# Patient Record
Sex: FEMALE | Race: WHITE | Employment: OTHER | URBAN - METROPOLITAN AREA
[De-identification: names, ages, dates, MRNs, and addresses within clinical notes are randomized per-mention and may not be internally consistent; named-entity substitution may affect disease eponyms.]

---

## 2019-06-28 ENCOUNTER — HOSPITAL ENCOUNTER (INPATIENT)
Age: 84
LOS: 11 days | Discharge: NURSING, REHAB OR LONG-TERM CARE | DRG: 330 | End: 2019-07-09
Attending: EMERGENCY MEDICINE | Admitting: INTERNAL MEDICINE
Payer: MEDICARE

## 2019-06-28 ENCOUNTER — APPOINTMENT (OUTPATIENT)
Dept: GENERAL RADIOLOGY | Age: 84
DRG: 330 | End: 2019-06-28
Attending: EMERGENCY MEDICINE
Payer: MEDICARE

## 2019-06-28 DIAGNOSIS — D62 ACUTE BLOOD LOSS ANEMIA: Primary | ICD-10-CM

## 2019-06-28 DIAGNOSIS — Z90.49 S/P RIGHT COLECTOMY: ICD-10-CM

## 2019-06-28 DIAGNOSIS — K92.2 GASTROINTESTINAL HEMORRHAGE, UNSPECIFIED GASTROINTESTINAL HEMORRHAGE TYPE: ICD-10-CM

## 2019-06-28 PROBLEM — D64.9 ANEMIA: Status: ACTIVE | Noted: 2019-06-28

## 2019-06-28 LAB
ALBUMIN SERPL-MCNC: 3.3 G/DL (ref 3.5–5)
ALBUMIN/GLOB SERPL: 0.8 {RATIO} (ref 1.1–2.2)
ALP SERPL-CCNC: 85 U/L (ref 45–117)
ALT SERPL-CCNC: 23 U/L (ref 12–78)
ANION GAP SERPL CALC-SCNC: 6 MMOL/L (ref 5–15)
APPEARANCE UR: CLEAR
AST SERPL-CCNC: 16 U/L (ref 15–37)
BACTERIA URNS QL MICRO: NEGATIVE /HPF
BASOPHILS # BLD: 0 K/UL (ref 0–0.1)
BASOPHILS NFR BLD: 1 % (ref 0–1)
BILIRUB SERPL-MCNC: 0.4 MG/DL (ref 0.2–1)
BILIRUB UR QL: NEGATIVE
BUN SERPL-MCNC: 12 MG/DL (ref 6–20)
BUN/CREAT SERPL: 13 (ref 12–20)
CALCIUM SERPL-MCNC: 9.2 MG/DL (ref 8.5–10.1)
CHLORIDE SERPL-SCNC: 107 MMOL/L (ref 97–108)
CO2 SERPL-SCNC: 27 MMOL/L (ref 21–32)
COLOR UR: ABNORMAL
COMMENT, HOLDF: NORMAL
CREAT SERPL-MCNC: 0.91 MG/DL (ref 0.55–1.02)
DIFFERENTIAL METHOD BLD: ABNORMAL
EOSINOPHIL # BLD: 0.1 K/UL (ref 0–0.4)
EOSINOPHIL NFR BLD: 2 % (ref 0–7)
EPITH CASTS URNS QL MICRO: ABNORMAL /LPF
ERYTHROCYTE [DISTWIDTH] IN BLOOD BY AUTOMATED COUNT: 15.9 % (ref 11.5–14.5)
GLOBULIN SER CALC-MCNC: 3.9 G/DL (ref 2–4)
GLUCOSE SERPL-MCNC: 92 MG/DL (ref 65–100)
GLUCOSE UR STRIP.AUTO-MCNC: NEGATIVE MG/DL
HCT VFR BLD AUTO: 25.9 % (ref 35–47)
HEMOCCULT STL QL: POSITIVE
HGB BLD-MCNC: 7.7 G/DL (ref 11.5–16)
HGB UR QL STRIP: NEGATIVE
HYALINE CASTS URNS QL MICRO: ABNORMAL /LPF (ref 0–5)
IMM GRANULOCYTES # BLD AUTO: 0 K/UL (ref 0–0.04)
IMM GRANULOCYTES NFR BLD AUTO: 0 % (ref 0–0.5)
KETONES UR QL STRIP.AUTO: NEGATIVE MG/DL
LEUKOCYTE ESTERASE UR QL STRIP.AUTO: ABNORMAL
LYMPHOCYTES # BLD: 2.6 K/UL (ref 0.8–3.5)
LYMPHOCYTES NFR BLD: 38 % (ref 12–49)
MCH RBC QN AUTO: 20.3 PG (ref 26–34)
MCHC RBC AUTO-ENTMCNC: 29.7 G/DL (ref 30–36.5)
MCV RBC AUTO: 68.3 FL (ref 80–99)
MONOCYTES # BLD: 0.8 K/UL (ref 0–1)
MONOCYTES NFR BLD: 12 % (ref 5–13)
NEUTS SEG # BLD: 3.3 K/UL (ref 1.8–8)
NEUTS SEG NFR BLD: 48 % (ref 32–75)
NITRITE UR QL STRIP.AUTO: NEGATIVE
NRBC # BLD: 0 K/UL (ref 0–0.01)
NRBC BLD-RTO: 0 PER 100 WBC
PH UR STRIP: 6.5 [PH] (ref 5–8)
PLATELET # BLD AUTO: 352 K/UL (ref 150–400)
PMV BLD AUTO: 9.5 FL (ref 8.9–12.9)
POTASSIUM SERPL-SCNC: 3.6 MMOL/L (ref 3.5–5.1)
PROT SERPL-MCNC: 7.2 G/DL (ref 6.4–8.2)
PROT UR STRIP-MCNC: NEGATIVE MG/DL
RBC # BLD AUTO: 3.79 M/UL (ref 3.8–5.2)
RBC #/AREA URNS HPF: ABNORMAL /HPF (ref 0–5)
SAMPLES BEING HELD,HOLD: NORMAL
SODIUM SERPL-SCNC: 140 MMOL/L (ref 136–145)
SP GR UR REFRACTOMETRY: 1.01 (ref 1–1.03)
TROPONIN I SERPL-MCNC: <0.05 NG/ML
TSH SERPL DL<=0.05 MIU/L-ACNC: 1.41 UIU/ML (ref 0.36–3.74)
UROBILINOGEN UR QL STRIP.AUTO: 0.2 EU/DL (ref 0.2–1)
WBC # BLD AUTO: 6.9 K/UL (ref 3.6–11)
WBC URNS QL MICRO: ABNORMAL /HPF (ref 0–4)

## 2019-06-28 PROCEDURE — 85025 COMPLETE CBC W/AUTO DIFF WBC: CPT

## 2019-06-28 PROCEDURE — 93005 ELECTROCARDIOGRAM TRACING: CPT

## 2019-06-28 PROCEDURE — 65660000000 HC RM CCU STEPDOWN

## 2019-06-28 PROCEDURE — 82272 OCCULT BLD FECES 1-3 TESTS: CPT

## 2019-06-28 PROCEDURE — 84484 ASSAY OF TROPONIN QUANT: CPT

## 2019-06-28 PROCEDURE — 71046 X-RAY EXAM CHEST 2 VIEWS: CPT

## 2019-06-28 PROCEDURE — 84443 ASSAY THYROID STIM HORMONE: CPT

## 2019-06-28 PROCEDURE — 99285 EMERGENCY DEPT VISIT HI MDM: CPT

## 2019-06-28 PROCEDURE — 83540 ASSAY OF IRON: CPT

## 2019-06-28 PROCEDURE — 36415 COLL VENOUS BLD VENIPUNCTURE: CPT

## 2019-06-28 PROCEDURE — 74011250636 HC RX REV CODE- 250/636: Performed by: INTERNAL MEDICINE

## 2019-06-28 PROCEDURE — 81001 URINALYSIS AUTO W/SCOPE: CPT

## 2019-06-28 PROCEDURE — C9113 INJ PANTOPRAZOLE SODIUM, VIA: HCPCS | Performed by: INTERNAL MEDICINE

## 2019-06-28 PROCEDURE — 80053 COMPREHEN METABOLIC PANEL: CPT

## 2019-06-28 RX ORDER — VALSARTAN AND HYDROCHLOROTHIAZIDE 160; 12.5 MG/1; MG/1
1 TABLET, FILM COATED ORAL DAILY
COMMUNITY
End: 2019-10-07 | Stop reason: SDUPTHER

## 2019-06-28 RX ORDER — DOCUSATE SODIUM 100 MG/1
100 CAPSULE, LIQUID FILLED ORAL
Status: DISCONTINUED | OUTPATIENT
Start: 2019-06-28 | End: 2019-07-03

## 2019-06-28 RX ORDER — NEBIVOLOL 5 MG/1
TABLET ORAL DAILY
Status: ON HOLD | COMMUNITY
End: 2019-07-09 | Stop reason: SDUPTHER

## 2019-06-28 RX ORDER — ACETAMINOPHEN 325 MG/1
650 TABLET ORAL
Status: DISCONTINUED | OUTPATIENT
Start: 2019-06-28 | End: 2019-07-03

## 2019-06-28 RX ORDER — VALSARTAN 160 MG/1
160 TABLET ORAL DAILY
COMMUNITY
End: 2019-07-09

## 2019-06-28 RX ORDER — SODIUM CHLORIDE 0.9 % (FLUSH) 0.9 %
5-40 SYRINGE (ML) INJECTION AS NEEDED
Status: DISCONTINUED | OUTPATIENT
Start: 2019-06-28 | End: 2019-07-02 | Stop reason: SDUPTHER

## 2019-06-28 RX ORDER — ONDANSETRON 2 MG/ML
4 INJECTION INTRAMUSCULAR; INTRAVENOUS
Status: DISCONTINUED | OUTPATIENT
Start: 2019-06-28 | End: 2019-07-09 | Stop reason: HOSPADM

## 2019-06-28 RX ORDER — NEBIVOLOL 2.5 MG/1
2.5 TABLET ORAL DAILY
Status: DISCONTINUED | OUTPATIENT
Start: 2019-06-29 | End: 2019-06-30

## 2019-06-28 RX ORDER — SODIUM CHLORIDE 0.9 % (FLUSH) 0.9 %
5-40 SYRINGE (ML) INJECTION EVERY 8 HOURS
Status: DISCONTINUED | OUTPATIENT
Start: 2019-06-28 | End: 2019-07-02 | Stop reason: SDUPTHER

## 2019-06-28 RX ORDER — HYDRALAZINE HYDROCHLORIDE 20 MG/ML
10 INJECTION INTRAMUSCULAR; INTRAVENOUS
Status: DISCONTINUED | OUTPATIENT
Start: 2019-06-28 | End: 2019-07-09 | Stop reason: HOSPADM

## 2019-06-28 RX ADMIN — Medication 10 ML: at 23:43

## 2019-06-28 RX ADMIN — IRON SUCROSE 200 MG: 20 INJECTION, SOLUTION INTRAVENOUS at 23:43

## 2019-06-28 RX ADMIN — PANTOPRAZOLE SODIUM 40 MG: 40 INJECTION, POWDER, FOR SOLUTION INTRAVENOUS at 23:54

## 2019-06-28 RX ADMIN — HYDRALAZINE HYDROCHLORIDE 10 MG: 20 INJECTION INTRAMUSCULAR; INTRAVENOUS at 23:55

## 2019-06-28 NOTE — ED PROVIDER NOTES
EMERGENCY DEPARTMENT HISTORY AND PHYSICAL EXAM      Date: 6/28/2019  Patient Name: Glenard Libman    Please note that this dictation was completed with [employer], the computer voice recognition software. Quite often unanticipated grammatical, syntax, homophones, and other interpretive errors are inadvertently transcribed by the computer software. Please disregard these errors. Please excuse any errors that have escaped final proofreading. History of Presenting Illness     Chief Complaint   Patient presents with    Fatigue     gen. weakness x 4 days       History Provided By: Patient    HPI: Isis Clark, 80 y.o. female, with a past medical history significant for hypertension presenting the emergency department complaining of general fatigue. Patient states \"I have malaise \". She states she has been feeling unwell for the past 3 days. Decreased appetite, fatigue, increased sleepiness. Denies any chest pain, abdominal pain, nausea vomiting or diarrhea. Denies any shortness of breath. Patient denies any other exacerbating, relieving factors or associated symptoms. PCP: None    No current facility-administered medications on file prior to encounter. Current Outpatient Medications on File Prior to Encounter   Medication Sig Dispense Refill    nebivolol (BYSTOLIC) 5 mg tablet daily.  valsartan (DIOVAN) 160 mg tablet Take 160 mg by mouth daily.  valsartan-hydroCHLOROthiazide (DIOVAN-HCT) 160-12.5 mg per tablet Take 1 Tab by mouth every other day. Past History     Past Medical History:  Past Medical History:   Diagnosis Date    Hypertension        Past Surgical History:  Past Surgical History:   Procedure Laterality Date    HX ORTHOPAEDIC      tia. hip replacement       Family History:  History reviewed. No pertinent family history.     Social History:  Social History     Tobacco Use    Smoking status: Never Smoker    Smokeless tobacco: Never Used   Substance Use Topics    Alcohol use: Never     Frequency: Never    Drug use: Never       Allergies: Allergies   Allergen Reactions    Diclofenac Other (comments)     Forgot reaction    Hydrocodone Other (comments)     Forgot reaction    Lisinopril Cough    Oxycodone Other (comments)     Forgot reaction    Promethazine Other (comments)     Pt. Forgot the reaction    Tramadol Other (comments)     Forgot reaction         Review of Systems   Review of Systems   Constitutional: Positive for fatigue. Negative for chills and fever. HENT: Negative for congestion and sore throat. Eyes: Negative for visual disturbance. Respiratory: Negative for cough and shortness of breath. Cardiovascular: Negative for chest pain and leg swelling. Gastrointestinal: Negative for abdominal pain, blood in stool, diarrhea and nausea. Endocrine: Negative for polyuria. Genitourinary: Negative for dysuria, flank pain, vaginal bleeding and vaginal discharge. Musculoskeletal: Negative for myalgias. Skin: Negative for rash. Allergic/Immunologic: Negative for immunocompromised state. Neurological: Positive for weakness. Negative for headaches. Psychiatric/Behavioral: Negative for confusion. Physical Exam   Physical Exam   Constitutional: oriented to person, place, and time. appears well-developed and well-nourished. HENT:   Head: Normocephalic and atraumatic. Moist mucous membranes   Eyes: Pupils are equal, round, and reactive to light. Conjunctivae are pale. Right eye exhibits no discharge. Left eye exhibits no discharge. Neck: Normal range of motion. Neck supple. No tracheal deviation present. Cardiovascular: Normal rate, regular rhythm and normal heart sounds. No murmur heard. Pulmonary/Chest: Effort normal and breath sounds normal. No respiratory distress. no wheezes. no rales. Abdominal: Soft. Bowel sounds are normal. There is no tenderness. There is no rebound and no guarding. : Dark brown, but not malonic stool.   No gross blood  Musculoskeletal: Normal range of motion. exhibits no edema, tenderness or deformity. Neurological: alert and oriented to person, place, and time. Skin: Skin is warm and dry. No rash noted. No erythema. Psychiatric: behavior is normal.   Nursing note and vitals reviewed. Diagnostic Study Results     Labs -   No results found for this or any previous visit (from the past 12 hour(s)). Radiologic Studies -   No orders to display     CT Results  (Last 48 hours)    None        CXR Results  (Last 48 hours)    None            Medical Decision Making   I am the first provider for this patient. I reviewed the vital signs, available nursing notes, past medical history, past surgical history, family history and social history. Vital Signs-Reviewed the patient's vital signs. Patient Vitals for the past 12 hrs:   Temp Pulse Resp BP SpO2   06/28/19 1835 98.5 °F (36.9 °C) (!) 57 18 186/56 97 %       Pulse Oximetry Analysis -100% on room air    Cardiac Monitor:   Normal sinus rhythm    EKG interpretation: (Preliminary)  Sinus rony, rate 58. Non specific anterior ST changes. Records Reviewed: Nursing Notes and Old Medical Records    Provider Notes (Medical Decision Making):   Patient here with general fatigue, malaise. Differential is broad including anemia, electrolyte disturbance, ACS, pneumonia, UTI, hypothyroidism. Disposition pending laboratory work-up and imaging. Patient found to have heme positive stool and anemia. Will consult gastroenterology. ED Course:   Initial assessment performed. The patients presenting problems have been discussed, and they are in agreement with the care plan formulated and outlined with them. I have encouraged them to ask questions as they arise throughout their visit. Discussed with GI on-call Dr. Callum Dickey, he will see and evaluate patient over the weekend. Agreed with iron studies and IV Venofer.     Critical Care Time: none    Disposition:  Patient will be admitted to the hospital by Dr. Erin Nolen or his colleague. Diagnosis     Clinical Impression:   1. Acute blood loss anemia    2. Gastrointestinal hemorrhage, unspecified gastrointestinal hemorrhage type        Attestations:   This note was completed by Maia Caballero DO

## 2019-06-29 LAB
ANION GAP SERPL CALC-SCNC: 6 MMOL/L (ref 5–15)
ATRIAL RATE: 58 BPM
BASOPHILS # BLD: 0.1 K/UL (ref 0–0.1)
BASOPHILS NFR BLD: 1 % (ref 0–1)
BUN SERPL-MCNC: 14 MG/DL (ref 6–20)
BUN/CREAT SERPL: 16 (ref 12–20)
CALCIUM SERPL-MCNC: 9 MG/DL (ref 8.5–10.1)
CALCULATED P AXIS, ECG09: 71 DEGREES
CALCULATED R AXIS, ECG10: 31 DEGREES
CALCULATED T AXIS, ECG11: 51 DEGREES
CHLORIDE SERPL-SCNC: 109 MMOL/L (ref 97–108)
CO2 SERPL-SCNC: 25 MMOL/L (ref 21–32)
CREAT SERPL-MCNC: 0.9 MG/DL (ref 0.55–1.02)
DIAGNOSIS, 93000: NORMAL
DIFFERENTIAL METHOD BLD: ABNORMAL
EOSINOPHIL # BLD: 0.1 K/UL (ref 0–0.4)
EOSINOPHIL NFR BLD: 1 % (ref 0–7)
ERYTHROCYTE [DISTWIDTH] IN BLOOD BY AUTOMATED COUNT: 15.9 % (ref 11.5–14.5)
GLUCOSE SERPL-MCNC: 99 MG/DL (ref 65–100)
HCT VFR BLD AUTO: 23.6 % (ref 35–47)
HGB BLD-MCNC: 7.2 G/DL (ref 11.5–16)
IMM GRANULOCYTES # BLD AUTO: 0 K/UL (ref 0–0.04)
IMM GRANULOCYTES NFR BLD AUTO: 0 % (ref 0–0.5)
IRON SATN MFR SERPL: 3 % (ref 20–50)
IRON SERPL-MCNC: 16 UG/DL (ref 35–150)
LYMPHOCYTES # BLD: 1.7 K/UL (ref 0.8–3.5)
LYMPHOCYTES NFR BLD: 27 % (ref 12–49)
MCH RBC QN AUTO: 20.7 PG (ref 26–34)
MCHC RBC AUTO-ENTMCNC: 30.5 G/DL (ref 30–36.5)
MCV RBC AUTO: 67.8 FL (ref 80–99)
MONOCYTES # BLD: 0.4 K/UL (ref 0–1)
MONOCYTES NFR BLD: 7 % (ref 5–13)
NEUTS SEG # BLD: 4.1 K/UL (ref 1.8–8)
NEUTS SEG NFR BLD: 64 % (ref 32–75)
NRBC # BLD: 0 K/UL (ref 0–0.01)
NRBC BLD-RTO: 0 PER 100 WBC
P-R INTERVAL, ECG05: 174 MS
PLATELET # BLD AUTO: 361 K/UL (ref 150–400)
PMV BLD AUTO: 9.8 FL (ref 8.9–12.9)
POTASSIUM SERPL-SCNC: 3.5 MMOL/L (ref 3.5–5.1)
Q-T INTERVAL, ECG07: 408 MS
QRS DURATION, ECG06: 76 MS
QTC CALCULATION (BEZET), ECG08: 400 MS
RBC # BLD AUTO: 3.48 M/UL (ref 3.8–5.2)
RBC MORPH BLD: ABNORMAL
SODIUM SERPL-SCNC: 140 MMOL/L (ref 136–145)
TIBC SERPL-MCNC: 495 UG/DL (ref 250–450)
VENTRICULAR RATE, ECG03: 58 BPM
WBC # BLD AUTO: 6.4 K/UL (ref 3.6–11)

## 2019-06-29 PROCEDURE — C9113 INJ PANTOPRAZOLE SODIUM, VIA: HCPCS | Performed by: INTERNAL MEDICINE

## 2019-06-29 PROCEDURE — 94760 N-INVAS EAR/PLS OXIMETRY 1: CPT

## 2019-06-29 PROCEDURE — 74011000250 HC RX REV CODE- 250: Performed by: INTERNAL MEDICINE

## 2019-06-29 PROCEDURE — 74011250636 HC RX REV CODE- 250/636: Performed by: INTERNAL MEDICINE

## 2019-06-29 PROCEDURE — 85025 COMPLETE CBC W/AUTO DIFF WBC: CPT

## 2019-06-29 PROCEDURE — 65660000000 HC RM CCU STEPDOWN

## 2019-06-29 PROCEDURE — 36415 COLL VENOUS BLD VENIPUNCTURE: CPT

## 2019-06-29 PROCEDURE — 80048 BASIC METABOLIC PNL TOTAL CA: CPT

## 2019-06-29 RX ORDER — POLYETHYLENE GLYCOL 3350, SODIUM SULFATE ANHYDROUS, SODIUM BICARBONATE, SODIUM CHLORIDE, POTASSIUM CHLORIDE 236; 22.74; 6.74; 5.86; 2.97 G/4L; G/4L; G/4L; G/4L; G/4L
4000 POWDER, FOR SOLUTION ORAL ONCE
Status: COMPLETED | OUTPATIENT
Start: 2019-06-30 | End: 2019-06-30

## 2019-06-29 RX ADMIN — Medication 10 ML: at 14:00

## 2019-06-29 RX ADMIN — Medication 10 ML: at 21:00

## 2019-06-29 RX ADMIN — SODIUM CHLORIDE 40 MG: 9 INJECTION, SOLUTION INTRAMUSCULAR; INTRAVENOUS; SUBCUTANEOUS at 20:52

## 2019-06-29 RX ADMIN — SODIUM CHLORIDE 40 MG: 9 INJECTION, SOLUTION INTRAMUSCULAR; INTRAVENOUS; SUBCUTANEOUS at 13:10

## 2019-06-29 RX ADMIN — Medication 10 ML: at 06:55

## 2019-06-29 NOTE — PROGRESS NOTES
Bedside shift change report given to Capri Guerrier RN (oncoming nurse) by Jaimee Munson RN   (offgoing nurse). Report included the following information SBAR, Kardex, Intake/Output, MAR and Recent Results.

## 2019-06-29 NOTE — PROGRESS NOTES
TRANSFER - IN REPORT:    Verbal report received from [address] (name) on [address]  being received from ED (unit) for routine progression of care      Report consisted of patients Situation, Background, Assessment and   Recommendations(SBAR). Information from the following report(s) SBAR, Kardex, Procedure Summary, Intake/Output, MAR and Recent Results was reviewed with the receiving nurse. Opportunity for questions and clarification was provided. Assessment completed upon patients arrival to unit and care assumed.

## 2019-06-29 NOTE — PROGRESS NOTES
Bedside shift change report given to [facility] (oncoming nurse) by Maximo Lynn (offgoing nurse). Report included the following information SBAR, Kardex, Procedure Summary, Intake/Output, MAR and Recent Results.

## 2019-06-29 NOTE — H&P
Hospitalist Admission Note    NAME: Isis White   :  1932   MRN:  [id number]     Date/Time:  2019 10:57 PM    Patient PCP: None  ________________________________________________________________________    My assessment of this patient's clinical condition and my plan of care is as follows. Assessment / Plan:  Acute blood loss anemia symptomatic  Suspected GI bleed  -On rectal exam she had dark brown stool and fecal occult blood was positive  -She did have episode of low blood pressure at home but currently blood pressure is improved  -Hemoglobin is currently 7.7 and baseline is unknown  -Start Protonix 40 mg IV twice daily, keep n.p.o. from midnight  -Holding off on IV fluids for now as blood pressure is on the higher side  -Insert 2 large-bore IV lines at all times  -GI has been consulted and the recommended benefit and keeping the patient n.p.o. from midnight  -Iron profile has been ordered.  Check CBC in a.m.     Hypertensive urgency  -Patient reports her blood pressure was on the lower side at home and currently blood pressure is 180 x 70  -Resume her home valsartan and hydrochlorothiazide  -Start hydralazine as needed for hypertensive urgency    Sinus bradycardia likely related to nebivolol  -Continue nebivolol with holding parameters  -TSH is normal        Code Status: full  Surrogate Decision Maker: Dtr [id number]    DVT Prophylaxis: Scd's  GI Prophylaxis: not indicated    Baseline: From home independent        Subjective:   CHIEF COMPLAINT: Gen weakness    HISTORY OF PRESENT ILLNESS:     This is a 26-year-old female with past medical history of hypertension is coming to the hospital with chief complaints of generalized weakness, fatigue since the last week.  Patient reports being in her usual state of health until about a week ago when she started not feeling well.  She reports the symptoms really got worse in the last 4 days.  She reports weakness is generalized involving both the arms and legs.  She does not report any abdominal pain, diarrhea, constipation, nausea or vomiting.  She does report mild exertional shortness of breath but no chest pain or palpitations.  Does not report any dysuria or urgency. On arrival to the hospital she was noted to have slight bradycardia, blood pressure was stable.  On lab work she was noted to have a hemoglobin of 7.7 BMP was within normal limits.  Facet of troponin was 0.05.  She had an x-ray which showed no evidence of acute process.  EKG showed sinus bradycardia.  He had a rectal exam in the emergency department which showed evidence of positive occult blood test.    We were asked to admit for work up and evaluation of the above problems. Past Medical History:   Diagnosis Date    Hypertension         Past Surgical History:   Procedure Laterality Date    HX ORTHOPAEDIC      tia. hip replacement       Social History     Tobacco Use    Smoking status: Never Smoker    Smokeless tobacco: Never Used   Substance Use Topics    Alcohol use: Never     Frequency: Never        Family History  No cad in the family    Allergies   Allergen Reactions    Diclofenac Other (comments)     Forgot reaction    Hydrocodone Other (comments)     Forgot reaction    Lisinopril Cough    Oxycodone Other (comments)     Forgot reaction    Promethazine Other (comments)     Pt. Forgot the reaction    Tramadol Other (comments)     Forgot reaction        Prior to Admission medications    Medication Sig Start Date End Date Taking? Authorizing Provider   nebivolol (BYSTOLIC) 5 mg tablet daily. Yes Fabio, MD Henrique   valsartan (DIOVAN) 160 mg tablet Take 160 mg by mouth daily. Yes Fabio, MD Henrique   valsartan-hydroCHLOROthiazide (DIOVAN-HCT) 160-12.5 mg per tablet Take 1 Tab by mouth every other day. Yes Fabio, MD Henrique       REVIEW OF SYSTEMS:     I am not able to complete the review of systems because:    The patient is intubated and sedated    The patient has altered mental status due to his acute medical problems    The patient has baseline aphasia from prior stroke(s)    The patient has baseline dementia and is not reliable historian    The patient is in acute medical distress and unable to provide information           Total of 12 systems reviewed as follows:       POSITIVE= underlined text  Negative = text not underlined  General:  fever, chills, sweats, generalized weakness, weight loss/gain,      loss of appetite   Eyes:    blurred vision, eye pain, loss of vision, double vision  ENT:    rhinorrhea, pharyngitis   Respiratory:   cough, sputum production, SOB, MCPHERSON, wheezing, pleuritic pain   Cardiology:   chest pain, palpitations, orthopnea, PND, edema, syncope   Gastrointestinal:  abdominal pain , N/V, diarrhea, dysphagia, constipation, bleeding   Genitourinary:  frequency, urgency, dysuria, hematuria, incontinence   Muskuloskeletal :  arthralgia, myalgia, back pain  Hematology:  easy bruising, nose or gum bleeding, lymphadenopathy   Dermatological: rash, ulceration, pruritis, color change / jaundice  Endocrine:   hot flashes or polydipsia   Neurological:  headache, dizziness, confusion, focal weakness, paresthesia,     Speech difficulties, memory loss, gait difficulty  Psychological: Feelings of anxiety, depression, agitation    Objective:   VITALS:    Visit Vitals  /52   Pulse (!) 56   Temp 98.5 °F (36.9 °C)   Resp 18   Ht 5' 4\" (1.626 m)   Wt 74.8 kg (165 lb)   SpO2 100%   BMI 28.32 kg/m²       PHYSICAL EXAM:    General:    Alert, cooperative, no distress, appears stated age. HEENT: Atraumatic, anicteric sclerae, pink conjunctivae     No oral ulcers, mucosa moist, throat clear, dentition fair  Neck:  Supple, symmetrical,  thyroid: non tender  Lungs:   Clear to auscultation bilaterally. No Wheezing or Rhonchi. No rales. Chest wall:  No tenderness  No Accessory muscle use. Heart:   Regular  rhythm,  No  murmur   No edema  Abdomen:   Soft, non-tender.  Not distended. Bowel sounds normal  Extremities: No cyanosis. No clubbing,      Skin turgor normal, Capillary refill normal, Radial dial pulse 2+  Skin:     Not pale. Not Jaundiced  No rashes   Psych:  Good insight. Not depressed. Not anxious or agitated. Neurologic: EOMs intact. No facial asymmetry. No aphasia or slurred speech. Symmetrical strength, Sensation grossly intact. Alert and oriented X 4.     _______________________________________________________________________  Care Plan discussed with:    Comments   Patient y    Family      RN y    Care Manager                    Consultant:      _______________________________________________________________________  Expected  Disposition:   Home with Family y   HH/PT/OT/RN    SNF/LTC    [facility]    ________________________________________________________________________  TOTAL TIME:  61  Minutes    Critical Care Provided     Minutes non procedure based      Comments    y Reviewed previous records   >50% of visit spent in counseling and coordination of care y Discussion with patient and/or family and questions answered       ________________________________________________________________________  Signed: Rico Tucker MD    Procedures: see electronic medical records for all procedures/Xrays and details which were not copied into this note but were reviewed prior to creation of Plan. LAB DATA REVIEWED:    Recent Results (from the past 24 hour(s))   SAMPLES BEING HELD    Collection Time: 06/28/19  6:39 PM   Result Value Ref Range    SAMPLES BEING HELD 1GREEN TOP     COMMENT        Add-on orders for these samples will be processed based on acceptable specimen integrity and analyte stability, which may vary by analyte.    CBC WITH AUTOMATED DIFF    Collection Time: 06/28/19  7:16 PM   Result Value Ref Range    WBC 6.9 3.6 - 11.0 K/uL    RBC 3.79 (L) 3.80 - 5.20 M/uL    HGB 7.7 (L) 11.5 - 16.0 g/dL    HCT 25.9 (L) 35.0 - 47.0 %    MCV 68.3 (L) 80.0 - 99.0 FL    MCH 20.3 (L) 26.0 - 34.0 PG    MCHC 29.7 (L) 30.0 - 36.5 g/dL    RDW 15.9 (H) 11.5 - 14.5 %    PLATELET 536 291 - 418 K/uL    MPV 9.5 8.9 - 12.9 FL    NRBC 0.0 0  WBC    ABSOLUTE NRBC 0.00 0.00 - 0.01 K/uL    NEUTROPHILS 48 32 - 75 %    LYMPHOCYTES 38 12 - 49 %    MONOCYTES 12 5 - 13 %    EOSINOPHILS 2 0 - 7 %    BASOPHILS 1 0 - 1 %    IMMATURE GRANULOCYTES 0 0.0 - 0.5 %    ABS. NEUTROPHILS 3.3 1.8 - 8.0 K/UL    ABS. LYMPHOCYTES 2.6 0.8 - 3.5 K/UL    ABS. MONOCYTES 0.8 0.0 - 1.0 K/UL    ABS. EOSINOPHILS 0.1 0.0 - 0.4 K/UL    ABS. BASOPHILS 0.0 0.0 - 0.1 K/UL    ABS. IMM. GRANS. 0.0 0.00 - 0.04 K/UL    DF AUTOMATED     METABOLIC PANEL, COMPREHENSIVE    Collection Time: 06/28/19  7:16 PM   Result Value Ref Range    Sodium 140 136 - 145 mmol/L    Potassium 3.6 3.5 - 5.1 mmol/L    Chloride 107 97 - 108 mmol/L    CO2 27 21 - 32 mmol/L    Anion gap 6 5 - 15 mmol/L    Glucose 92 65 - 100 mg/dL    BUN 12 6 - 20 MG/DL    Creatinine 0.91 0.55 - 1.02 MG/DL    BUN/Creatinine ratio 13 12 - 20      GFR est AA >60 >60 ml/min/1.73m2    GFR est non-AA 58 (L) >60 ml/min/1.73m2    Calcium 9.2 8.5 - 10.1 MG/DL    Bilirubin, total 0.4 0.2 - 1.0 MG/DL    ALT (SGPT) 23 12 - 78 U/L    AST (SGOT) 16 15 - 37 U/L    Alk.  phosphatase 85 45 - 117 U/L    Protein, total 7.2 6.4 - 8.2 g/dL    Albumin 3.3 (L) 3.5 - 5.0 g/dL    Globulin 3.9 2.0 - 4.0 g/dL    A-G Ratio 0.8 (L) 1.1 - 2.2     TROPONIN I    Collection Time: 06/28/19  7:16 PM   Result Value Ref Range    Troponin-I, Qt. <0.05 <0.05 ng/mL   TSH 3RD GENERATION    Collection Time: 06/28/19  7:16 PM   Result Value Ref Range    TSH 1.41 0.36 - 3.74 uIU/mL   URINALYSIS W/ RFLX MICROSCOPIC    Collection Time: 06/28/19  7:35 PM   Result Value Ref Range    Color YELLOW/STRAW      Appearance CLEAR CLEAR      Specific gravity 1.010 1.003 - 1.030      pH (UA) 6.5 5.0 - 8.0      Protein NEGATIVE  NEG mg/dL    Glucose NEGATIVE  NEG mg/dL    Ketone NEGATIVE  NEG mg/dL    Bilirubin NEGATIVE  NEG Blood NEGATIVE  NEG      Urobilinogen 0.2 0.2 - 1.0 EU/dL    Nitrites NEGATIVE  NEG      Leukocyte Esterase MODERATE (A) NEG      WBC 10-20 0 - 4 /hpf    RBC 0-5 0 - 5 /hpf    Epithelial cells FEW FEW /lpf    Bacteria NEGATIVE  NEG /hpf    Hyaline cast 0-2 0 - 5 /lpf   EKG, 12 LEAD, INITIAL    Collection Time: 06/28/19  7:46 PM   Result Value Ref Range    Ventricular Rate 58 BPM    Atrial Rate 58 BPM    P-R Interval 174 ms    QRS Duration 76 ms    Q-T Interval 408 ms    QTC Calculation (Bezet) 400 ms    Calculated P Axis 71 degrees    Calculated R Axis 31 degrees    Calculated T Axis 51 degrees    Diagnosis       Sinus bradycardia  Low voltage QRS  Nonspecific ST abnormality  No previous ECGs available     OCCULT BLOOD, STOOL    Collection Time: 06/28/19  9:15 PM   Result Value Ref Range    Occult blood, stool POSITIVE (A) NEG

## 2019-06-29 NOTE — PROGRESS NOTES
Hospitalist Progress Note    NAME: Isis Butler   :  1932   MRN:  [id number]       Assessment / Plan:  Acute Blood Loss Anemia, Hgb 7.7 on admission, 7.2 today; Patient denies history of anemia  +FOBT and dark stool at home  Iron deficiency  -Follow Hgb and Hct, send type and cross with am labs  -Family to bring in lab results from patient's last yearly physical to compare Hgb trends  -Await GI consult, patient has never had a colonoscopy  -get orthostatics and walking HR since Hgb borderline and patient complains of MCPHERSON  -received IV ion this am  -continue IV PPI  -I have ordered diet for patient  -risks and benefits of bleed transfusion discussed with patient today     Hypertension with Hypertensive Urgency on presentation   Sinus Bradycardia  -Continue Bystolic for now, patient lightheaded even with low normal BP on standing thus will be carotid dopplers     25.0 - 29.9 Overweight / Body mass index is 28.32 kg/m².     Code status: DNR  Prophylaxis: SCD's  Recommended Disposition: Home w/Family     Subjective:     Chief Complaint / Reason for Physician Visit: follow-up weakness, anemia  Patient seen for follow-up  Has never been told she is anemia  Describes dark stool at home    Review of Systems:  Symptom Y/N Comments  Symptom Y/N Comments   Fever/Chills    Chest Pain     Poor Appetite    Edema     Cough    Abdominal Pain     Sputum    Joint Pain     SOB/MCPHERSON    Pruritis/Rash     Nausea/vomit    Tolerating PT/OT     Diarrhea    Tolerating Diet     Constipation    Other       Could NOT obtain due to:      Objective:     VITALS:   Last 24hrs VS reviewed since prior progress note.  Most recent are:  Patient Vitals for the past 24 hrs:   Temp Pulse Resp BP SpO2   19 0732 97.8 °F (36.6 °C) 74 16 140/64 99 %   19 0419 97.4 °F (36.3 °C) 63 18 149/49 100 %   19 2338 97.9 °F (36.6 °C) 61 16 190/59 100 %   19 2308  65 18 178/62 96 %   19 2145  (!) 56 18 152/52 100 %   19 2030  60 18 171/50 96 %   06/28/19 1930  (!) 54 18 158/58 98 %   06/28/19 1835 98.5 °F (36.9 °C) (!) 57 18 186/56 97 %     No intake or output data in the 24 hours ending 06/29/19 0944     PHYSICAL EXAM:  General: WD, WN. Alert, cooperative, no acute distress    EENT:  EOMI. Anicteric sclerae. MM   Resp:  CTA bilaterally, no wheezing or rales. No accessory muscle use  CV:  Regular  rhythm,  No edema  GI:  Soft, Non distended, Non tender.  +Bowel sounds  Neurologic:  Alert and oriented X 3, normal speech,   Psych:   Good insight. Not anxious nor agitated  Skin:  No rashes, pale. No jaundice    Reviewed most current lab test results and cultures  YES  Reviewed most current radiology test results   YES  Review and summation of old records today    NO  Reviewed patient's current orders and MAR    YES  PMH/ reviewed - no change compared to H&P  ________________________________________________________________________  Care Plan discussed with:    Comments   Patient x    Family  x    RN x    Care Manager     Consultant                        Multidiciplinary team rounds were held today with , nursing, pharmacist and clinical coordinator. Patient's plan of care was discussed; medications were reviewed and discharge planning was addressed. ________________________________________________________________________  Total NON critical care TIME: 35   Minutes    Total CRITICAL CARE TIME Spent:   Minutes non procedure based      Comments   >50% of visit spent in counseling and coordination of care x    ________________________________________________________________________  Brenda Santizo MD     Procedures: see electronic medical records for all procedures/Xrays and details which were not copied into this note but were reviewed prior to creation of Plan. LABS:  I reviewed today's most current labs and imaging studies.   Pertinent labs include:  Recent Labs     06/29/19  0420 06/28/19  1916   WBC 6.4 6.9 HGB 7.2* 7.7*   HCT 23.6* 25.9*    352     Recent Labs     06/29/19  0420 06/28/19  1916    140   K 3.5 3.6   * 107   CO2 25 27   GLU 99 92   BUN 14 12   CREA 0.90 0.91   CA 9.0 9.2   ALB  --  3.3*   TBILI  --  0.4   SGOT  --  16   ALT  --  23       Signed: Gelacio Carlos MD

## 2019-06-29 NOTE — PROGRESS NOTES
Primary Nurse Irlanda Ng RN and Praveen Almonte RN performed a dual skin assessment on this patient No impairment noted  Jt score is 22    *Bilateral hip scars from replacements. ABD scar from appendectomy.

## 2019-06-29 NOTE — PROGRESS NOTES
Pt states she has feeling of syncope, states she has history of fainting in the past but last being 2 years ago. Feeling came during laying and standing while obtaining orthostatics. MD notified.

## 2019-06-30 LAB
ANION GAP SERPL CALC-SCNC: 6 MMOL/L (ref 5–15)
BASOPHILS # BLD: 0 K/UL (ref 0–0.1)
BASOPHILS NFR BLD: 0 % (ref 0–1)
BUN SERPL-MCNC: 12 MG/DL (ref 6–20)
BUN/CREAT SERPL: 12 (ref 12–20)
CALCIUM SERPL-MCNC: 8.7 MG/DL (ref 8.5–10.1)
CHLORIDE SERPL-SCNC: 110 MMOL/L (ref 97–108)
CO2 SERPL-SCNC: 25 MMOL/L (ref 21–32)
CREAT SERPL-MCNC: 0.99 MG/DL (ref 0.55–1.02)
DIFFERENTIAL METHOD BLD: ABNORMAL
EOSINOPHIL # BLD: 0.1 K/UL (ref 0–0.4)
EOSINOPHIL NFR BLD: 2 % (ref 0–7)
ERYTHROCYTE [DISTWIDTH] IN BLOOD BY AUTOMATED COUNT: 16.2 % (ref 11.5–14.5)
GLUCOSE SERPL-MCNC: 94 MG/DL (ref 65–100)
HCT VFR BLD AUTO: 23.8 % (ref 35–47)
HGB BLD-MCNC: 7 G/DL (ref 11.5–16)
IMM GRANULOCYTES # BLD AUTO: 0 K/UL (ref 0–0.04)
IMM GRANULOCYTES NFR BLD AUTO: 0 % (ref 0–0.5)
LYMPHOCYTES # BLD: 2.7 K/UL (ref 0.8–3.5)
LYMPHOCYTES NFR BLD: 39 % (ref 12–49)
MCH RBC QN AUTO: 20 PG (ref 26–34)
MCHC RBC AUTO-ENTMCNC: 29.4 G/DL (ref 30–36.5)
MCV RBC AUTO: 68 FL (ref 80–99)
MONOCYTES # BLD: 0.9 K/UL (ref 0–1)
MONOCYTES NFR BLD: 12 % (ref 5–13)
NEUTS SEG # BLD: 3.2 K/UL (ref 1.8–8)
NEUTS SEG NFR BLD: 46 % (ref 32–75)
NRBC # BLD: 0.02 K/UL (ref 0–0.01)
NRBC BLD-RTO: 0.3 PER 100 WBC
PLATELET # BLD AUTO: 338 K/UL (ref 150–400)
PMV BLD AUTO: 10.2 FL (ref 8.9–12.9)
POTASSIUM SERPL-SCNC: 3.6 MMOL/L (ref 3.5–5.1)
RBC # BLD AUTO: 3.5 M/UL (ref 3.8–5.2)
SODIUM SERPL-SCNC: 141 MMOL/L (ref 136–145)
WBC # BLD AUTO: 6.9 K/UL (ref 3.6–11)

## 2019-06-30 PROCEDURE — 94760 N-INVAS EAR/PLS OXIMETRY 1: CPT

## 2019-06-30 PROCEDURE — 65660000000 HC RM CCU STEPDOWN

## 2019-06-30 PROCEDURE — C9113 INJ PANTOPRAZOLE SODIUM, VIA: HCPCS | Performed by: INTERNAL MEDICINE

## 2019-06-30 PROCEDURE — 74011000250 HC RX REV CODE- 250: Performed by: SPECIALIST

## 2019-06-30 PROCEDURE — 85025 COMPLETE CBC W/AUTO DIFF WBC: CPT

## 2019-06-30 PROCEDURE — 86923 COMPATIBILITY TEST ELECTRIC: CPT

## 2019-06-30 PROCEDURE — 74011000250 HC RX REV CODE- 250: Performed by: INTERNAL MEDICINE

## 2019-06-30 PROCEDURE — 86900 BLOOD TYPING SEROLOGIC ABO: CPT

## 2019-06-30 PROCEDURE — 36430 TRANSFUSION BLD/BLD COMPNT: CPT

## 2019-06-30 PROCEDURE — 36415 COLL VENOUS BLD VENIPUNCTURE: CPT

## 2019-06-30 PROCEDURE — 80048 BASIC METABOLIC PNL TOTAL CA: CPT

## 2019-06-30 PROCEDURE — P9016 RBC LEUKOCYTES REDUCED: HCPCS

## 2019-06-30 PROCEDURE — 74011250636 HC RX REV CODE- 250/636: Performed by: INTERNAL MEDICINE

## 2019-06-30 PROCEDURE — 30233N1 TRANSFUSION OF NONAUTOLOGOUS RED BLOOD CELLS INTO PERIPHERAL VEIN, PERCUTANEOUS APPROACH: ICD-10-PCS | Performed by: INTERNAL MEDICINE

## 2019-06-30 RX ORDER — SODIUM CHLORIDE 9 MG/ML
250 INJECTION, SOLUTION INTRAVENOUS AS NEEDED
Status: DISCONTINUED | OUTPATIENT
Start: 2019-06-30 | End: 2019-07-03

## 2019-06-30 RX ADMIN — POLYETHYLENE GLYCOL-3350 AND ELECTROLYTES 4000 ML: 236; 6.74; 5.86; 2.97; 22.74 POWDER, FOR SOLUTION ORAL at 15:06

## 2019-06-30 RX ADMIN — Medication 10 ML: at 10:02

## 2019-06-30 RX ADMIN — SODIUM CHLORIDE 40 MG: 9 INJECTION, SOLUTION INTRAMUSCULAR; INTRAVENOUS; SUBCUTANEOUS at 10:01

## 2019-06-30 RX ADMIN — Medication 10 ML: at 22:45

## 2019-06-30 RX ADMIN — SODIUM CHLORIDE 40 MG: 9 INJECTION, SOLUTION INTRAMUSCULAR; INTRAVENOUS; SUBCUTANEOUS at 22:44

## 2019-06-30 NOTE — PROGRESS NOTES
Bedside and Verbal shift change report given to Kusum Bailon RN (oncoming nurse) by PETROS Gale (offgoing nurse). Report included the following information SBAR, Kardex, MAR, Recent Results and Cardiac Rhythm NSR. Bedside and Verbal shift change report given to PETROS Gale (oncoming nurse) by Kusum Bailon RN (offgoing nurse). Report included the following information SBAR, Kardex, MAR, Recent Results and Cardiac Rhythm NSR.

## 2019-06-30 NOTE — PROGRESS NOTES
No nausea. Voided twice. Slept well. Plan for clear liq and go lyutely today. 3 attempts for a 20 ga IV site unsuccessful. Left antecubital site patent, she doesn't like the location, will leave in till new site available. Hgb 7.2 to 7.0 this am. Type and screen sent. BP up to 150's over 50's. No feeling of faintness tonight.

## 2019-06-30 NOTE — CONSULTS
[facility]    Name:  Tiffany Murrieta  MR#:  [id number]  :  1932  ACCOUNT #:  [de-identified]  DATE OF SERVICE:  2019      CHIEF COMPLAINT:  Generalized weakness, severe iron-deficiency anemia. HISTORY OF PRESENT ILLNESS:  An 29-year-old woman who is taking no NSAIDs other than aspirin 81 mg a day and over the last week has been noting increased fatigue and weakness. She denied any chest pain. When brought to the emergency room, she was found to have a hemoglobin of 7.7 g, which was microcytic, and iron panel was found to represent iron deficiency. The patient reports being in usual state of health until a week ago when she started feeling poorly. She noted that the symptoms worsened four days prior to admission. She denied any abdominal pain, diarrhea, constipation, nausea, or vomiting. She denied any melena or hematochezia. She did note some dyspnea on exertion. There are no palpitations. In the emergency room, she was found to be tachycardic with hemoglobin 7.7, normal BMP, normal troponin. Chest x-ray was normal.  EKG showed a bradycardia. Rectal exam demonstrated brown stool that was heme positive. She emphatically denies any other NSAID use other than simply aspirin. The patient never had an EGD or colonoscopy. No history of GI bleeding in the past.  Only surgery has been that of appendectomy many years ago. PAST MEDICAL HISTORY:  Positive only for hypertension. MEDICATIONS PRIOR TO ADMISSION:  Include Bystolic, Diovan, hydrochlorothiazide, and 81 mg of aspirin. ALLERGIES:  DICLOFENAC, HYDROCODONE, LISINOPRIL, OXYCODONE, PROMETHAZINE, TRAMADOL. SURGICAL HISTORY:  Bilateral hip replacement. SOCIAL HISTORY:  She is a nonsmoker, nondrinker. FAMILY HISTORY:  There is no family history of coronary artery disease. No history of colon cancer, colon polyps, or pancreatic cancer.     REVIEW OF SYSTEMS:  Complete 10-system review is as noted above. Otherwise, it is negative. PHYSICAL EXAMINATION:  GENERAL:  Alert, oriented elderly woman in no acute distress. Oriented x3. Pale in appearance. VITAL SIGNS:  Temperature is 98.1, pulse is 70, blood pressure is 139/60, respirations are 18. SKIN:  Without rashes or petechiae. HEAD, EYES, EARS, NOSE, AND THROAT:  Sclerae are anicteric. Conjunctivae are pale. Moist mucous membranes. NECK:  Supple. LYMPHATIC:  There is no adenopathy in neck or groin. CHEST:  Clear to auscultation and percussion. HEART:  Reveals regular rate and rhythm. ABDOMEN:  Soft without hepatosplenomegaly. Nontender. EXTREMITIES:  Without cyanosis, clubbing, or edema. LABORATORY DATA:  WBC is 6.7, hemoglobin is 7.2, MCV is 67, platelet count is 977. Urinalysis:  Moderate leukocyte esterase, wbc is 10-20. BUN/creatinine ratio is normal.  Albumin is 3.3. Iron percent saturation is 3. TSH is normal.  Stools are occult positive. Chest x-ray:  No acute changes. EKG:  Sinus rony, low voltage QRS, nonspecific ST-T wave abnormalities. IMPRESSION AND PLAN:  1. Gastrointestinal blood loss anemia suspicious for lower gastrointestinal source, in particular neoplasia. Certainly, agree with use of Protonix as with her aspirin usage, even low dose could have an upper gastrointestinal source. The lack of elevated BUN/creatinine ratio makes me feel this is lower. Transfuse as you feel indicated. 2.  Hypertension. 3.  Sinus bradycardia, being followed. Recommend esophagogastroduodenoscopy and colonoscopy for further evaluation. We will start her on clear liquids tomorrow. Transfuse as you feel indicated.       MD LUCIUS Low/[id number]/  D:  06/29/2019 15:48  T:  06/29/2019 19:38  JOB #:  [id number]  CC:    Sara Salas MD

## 2019-06-30 NOTE — PROGRESS NOTES
Hospitalist Progress Note    NAME: Isis Marte   :  1932   MRN:  [id number]       Assessment / Plan:  Acute Blood Loss Anemia, baseline Hgb in  10-11 range per review of outside records, Patient's Hgb today is 7.0, patient with symptoms of anemia and case discussed with Dr. Dedra Brown today and endoscopy will be safer if unit of blood given  +FOBT and dark stool at home  Iron deficiency  -will transfuse 1 unit pRBCs today  -Endoscopy planned for tomorrow per GI  -continue IV PPI     Hypertension with Hypertensive Urgency on presentation   Sinus Bradycardia: resolved  -Holding Bystolic for now (held yesterday with BP being lower limit of normal)    -Patient clarifies today that on standing she has weakess and feels faint and denies that this is lightheadedness or dizziness thus carotid dopplers discontinued     25.0 - 29.9 Overweight / Body mass index is 28.32 kg/m².     Code status: DNR  Prophylaxis: SCD's  Recommended Disposition: Home w/Family     Subjective:     Chief Complaint / Reason for Physician Visit: follow-up weakness, anemia  Patient seen for follow-up  Still faint with standing, going to restroom  Has a feeling of nausea    Review of Systems:  Symptom Y/N Comments  Symptom Y/N Comments   Fever/Chills    Chest Pain n    Poor Appetite    Edema     Cough    Abdominal Pain n    Sputum    Joint Pain     SOB/MCPHERSON y MCPHERSON  Pruritis/Rash     Nausea/vomit  No vomiting  Tolerating PT/OT     Diarrhea    Tolerating Diet     Constipation    Other       Could NOT obtain due to:      Objective:     VITALS:   Last 24hrs VS reviewed since prior progress note.  Most recent are:  Patient Vitals for the past 24 hrs:   Temp Pulse Resp BP SpO2   19 0758 97.9 °F (36.6 °C) 64 19 153/61 96 %   19 0300 97.3 °F (36.3 °C) 66 20 151/59 98 %   19 2346 97.4 °F (36.3 °C) 71 18 (!) 147/39 97 %   19 1953 97.4 °F (36.3 °C) 66 18 141/42 96 %   19 1504 98.1 °F (36.7 °C) 70 18 139/60 98 %   19 1308  70  118/40    06/29/19 1307  74  104/50    06/29/19 1306  63  134/51    06/29/19 1146 97.3 °F (36.3 °C) 66 18 142/51 96 %       Intake/Output Summary (Last 24 hours) at 6/30/2019 0940  Last data filed at 6/30/2019 0624  Gross per 24 hour   Intake 1350 ml   Output 600 ml   Net 750 ml        PHYSICAL EXAM:  General: WD, WN. Alert, cooperative, no acute distress    EENT:  EOMI. Anicteric sclerae. MMM; no carotid bruit  Resp:  CTA bilaterally, no wheezing or rales. No accessory muscle use  CV:  Regular  rhythm,  No edema  GI:  Soft, Non distended, Non tender.  +Bowel sounds  Neurologic:  Alert and oriented X 3, normal speech,   Psych:   Good insight. Not anxious nor agitated  Skin:  No rashes, still Pale. No jaundice    Reviewed most current lab test results and cultures  YES  Reviewed most current radiology test results   YES  Review and summation of old records today    NO  Reviewed patient's current orders and MAR    YES  PMH/SH reviewed - no change compared to H&P  ________________________________________________________________________  Care Plan discussed with:    Comments   Patient x    Family      RN x    Care Manager     Consultant  x GI                     Multidiciplinary team rounds were held today with , nursing, pharmacist and clinical coordinator. Patient's plan of care was discussed; medications were reviewed and discharge planning was addressed. ________________________________________________________________________  Total NON critical care TIME: 30   Minutes    Total CRITICAL CARE TIME Spent:   Minutes non procedure based      Comments   >50% of visit spent in counseling and coordination of care x    ________________________________________________________________________  Víctor Walden MD     Procedures: see electronic medical records for all procedures/Xrays and details which were not copied into this note but were reviewed prior to creation of Plan. LABS:  I reviewed today's most current labs and imaging studies.   Pertinent labs include:  Recent Labs     06/30/19 0341 06/29/19 0420 06/28/19 1916   WBC 6.9 6.4 6.9   HGB 7.0* 7.2* 7.7*   HCT 23.8* 23.6* 25.9*    361 352     Recent Labs     06/30/19 0341 06/29/19 0420 06/28/19 1916    140 140   K 3.6 3.5 3.6   * 109* 107   CO2 25 25 27   GLU 94 99 92   BUN 12 14 12   CREA 0.99 0.90 0.91   CA 8.7 9.0 9.2   ALB  --   --  3.3*   TBILI  --   --  0.4   SGOT  --   --  16   ALT  --   --  23       Signed: Paresh Cardoso MD

## 2019-06-30 NOTE — PROGRESS NOTES
Spiritual Care Assessment/Progress Note  [facility]      NAME: Isis Moreland      MRN: [id number]  AGE: 80 y.o.  SEX: female  [employer] Affiliation:    Language: English     6/30/2019     Total Time (in minutes): 45     Spiritual Assessment begun in [facility] 3 MED TELE through conversation with:         [x]Patient        [x] Family    [] Friend(s)        Reason for Consult: Advance medical directive consult     Spiritual beliefs: (Please include comment if needed)     [x] Identifies with a ariela tradition:  Confucianism       [] Supported by a ariela community:            [] Claims no spiritual orientation:           [] Seeking spiritual identity:                [] Adheres to an individual form of spirituality:           [] Not able to assess:                           Identified resources for coping:      [] Prayer                               [] Music                  [] Guided Imagery     [x] Family/friends                 [] Pet visits     [] Devotional reading                         [] Unknown     [] Other:                                             Interventions offered during this visit: (See comments for more details)    Patient Interventions: Advance medical directive completed, Advance medical directive consult, Affirmation of emotions/emotional suffering, Affirmation of ariela, Coping skills reviewed/reinforced, Normalization of emotional/spiritual concerns, Initial/Spiritual assessment, patient floor, Prayer (assurance of)     Family/Friend(s): Advance medical directive completed, Advance medical directive consult, Affirmation of emotions/emotional suffering, Normalization of emotional/spiritual concerns     Plan of Care:     [x] Support spiritual and/or cultural needs    [] Support AMD and/or advance care planning process      [] Support grieving process   [] Coordinate Rites and/or Rituals    [] Coordination with community clergy   [x] No spiritual needs identified at this time   [] Detailed Plan of Care below (See Comments)  [] Make referral to Music Therapy  [] Make referral to Pet Therapy     [] Make referral to Addiction services  [] Make referral to [facility]  [] Make referral to Spiritual Care Partner  [] No future visits requested        [x] Follow up visits as needed     Comments:   Request by Martha Arana to assist with Advance Medical Directive (AMD) in Med Tele. Consulted with patients nurse. Explained document to patient andpatient's family, who were present in the room. Assisted patient in completing the document. Made a copy for patients chart and handed it to patient's nurse. Returned original document and another copy to the patient. Patient is appreciative of 's support and prayer. Patient is well supported by family and friends. [address] Paging Service [phone])

## 2019-06-30 NOTE — PROGRESS NOTES
GI PROGRESS NOTE    NAME:             Isis Bean   :              1932   MRN:              [id number]   Admit Date:     2019  Todays Date:  2019      Subjective:          Abdominal pain: none  Nausea:none   Vomiting:none   No stool    Review of Systems - Respiratory ROS: no cough, shortness of breath, or wheezing  Cardiovascular ROS: no chest pain or dyspnea on exertion  Medications-reviewed     Current Facility-Administered Medications   Medication Dose Route Frequency    PEG 3350-Electrolytes (GO-LYTELY) SUSPENSION 4,000 mL  4,000 mL Oral ONCE    sodium chloride (NS) flush 5-40 mL  5-40 mL IntraVENous Q8H    sodium chloride (NS) flush 5-40 mL  5-40 mL IntraVENous PRN    acetaminophen (TYLENOL) tablet 650 mg  650 mg Oral Q6H PRN    ondansetron (ZOFRAN) injection 4 mg  4 mg IntraVENous Q6H PRN    docusate sodium (COLACE) capsule 100 mg  100 mg Oral DAILY PRN    pantoprazole (PROTONIX) 40 mg in sodium chloride 0.9% 10 mL injection  40 mg IntraVENous Q12H    hydrALAZINE (APRESOLINE) 20 mg/mL injection 10 mg  10 mg IntraVENous Q6H PRN        Objective:     Patient Vitals for the past 8 hrs:   BP Temp Pulse Resp SpO2   19 0758 153/61 97.9 °F (36.6 °C) 64 19 96 %   19 0300 151/59 97.3 °F (36.3 °C) 66 20 98 %     No intake/output data recorded.  1901 -  0700  In: 1350 [P.O.:1340; I.V.:10]  Out: 600 [Urine:600]    EXAM:    Visit Vitals  /61 (BP 1 Location: Right arm, BP Patient Position: At rest)   Pulse 64   Temp 97.9 °F (36.6 °C)   Resp 19   Ht 5' 4\" (1.626 m)   Wt 74.8 kg (165 lb)   SpO2 96%   BMI 28.32 kg/m²     SKIN:  Without rashes or petechiae. HEAD, EYES, EARS, NOSE, AND THROAT:  Sclerae are anicteric. Conjunctivae are pale. Moist mucous membranes. NECK:  Supple. LYMPHATIC:  There is no adenopathy in neck or groin. CHEST:  Clear to auscultation and percussion. HEART:  Reveals regular rate and rhythm.   ABDOMEN:  Soft without hepatosplenomegaly. Nontender. EXTREMITIES:  Without cyanosis, clubbing, or edema        Data Review     Recent Labs     06/30/19  0341 06/29/19  0420   WBC 6.9 6.4   HGB 7.0* 7.2*   HCT 23.8* 23.6*    361     Recent Labs     06/30/19  0341 06/29/19  0420    140   K 3.6 3.5   * 109*   CO2 25 25   BUN 12 14   CREA 0.99 0.90   GLU 94 99   CA 8.7 9.0     Recent Labs     06/28/19  1916   SGOT 16   AP 85   TP 7.2   ALB 3.3*   GLOB 3.9                              Assessment:   1. Gastrointestinal blood loss anemia suspicious for lower gastrointestinal source, in particular neoplasia. Certainly, agree with use of Protonix as with her aspirin usage, even low dose could have an upper gastrointestinal source. The lack of elevated BUN/creatinine ratio makes me feel this is lower. Transfuse as you feel indicated. 2.  Hypertension. 3.  Sinus bradycardia, being followed. Recommend esophagogastroduodenoscopy and colonoscopy for further evaluation. We will start her on clear liquids tomorrow. Transfuse as you feel indicated           Active Problems:    Anemia (6/28/2019)        Plan:   1. As above please consider transfusion. I am concerned that procedures will be too stressful for her and can't r/o asymptomatic heart disease.           Ronda Brown MD

## 2019-06-30 NOTE — ACP (ADVANCE CARE PLANNING)
Request by Gery Heimlich to assist with Advance Medical Directive (AMD) in Med Tele. Consulted with patients nurse. Explained document to patient andpatient's family, who were present in the room. Assisted patient in completing the document. Made a copy for patients chart and handed it to patient's nurse. Returned original document and another copy to the patient. [address]  Paging Service [phone])

## 2019-06-30 NOTE — PROGRESS NOTES
Pt slept for 2.5 hrs. Up to toilet to void, Tech discarded sample needed, will try to collect w next void. Pt continues with inability to tolerate swallowing liquids including lidocaine liq and the remainder of the powdered/mixed  KCL . Taking ice chips. Nausea much improved after reglan and zofran. IV D5NS at 100 ml/hr. Settled for sleep.

## 2019-07-01 ENCOUNTER — APPOINTMENT (OUTPATIENT)
Dept: CT IMAGING | Age: 84
DRG: 330 | End: 2019-07-01
Attending: SPECIALIST
Payer: MEDICARE

## 2019-07-01 ENCOUNTER — ANESTHESIA (OUTPATIENT)
Dept: ENDOSCOPY | Age: 84
DRG: 330 | End: 2019-07-01
Payer: MEDICARE

## 2019-07-01 ENCOUNTER — ANESTHESIA EVENT (OUTPATIENT)
Dept: ENDOSCOPY | Age: 84
DRG: 330 | End: 2019-07-01
Payer: MEDICARE

## 2019-07-01 LAB
ANION GAP SERPL CALC-SCNC: 7 MMOL/L (ref 5–15)
BASOPHILS # BLD: 0.1 K/UL (ref 0–0.1)
BASOPHILS NFR BLD: 1 % (ref 0–1)
BUN SERPL-MCNC: 8 MG/DL (ref 6–20)
BUN/CREAT SERPL: 9 (ref 12–20)
CALCIUM SERPL-MCNC: 8.4 MG/DL (ref 8.5–10.1)
CHLORIDE SERPL-SCNC: 110 MMOL/L (ref 97–108)
CO2 SERPL-SCNC: 27 MMOL/L (ref 21–32)
CREAT SERPL-MCNC: 0.88 MG/DL (ref 0.55–1.02)
DIFFERENTIAL METHOD BLD: ABNORMAL
EOSINOPHIL # BLD: 0.1 K/UL (ref 0–0.4)
EOSINOPHIL NFR BLD: 2 % (ref 0–7)
ERYTHROCYTE [DISTWIDTH] IN BLOOD BY AUTOMATED COUNT: 17.1 % (ref 11.5–14.5)
GLUCOSE SERPL-MCNC: 87 MG/DL (ref 65–100)
HCT VFR BLD AUTO: 24.9 % (ref 35–47)
HGB BLD-MCNC: 7.9 G/DL (ref 11.5–16)
IMM GRANULOCYTES # BLD AUTO: 0 K/UL (ref 0–0.04)
IMM GRANULOCYTES NFR BLD AUTO: 0 % (ref 0–0.5)
LYMPHOCYTES # BLD: 2.1 K/UL (ref 0.8–3.5)
LYMPHOCYTES NFR BLD: 33 % (ref 12–49)
MCH RBC QN AUTO: 22.1 PG (ref 26–34)
MCHC RBC AUTO-ENTMCNC: 31.7 G/DL (ref 30–36.5)
MCV RBC AUTO: 69.6 FL (ref 80–99)
MONOCYTES # BLD: 0.7 K/UL (ref 0–1)
MONOCYTES NFR BLD: 11 % (ref 5–13)
NEUTS SEG # BLD: 3.3 K/UL (ref 1.8–8)
NEUTS SEG NFR BLD: 53 % (ref 32–75)
NRBC # BLD: 0 K/UL (ref 0–0.01)
NRBC BLD-RTO: 0 PER 100 WBC
PLATELET # BLD AUTO: 292 K/UL (ref 150–400)
PMV BLD AUTO: 10 FL (ref 8.9–12.9)
POTASSIUM SERPL-SCNC: 3.2 MMOL/L (ref 3.5–5.1)
POTASSIUM SERPL-SCNC: 3.9 MMOL/L (ref 3.5–5.1)
RBC # BLD AUTO: 3.58 M/UL (ref 3.8–5.2)
RBC MORPH BLD: ABNORMAL
SODIUM SERPL-SCNC: 144 MMOL/L (ref 136–145)
WBC # BLD AUTO: 6.3 K/UL (ref 3.6–11)

## 2019-07-01 PROCEDURE — 74011000250 HC RX REV CODE- 250

## 2019-07-01 PROCEDURE — 84132 ASSAY OF SERUM POTASSIUM: CPT

## 2019-07-01 PROCEDURE — 65660000000 HC RM CCU STEPDOWN

## 2019-07-01 PROCEDURE — 77030003657 HC NDL SCLER BSC -B: Performed by: SPECIALIST

## 2019-07-01 PROCEDURE — 77030021593 HC FCPS BIOP ENDOSC BSC -A: Performed by: SPECIALIST

## 2019-07-01 PROCEDURE — 80048 BASIC METABOLIC PNL TOTAL CA: CPT

## 2019-07-01 PROCEDURE — 76060000032 HC ANESTHESIA 0.5 TO 1 HR: Performed by: SPECIALIST

## 2019-07-01 PROCEDURE — 76040000007: Performed by: SPECIALIST

## 2019-07-01 PROCEDURE — 0DB68ZX EXCISION OF STOMACH, VIA NATURAL OR ARTIFICIAL OPENING ENDOSCOPIC, DIAGNOSTIC: ICD-10-PCS | Performed by: SPECIALIST

## 2019-07-01 PROCEDURE — 74011000250 HC RX REV CODE- 250: Performed by: INTERNAL MEDICINE

## 2019-07-01 PROCEDURE — 88305 TISSUE EXAM BY PATHOLOGIST: CPT

## 2019-07-01 PROCEDURE — 74011636320 HC RX REV CODE- 636/320: Performed by: INTERNAL MEDICINE

## 2019-07-01 PROCEDURE — 74011636320 HC RX REV CODE- 636/320: Performed by: HOSPITALIST

## 2019-07-01 PROCEDURE — 74011250636 HC RX REV CODE- 250/636

## 2019-07-01 PROCEDURE — 74011250636 HC RX REV CODE- 250/636: Performed by: SPECIALIST

## 2019-07-01 PROCEDURE — 74177 CT ABD & PELVIS W/CONTRAST: CPT

## 2019-07-01 PROCEDURE — 88342 IMHCHEM/IMCYTCHM 1ST ANTB: CPT

## 2019-07-01 PROCEDURE — 85025 COMPLETE CBC W/AUTO DIFF WBC: CPT

## 2019-07-01 PROCEDURE — 36415 COLL VENOUS BLD VENIPUNCTURE: CPT

## 2019-07-01 PROCEDURE — 74011250637 HC RX REV CODE- 250/637: Performed by: INTERNAL MEDICINE

## 2019-07-01 PROCEDURE — 77030038665 HC SOL ELEVIEW INJ AGNT ARPH -B: Performed by: SPECIALIST

## 2019-07-01 PROCEDURE — 0DBK8ZX EXCISION OF ASCENDING COLON, VIA NATURAL OR ARTIFICIAL OPENING ENDOSCOPIC, DIAGNOSTIC: ICD-10-PCS | Performed by: SPECIALIST

## 2019-07-01 PROCEDURE — 94760 N-INVAS EAR/PLS OXIMETRY 1: CPT

## 2019-07-01 PROCEDURE — C9113 INJ PANTOPRAZOLE SODIUM, VIA: HCPCS | Performed by: INTERNAL MEDICINE

## 2019-07-01 PROCEDURE — 0DB98ZX EXCISION OF DUODENUM, VIA NATURAL OR ARTIFICIAL OPENING ENDOSCOPIC, DIAGNOSTIC: ICD-10-PCS | Performed by: SPECIALIST

## 2019-07-01 PROCEDURE — 74011250636 HC RX REV CODE- 250/636: Performed by: INTERNAL MEDICINE

## 2019-07-01 RX ORDER — PROPOFOL 10 MG/ML
INJECTION, EMULSION INTRAVENOUS AS NEEDED
Status: DISCONTINUED | OUTPATIENT
Start: 2019-07-01 | End: 2019-07-01 | Stop reason: HOSPADM

## 2019-07-01 RX ORDER — SODIUM CHLORIDE 0.9 % (FLUSH) 0.9 %
5-40 SYRINGE (ML) INJECTION AS NEEDED
Status: DISCONTINUED | OUTPATIENT
Start: 2019-07-01 | End: 2019-07-03

## 2019-07-01 RX ORDER — NALOXONE HYDROCHLORIDE 0.4 MG/ML
0.4 INJECTION, SOLUTION INTRAMUSCULAR; INTRAVENOUS; SUBCUTANEOUS
Status: DISCONTINUED | OUTPATIENT
Start: 2019-07-01 | End: 2019-07-01 | Stop reason: HOSPADM

## 2019-07-01 RX ORDER — GLYCOPYRROLATE 0.2 MG/ML
INJECTION INTRAMUSCULAR; INTRAVENOUS AS NEEDED
Status: DISCONTINUED | OUTPATIENT
Start: 2019-07-01 | End: 2019-07-01 | Stop reason: HOSPADM

## 2019-07-01 RX ORDER — LIDOCAINE HYDROCHLORIDE 20 MG/ML
INJECTION, SOLUTION EPIDURAL; INFILTRATION; INTRACAUDAL; PERINEURAL AS NEEDED
Status: DISCONTINUED | OUTPATIENT
Start: 2019-07-01 | End: 2019-07-01 | Stop reason: HOSPADM

## 2019-07-01 RX ORDER — EPINEPHRINE 0.1 MG/ML
1 INJECTION INTRACARDIAC; INTRAVENOUS
Status: DISCONTINUED | OUTPATIENT
Start: 2019-07-01 | End: 2019-07-01 | Stop reason: HOSPADM

## 2019-07-01 RX ORDER — POTASSIUM CHLORIDE 750 MG/1
40 TABLET, FILM COATED, EXTENDED RELEASE ORAL
Status: COMPLETED | OUTPATIENT
Start: 2019-07-01 | End: 2019-07-01

## 2019-07-01 RX ORDER — ATROPINE SULFATE 0.1 MG/ML
0.5 INJECTION INTRAVENOUS
Status: DISCONTINUED | OUTPATIENT
Start: 2019-07-01 | End: 2019-07-01 | Stop reason: SDUPTHER

## 2019-07-01 RX ORDER — SODIUM CHLORIDE 0.9 % (FLUSH) 0.9 %
10 SYRINGE (ML) INJECTION
Status: COMPLETED | OUTPATIENT
Start: 2019-07-01 | End: 2019-07-01

## 2019-07-01 RX ORDER — SODIUM CHLORIDE 0.9 % (FLUSH) 0.9 %
5-40 SYRINGE (ML) INJECTION EVERY 8 HOURS
Status: DISCONTINUED | OUTPATIENT
Start: 2019-07-01 | End: 2019-07-02 | Stop reason: SDUPTHER

## 2019-07-01 RX ORDER — DEXTROMETHORPHAN/PSEUDOEPHED 2.5-7.5/.8
1.2 DROPS ORAL
Status: DISCONTINUED | OUTPATIENT
Start: 2019-07-01 | End: 2019-07-01 | Stop reason: HOSPADM

## 2019-07-01 RX ORDER — NALOXONE HYDROCHLORIDE 0.4 MG/ML
0.4 INJECTION, SOLUTION INTRAMUSCULAR; INTRAVENOUS; SUBCUTANEOUS
Status: DISCONTINUED | OUTPATIENT
Start: 2019-07-01 | End: 2019-07-01 | Stop reason: SDUPTHER

## 2019-07-01 RX ORDER — SODIUM CHLORIDE 9 MG/ML
75 INJECTION, SOLUTION INTRAVENOUS CONTINUOUS
Status: DISPENSED | OUTPATIENT
Start: 2019-07-01 | End: 2019-07-01

## 2019-07-01 RX ORDER — MIDAZOLAM HYDROCHLORIDE 1 MG/ML
.25-5 INJECTION, SOLUTION INTRAMUSCULAR; INTRAVENOUS
Status: DISCONTINUED | OUTPATIENT
Start: 2019-07-01 | End: 2019-07-01 | Stop reason: HOSPADM

## 2019-07-01 RX ORDER — FLUMAZENIL 0.1 MG/ML
0.2 INJECTION INTRAVENOUS
Status: DISCONTINUED | OUTPATIENT
Start: 2019-07-01 | End: 2019-07-01 | Stop reason: SDUPTHER

## 2019-07-01 RX ORDER — SODIUM CHLORIDE 0.9 % (FLUSH) 0.9 %
5-40 SYRINGE (ML) INJECTION EVERY 8 HOURS
Status: DISCONTINUED | OUTPATIENT
Start: 2019-07-01 | End: 2019-07-03

## 2019-07-01 RX ORDER — DEXTROMETHORPHAN/PSEUDOEPHED 2.5-7.5/.8
1.2 DROPS ORAL
Status: DISCONTINUED | OUTPATIENT
Start: 2019-07-01 | End: 2019-07-01 | Stop reason: SDUPTHER

## 2019-07-01 RX ORDER — EPINEPHRINE 0.1 MG/ML
1 INJECTION INTRACARDIAC; INTRAVENOUS
Status: DISCONTINUED | OUTPATIENT
Start: 2019-07-01 | End: 2019-07-01 | Stop reason: SDUPTHER

## 2019-07-01 RX ORDER — FLUMAZENIL 0.1 MG/ML
0.2 INJECTION INTRAVENOUS
Status: DISCONTINUED | OUTPATIENT
Start: 2019-07-01 | End: 2019-07-01 | Stop reason: HOSPADM

## 2019-07-01 RX ORDER — ATROPINE SULFATE 0.1 MG/ML
0.5 INJECTION INTRAVENOUS
Status: DISCONTINUED | OUTPATIENT
Start: 2019-07-01 | End: 2019-07-01 | Stop reason: HOSPADM

## 2019-07-01 RX ORDER — SODIUM CHLORIDE 0.9 % (FLUSH) 0.9 %
5-40 SYRINGE (ML) INJECTION AS NEEDED
Status: DISCONTINUED | OUTPATIENT
Start: 2019-07-01 | End: 2019-07-02 | Stop reason: SDUPTHER

## 2019-07-01 RX ADMIN — HYDRALAZINE HYDROCHLORIDE 10 MG: 20 INJECTION INTRAMUSCULAR; INTRAVENOUS at 11:50

## 2019-07-01 RX ADMIN — IOHEXOL 40 ML: 240 INJECTION, SOLUTION INTRATHECAL; INTRAVASCULAR; INTRAVENOUS; ORAL at 17:54

## 2019-07-01 RX ADMIN — Medication 10 ML: at 20:11

## 2019-07-01 RX ADMIN — SODIUM CHLORIDE 75 ML/HR: 900 INJECTION, SOLUTION INTRAVENOUS at 14:33

## 2019-07-01 RX ADMIN — IOPAMIDOL 100 ML: 755 INJECTION, SOLUTION INTRAVENOUS at 20:11

## 2019-07-01 RX ADMIN — Medication 5 ML: at 22:06

## 2019-07-01 RX ADMIN — PROPOFOL 280 MG: 10 INJECTION, EMULSION INTRAVENOUS at 15:39

## 2019-07-01 RX ADMIN — Medication 10 ML: at 13:17

## 2019-07-01 RX ADMIN — Medication 10 ML: at 16:27

## 2019-07-01 RX ADMIN — GLYCOPYRROLATE 0.1 MG: 0.2 INJECTION INTRAMUSCULAR; INTRAVENOUS at 15:21

## 2019-07-01 RX ADMIN — LIDOCAINE HYDROCHLORIDE 100 MG: 20 INJECTION, SOLUTION EPIDURAL; INFILTRATION; INTRACAUDAL; PERINEURAL at 15:04

## 2019-07-01 RX ADMIN — SODIUM CHLORIDE 40 MG: 9 INJECTION, SOLUTION INTRAMUSCULAR; INTRAVENOUS; SUBCUTANEOUS at 08:39

## 2019-07-01 RX ADMIN — Medication 10 ML: at 16:26

## 2019-07-01 RX ADMIN — Medication 10 ML: at 08:39

## 2019-07-01 RX ADMIN — SODIUM CHLORIDE 40 MG: 9 INJECTION, SOLUTION INTRAMUSCULAR; INTRAVENOUS; SUBCUTANEOUS at 20:34

## 2019-07-01 RX ADMIN — POTASSIUM CHLORIDE 40 MEQ: 750 TABLET, FILM COATED, EXTENDED RELEASE ORAL at 08:39

## 2019-07-01 NOTE — PROGRESS NOTES
CM consult received to try to make the pt's new PCP blaine't earlier than August.  Per the CM specialist:  [facility](Dr Author Gandhi) entire practice is booked out until August. They would have to be seen at a different practice to be seen earlier.  There is a practice in [facility] that can see them

## 2019-07-01 NOTE — H&P
Pre-endoscopy H and P    The patient was seen and examined in the endoscopy suite. The airway was assessed and docuemented. The problem list, past medical history, and medications were reviewed. Patient Active Problem List   Diagnosis Code    Anemia D64.9     Social History     Socioeconomic History    Marital status:      Spouse name: Not on file    Number of children: Not on file    Years of education: Not on file    Highest education level: Not on file   Occupational History    Not on file   Social Needs    Financial resource strain: Not on file    Food insecurity:     Worry: Not on file     Inability: Not on file    Transportation needs:     Medical: Not on file     Non-medical: Not on file   Tobacco Use    Smoking status: Never Smoker    Smokeless tobacco: Never Used   Substance and Sexual Activity    Alcohol use: Never     Frequency: Never    Drug use: Never    Sexual activity: Not on file   Lifestyle    Physical activity:     Days per week: Not on file     Minutes per session: Not on file    Stress: Not on file   Relationships    Social connections:     Talks on phone: Not on file     Gets together: Not on file     Attends Jehovah's witness service: Not on file     Active member of club or organization: Not on file     Attends meetings of clubs or organizations: Not on file     Relationship status: Not on file    Intimate partner violence:     Fear of current or ex partner: Not on file     Emotionally abused: Not on file     Physically abused: Not on file     Forced sexual activity: Not on file   Other Topics Concern    Not on file   Social History Narrative    Not on file     Past Medical History:   Diagnosis Date    Hypertension      The patient has a family history of na    Prior to Admission Medications   Prescriptions Last Dose Informant Patient Reported? Taking?   nebivolol (BYSTOLIC) 5 mg tablet 9/32/3505 at 0800  Yes Yes   Sig: daily.    valsartan (DIOVAN) 160 mg tablet 6/27/2019 at 0800  Yes Yes   Sig: Take 160 mg by mouth daily. valsartan-hydroCHLOROthiazide (DIOVAN-HCT) 160-12.5 mg per tablet 6/25/2019 at 0800  Yes Yes   Sig: Take 1 Tab by mouth every other day. Facility-Administered Medications: None           The review of systems is:  negative for shortness of breath or chest pain      The heart, lungs, and mental status were satisfactory for the administration of anesthesa sedation and for the procedure. Lab Results   Component Value Date/Time    WBC 6.3 07/01/2019 04:20 AM    HGB 7.9 (L) 07/01/2019 04:20 AM    HCT 24.9 (L) 07/01/2019 04:20 AM    PLATELET 520 38/08/8071 04:20 AM    MCV 69.6 (L) 07/01/2019 04:20 AM     Lab Results   Component Value Date/Time    Iron 16 (L) 06/28/2019 07:16 PM    TIBC 495 (H) 06/28/2019 07:16 PM    Iron % saturation 3 (L) 06/28/2019 07:16 PM     No results found for: FERR    I discussed with the patient the objectives, risks, consequences and alternatives to the procedure.       Bibi Chatman MD  7/1/2019  3:03 PM

## 2019-07-01 NOTE — PROGRESS NOTES
Endoscope was pre-cleaned at the bedside immediately following procedure by [facility]    Anesthesia reports 280mg Propofol, 100mg Lidocaine and 800mL NS given during procedure. Received report from anesthesia staff on vital signs and status of patient.

## 2019-07-01 NOTE — PROGRESS NOTES
Hospitalist Progress Note    NAME: Isis Oliver   :  1932   MRN:  [id number]       Assessment / Plan:  Acute Blood Loss Anemia, baseline Hgb in  10-11 range per review of outside records, Patient's Hgb today is 7.0, patient with symptoms of anemia and case discussed with Dr. 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 Cas Tubbs, Patient aware of diagnosis, CLD st this time, will need EUS prior to duodenal polyp removal and CT scan/CRS consult ordered for further eval of colonic mass  -continue IV PPI     Hypertension with Hypertensive Urgency on presentation   Sinus Bradycardia: resolved  -Holding Bystolic for now (held yesterday with BP being lower limit of normal)    Hypokalemia:  -replete and monitor     25.0 - 29.9 Overweight / Body mass index is 28.32 kg/m².     Code status: DNR  Prophylaxis: SCD's  Recommended Disposition: Home w/Family     Subjective:     Chief Complaint / Reason for Physician Visit: follow-up weakness, anemia  Patient seen for follow-up  Upset about not eating (seen prior to endoscopy)  Feels better after trwansfusion on     Review of Systems:  Symptom Y/N Comments  Symptom Y/N Comments   Fever/Chills    Chest Pain n    Poor Appetite    Edema     Cough    Abdominal Pain n    Sputum    Joint Pain     SOB/MCPHERSON n resolved  Pruritis/Rash     Nausea/vomit  No vomiting  Tolerating PT/OT     Diarrhea    Tolerating Diet     Constipation    Other       Could NOT obtain due to:      Objective:     VITALS:   Last 24hrs VS reviewed since prior progress note.  Most recent are:  Patient Vitals for the past 24 hrs:   Temp Pulse Resp BP SpO2   19 0716 97.7 °F (36.5 °C) 67 18 150/48 95 %   19 0337 98.5 °F (36.9 °C) 68 18 146/48 96 %   19 0001 98.4 °F (36.9 °C) 73 18 157/61 99 % 06/30/19 1943 98.3 °F (36.8 °C) 63 18 148/52 100 %   06/30/19 1739 98.2 °F (36.8 °C) 68 18 153/50 98 %   06/30/19 1550 97.3 °F (36.3 °C) 80 20 143/55 97 %   06/30/19 1450 98.2 °F (36.8 °C) 60 18 134/48 98 %   06/30/19 1347 97.7 °F (36.5 °C) 76 20 137/50 100 %   06/30/19 1332 97.6 °F (36.4 °C) (!) 58 18 157/69 98 %   06/30/19 1251 97.6 °F (36.4 °C) 64 20 146/69 97 %   06/30/19 1156 97.7 °F (36.5 °C) 67 18 146/51 98 %       Intake/Output Summary (Last 24 hours) at 7/1/2019 0947  Last data filed at 6/30/2019 2242  Gross per 24 hour   Intake 4386.3 ml   Output    Net 4386.3 ml        PHYSICAL EXAM:  General: WD, WN. Alert, cooperative, no acute distress    EENT:  EOMI. Anicteric sclerae. MM dry  Resp:  CTA bilaterally, no wheezing or rales. No accessory muscle use  CV:  Regular  rhythm,  No edema  GI:  Soft, Non distended, Non tender.  +Bowel sounds  Neurologic:  Alert and oriented X 3, normal speech,   Psych:   Good insight. Not anxious nor agitated  Skin:  No rashes, still Pale. No jaundice    Reviewed most current lab test results and cultures  YES  Reviewed most current radiology test results   YES  Review and summation of old records today    NO  Reviewed patient's current orders and MAR    YES  PMH/SH reviewed - no change compared to H&P  ________________________________________________________________________  Care Plan discussed with:    Comments   Patient x    Family      RN x    Care Manager     Consultant  x Dr. Pauline De La Cruz team rounds were held today with , nursing, pharmacist and clinical coordinator. Patient's plan of care was discussed; medications were reviewed and discharge planning was addressed.      ________________________________________________________________________  Total NON critical care TIME: 30   Minutes    Total CRITICAL CARE TIME Spent:   Minutes non procedure based      Comments   >50% of visit spent in counseling and coordination of care x ________________________________________________________________________  Grey Calloway MD     Procedures: see electronic medical records for all procedures/Xrays and details which were not copied into this note but were reviewed prior to creation of Plan. LABS:  I reviewed today's most current labs and imaging studies.   Pertinent labs include:  Recent Labs     07/01/19 0420 06/30/19 0341 06/29/19 0420   WBC 6.3 6.9 6.4   HGB 7.9* 7.0* 7.2*   HCT 24.9* 23.8* 23.6*    338 361     Recent Labs     07/01/19 0420 06/30/19 0341 06/29/19 0420 06/28/19  1916    141 140 140   K 3.2* 3.6 3.5 3.6   * 110* 109* 107   CO2 27 25 25 27   GLU 87 94 99 92   BUN 8 12 14 12   CREA 0.88 0.99 0.90 0.91   CA 8.4* 8.7 9.0 9.2   ALB  --   --   --  3.3*   TBILI  --   --   --  0.4   SGOT  --   --   --  16   ALT  --   --   --  23       Signed: Grey Calloway MD

## 2019-07-01 NOTE — ANESTHESIA POSTPROCEDURE EVALUATION
Procedure(s):  ESOPHAGOGASTRODUODENOSCOPY (EGD)  COLONOSCOPY  ESOPHAGOGASTRODUODENAL (EGD) BIOPSY  COLON BIOPSY  INJECTION. total IV anesthesia    Anesthesia Post Evaluation        Patient location during evaluation: PACU  Note status: Adequate. Level of consciousness: responsive to verbal stimuli and sleepy but conscious  Pain management: satisfactory to patient  Airway patency: patent  Anesthetic complications: no  Cardiovascular status: acceptable  Respiratory status: acceptable  Hydration status: acceptable  Comments: +Post-Anesthesia Evaluation and Assessment    Patient: Isis Elizabeth MRN: [id number]  SSN: [id number]   YOB: 1932  Age: 80 y.o. Sex: female      Cardiovascular Function/Vital Signs    /60   Pulse 76   Temp 36.7 °C (98 °F)   Resp 20   Ht 5' 4\" (1.626 m)   Wt 74.8 kg (165 lb)   SpO2 100%   BMI 28.32 kg/m²     Patient is status post Procedure(s):  ESOPHAGOGASTRODUODENOSCOPY (EGD)  COLONOSCOPY  ESOPHAGOGASTRODUODENAL (EGD) BIOPSY  COLON BIOPSY  INJECTION. Nausea/Vomiting: Controlled. Postoperative hydration reviewed and adequate. Pain:  Pain Scale 1: Numeric (0 - 10) (07/01/19 1550)  Pain Intensity 1: 0 (07/01/19 1550)   Managed. Neurological Status: At baseline. Mental Status and Level of Consciousness: Arousable. Pulmonary Status:   O2 Device: Room air (07/01/19 1550)   Adequate oxygenation and airway patent. Complications related to anesthesia: None    Post-anesthesia assessment completed. No concerns. Signed By: Germán Shelley DO    7/1/2019  Post anesthesia nausea and vomiting:  controlled      Vitals Value Taken Time   /65 7/1/2019  3:52 PM   Temp     Pulse 75 7/1/2019  3:53 PM   Resp 21 7/1/2019  3:53 PM   SpO2 98 % 7/1/2019  3:53 PM   Vitals shown include unvalidated device data.

## 2019-07-01 NOTE — PROCEDURES
Esophagogastroduodenoscopy    Indications:  Iron deficiency anemia    Medications:  See anesthesia form    Assistants:  Carie Garcia Rn      Post procedure diagnosis:  Duodenal polyp  Small reducible hh  Description of Procedure:    Prior to the procedure its objectives, risks, consequences and alternatives were discussed with the patient who then elected to proceed. The Olympus video endoscope was inserted under direct vision into the mouth and then into the esophagus. The esophagus looked normal.    The z-line was located at 39cm. Intermittently there was a 1 cm hiatal hernia with the diaphragm pinch at 40cm. There were no diagnostic abnormalities of the body, fundus, antrum, cardia and incisura of the stomach. This included direct and retroflexion examination. The first and second portion of the duodenum appeared normal.  About 4 cm distal to the papilla (see image) there was a 10x 18 mm flat, lobulated polyp. I took cold biopsies. I took duodenal biopsies and gastric biopsies      Complications: There were no apparent complications and the patient tolerated the procedure well.         Implants:  none    Estimated Blood Loss:  none  Specimens Removed:  Duodenal polyp  Duodenum  stomach  Impressions:  Duodenal polyp, non bleeding, important finding but may not explain anemia, not removed--biopsies obtained  Small reducible hiatal hernia  No blood in upper gi tract      Signed By: Ratna Forman MD                        July 1, 2019     3:16 PM

## 2019-07-01 NOTE — PROGRESS NOTES
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 Severo Kempf  4) The duodenal polyp can likely be resected endoscopially with eus prior.   Biopsy results will determine whether to resect this prior to any surgery on the colon  5) discussed with daughter and son in law    Jenaro Mccarthy MD  3:41 PM  7/1/2019

## 2019-07-01 NOTE — PROCEDURES
Colonoscopy    Indications: iron deficiency anemia    Pre-operative Diagnosis: see above    Assistant(s):  see chart Sumi chung    Medications:  See anesthesia form    Post-operative Diagnosis:    Colon: Ascending mass            Procedure Details   Prior to the procedure its objectives, risks, consequences and alternatives were discussed with the patient who then elected to proceed. All questions were answered. Digital Rectal Exam:  was normal     The Olympus videocolonoscope was inserted in the rectum and advanced to the cecum. At 80 cm there was a mass. It did not obstruct the scope but it occupied 270 degrees of the lumen. It was lobulated and had cental ulceration. It appeared to involve the valve. I was able to get the scope more proximally and I identified the appendical orifice (see photo)  The colonoscope was slowly and carefully withdrawn as the mucosa was inspected. I took multiple biopsies of the mass. I injected indial ink 1 ml in three different locations distal to the mass. Photos to document the ileocecal valve, appendiceal orifice and retroflexion exam were obtained. The preparation was adequate      Estimated Blood Loss:  Less than 5 ml    Specimens:  Mass right colon    Findings:  Mass right colon, s/p biopsy and tattoo    Complications:  none    Implants:  none    Repeat colonoscopy is recommended in:  One year (after successful surgery.                David Peterson MD  3:43 PM  7/1/2019

## 2019-07-01 NOTE — PROGRESS NOTES
Colorectal surgery consult called to Dr. Florencio Sam for tomorrow. Will fax over facesheet as requested. 1830- Pt states she cannot drink the oral contrast for the CT. Paged physician to determine if they  Want to precede with the imaging. 1915- On call GI called and ordered to proceed with CT scan.

## 2019-07-01 NOTE — ANESTHESIA PREPROCEDURE EVALUATION
Relevant Problems   No relevant active problems       Anesthetic History     PONV          Review of Systems / Medical History  Patient summary reviewed, nursing notes reviewed and pertinent labs reviewed    Pulmonary  Within defined limits                 Neuro/Psych   Within defined limits           Cardiovascular    Hypertension: well controlled              Exercise tolerance: <4 METS     GI/Hepatic/Renal  Within defined limits              Endo/Other        Arthritis and anemia     Other Findings            Physical Exam    Airway  Mallampati: II  TM Distance: 4 - 6 cm  Neck ROM: normal range of motion   Mouth opening: Normal     Cardiovascular    Rhythm: regular  Rate: normal         Dental    Dentition: Caps/crowns, Upper dentition intact and Lower dentition intact     Pulmonary  Breath sounds clear to auscultation               Abdominal  GI exam deferred       Other Findings            Anesthetic Plan    ASA: 3  Anesthesia type: total IV anesthesia          Induction: Intravenous  Anesthetic plan and risks discussed with: Patient and Son / Daughter      Beta blocker has been discontinued

## 2019-07-01 NOTE — PROGRESS NOTES
TRANSFER - IN REPORT:    Verbal report received from Radha(name) on June Donel Rhea  being received from [facility](unit) for ordered procedure      Report consisted of patients Situation, Background, Assessment and   Recommendations(SBAR). Information from the following report(s) SBAR, MAR and Recent Results was reviewed with the receiving nurse. Opportunity for questions and clarification was provided. Assessment completed upon patients arrival to unit and care assumed.

## 2019-07-01 NOTE — PROGRESS NOTES
Patient completed bowel prep and states stool is running clear. Not on any blood thinners. Hgb up to 7.9 after blood transfusion. No active GIB. Stool was +for occult blood and lab evaluation revealed CASPER. K was 3.2 this AM but states she took oral KCl. I reveiwed risks and benefits of procedures with patient and she is ready to proceed. She is NPO and has been seen by anesthesia.     AIDAN Quan  07/01/19  10:28 AM

## 2019-07-01 NOTE — PROGRESS NOTES
TRANSFER - OUT REPORT:    Verbal report given to [facility] RN (name) on June Gifford Jaylene  being transferred to [facility](unit) for ordered procedure       Report consisted of patients Situation, Background, Assessment and   Recommendations(SBAR). Information from the following report(s) SBAR, Kardex, [facility] and Recent Results was reviewed with the receiving nurse.     Lines:   Peripheral IV 06/30/19 Right;Upper Arm (Active)   Site Assessment Clean, dry, & intact 7/1/2019  7:57 AM   Phlebitis Assessment 0 7/1/2019  7:57 AM   Infiltration Assessment 0 7/1/2019  7:57 AM   Dressing Status Clean, dry, & intact 7/1/2019  7:57 AM   Dressing Type Transparent 7/1/2019  7:57 AM   Hub Color/Line Status Pink;Flushed;Capped 7/1/2019  7:57 AM   Action Taken Open ports on tubing capped 6/30/2019  7:43 PM   Alcohol Cap Used Yes 7/1/2019  7:57 AM

## 2019-07-02 ENCOUNTER — ANESTHESIA EVENT (OUTPATIENT)
Dept: SURGERY | Age: 84
DRG: 330 | End: 2019-07-02
Payer: MEDICARE

## 2019-07-02 LAB
ANION GAP SERPL CALC-SCNC: 8 MMOL/L (ref 5–15)
BASOPHILS # BLD: 0 K/UL (ref 0–0.1)
BASOPHILS NFR BLD: 1 % (ref 0–1)
BUN SERPL-MCNC: 6 MG/DL (ref 6–20)
BUN/CREAT SERPL: 7 (ref 12–20)
CALCIUM SERPL-MCNC: 8.4 MG/DL (ref 8.5–10.1)
CEA SERPL-MCNC: 1.6 NG/ML
CHLORIDE SERPL-SCNC: 109 MMOL/L (ref 97–108)
CO2 SERPL-SCNC: 24 MMOL/L (ref 21–32)
COMMENT, HOLDF: NORMAL
CREAT SERPL-MCNC: 0.84 MG/DL (ref 0.55–1.02)
DIFFERENTIAL METHOD BLD: ABNORMAL
EOSINOPHIL # BLD: 0.1 K/UL (ref 0–0.4)
EOSINOPHIL NFR BLD: 1 % (ref 0–7)
ERYTHROCYTE [DISTWIDTH] IN BLOOD BY AUTOMATED COUNT: 18.4 % (ref 11.5–14.5)
FERRITIN SERPL-MCNC: 106 NG/ML (ref 8–252)
GLUCOSE SERPL-MCNC: 92 MG/DL (ref 65–100)
HCT VFR BLD AUTO: 27.3 % (ref 35–47)
HGB BLD-MCNC: 8.3 G/DL (ref 11.5–16)
IMM GRANULOCYTES # BLD AUTO: 0 K/UL (ref 0–0.04)
IMM GRANULOCYTES NFR BLD AUTO: 0 % (ref 0–0.5)
LYMPHOCYTES # BLD: 1.7 K/UL (ref 0.8–3.5)
LYMPHOCYTES NFR BLD: 22 % (ref 12–49)
MCH RBC QN AUTO: 21.5 PG (ref 26–34)
MCHC RBC AUTO-ENTMCNC: 30.4 G/DL (ref 30–36.5)
MCV RBC AUTO: 70.7 FL (ref 80–99)
MONOCYTES # BLD: 0.7 K/UL (ref 0–1)
MONOCYTES NFR BLD: 9 % (ref 5–13)
NEUTS SEG # BLD: 5.3 K/UL (ref 1.8–8)
NEUTS SEG NFR BLD: 67 % (ref 32–75)
NRBC # BLD: 0 K/UL (ref 0–0.01)
NRBC BLD-RTO: 0 PER 100 WBC
PLATELET # BLD AUTO: 277 K/UL (ref 150–400)
PMV BLD AUTO: 9.5 FL (ref 8.9–12.9)
POTASSIUM SERPL-SCNC: 3.6 MMOL/L (ref 3.5–5.1)
RBC # BLD AUTO: 3.86 M/UL (ref 3.8–5.2)
SAMPLES BEING HELD,HOLD: NORMAL
SODIUM SERPL-SCNC: 141 MMOL/L (ref 136–145)
WBC # BLD AUTO: 7.9 K/UL (ref 3.6–11)

## 2019-07-02 PROCEDURE — 36415 COLL VENOUS BLD VENIPUNCTURE: CPT

## 2019-07-02 PROCEDURE — 74011250636 HC RX REV CODE- 250/636: Performed by: INTERNAL MEDICINE

## 2019-07-02 PROCEDURE — 85025 COMPLETE CBC W/AUTO DIFF WBC: CPT

## 2019-07-02 PROCEDURE — 80048 BASIC METABOLIC PNL TOTAL CA: CPT

## 2019-07-02 PROCEDURE — 82728 ASSAY OF FERRITIN: CPT

## 2019-07-02 PROCEDURE — 74011000250 HC RX REV CODE- 250: Performed by: INTERNAL MEDICINE

## 2019-07-02 PROCEDURE — C9113 INJ PANTOPRAZOLE SODIUM, VIA: HCPCS | Performed by: INTERNAL MEDICINE

## 2019-07-02 PROCEDURE — 65660000000 HC RM CCU STEPDOWN

## 2019-07-02 PROCEDURE — 97161 PT EVAL LOW COMPLEX 20 MIN: CPT | Performed by: PHYSICAL THERAPIST

## 2019-07-02 PROCEDURE — 82378 CARCINOEMBRYONIC ANTIGEN: CPT

## 2019-07-02 RX ADMIN — Medication 5 ML: at 05:10

## 2019-07-02 RX ADMIN — SODIUM CHLORIDE 40 MG: 9 INJECTION, SOLUTION INTRAMUSCULAR; INTRAVENOUS; SUBCUTANEOUS at 21:35

## 2019-07-02 RX ADMIN — Medication 10 ML: at 13:05

## 2019-07-02 RX ADMIN — Medication 5 ML: at 21:35

## 2019-07-02 RX ADMIN — SODIUM CHLORIDE 40 MG: 9 INJECTION, SOLUTION INTRAMUSCULAR; INTRAVENOUS; SUBCUTANEOUS at 09:35

## 2019-07-02 RX ADMIN — IRON SUCROSE 300 MG: 20 INJECTION, SOLUTION INTRAVENOUS at 13:02

## 2019-07-02 NOTE — PROGRESS NOTES
Reason for Admission:   Colon obstruction                   RRAT Score:          5           Plan for utilizing home health: May benefit                    Current Advanced Directive/Advance Care Plan: not on chart                         Transition of Care Plan:                  Pt admitted with anemia. Pt having several tests and transfusions.   She has Medicare//AARP, DNR, independent, lives [facility], recently moved here from [facility], son in law Nataliia Jaramillo, works at [employer], no dme, and has a new Dr Sahil Bee PCP blaine't in August.      Need to confirm DDNR is complete  PCP date in August  Possible SNF at d/c-walked 200 feet with PT(no needs)  No preferred Rx in [facility]  Await therapy consults after surgery      Care Management Interventions  Transition of Care Consult (CM Consult): Discharge Planning  MyChart Signup: No  Discharge Durable Medical Equipment: No  Physical Therapy Consult: Yes  Occupational Therapy Consult: Yes  Speech Therapy Consult: No  Confirm Follow Up Transport: Other (see comment)  Plan discussed with Pt/Family/Caregiver: Yes  Freedom of Choice Offered: Yes  Discharge Location  Discharge Placement: Home

## 2019-07-02 NOTE — PROGRESS NOTES
Problem: Mobility Impaired (Adult and Pediatric)  Goal: *Acute Goals and Plan of Care (Insert Text)  Description  Physical Therapy Goals  Initiated 7/2/2019    1. Patient will transfer from bed to chair and chair to bed with independence using the least restrictive device within 7 day(s). 2.  Patient will ambulate with independence for 250 feet with the least restrictive device within 7 day(s). 3.  Patient will ascend/descend 5 stairs with 1 handrail(s) with supervision/set-up within 7 day(s). Note:   PHYSICAL THERAPY EVALUATION  Patient: Isis Maya (80 y.o. female)  Date: 7/2/2019  Primary Diagnosis: Anemia [D64.9]  Procedure(s) (LRB):  ESOPHAGOGASTRODUODENOSCOPY (EGD) (N/A)  COLONOSCOPY (N/A)  ESOPHAGOGASTRODUODENAL (EGD) BIOPSY (N/A)  COLON BIOPSY (N/A)  INJECTION (N/A) 1 Day Post-Op   Precautions: standard      ASSESSMENT :  Based on the objective data described below, the patient presents with good strength, mobility skills. Patient with overall good balance except for one LOB when distracted. Up in chair upon arrival.  Sit to stand without any assistance. Patient ambulated 200' with SBA with good balance with the exception of one minor LOB when talking to someone. Patient also able to ambulate up and down 3 steps with one handrail and CGA. Patient returned to chair at end of session. Prior to admission patient was independent with all mobility and did not use assistive device. Patient lives with daughter and son-in-law but is alone during the day. Currently, patient does not have further PT needs but patient scheduled for surgery tomorrow. Will continue to follow until after surgery and re-assess any PT recommendations. Patient will benefit from skilled intervention to address the above impairments. Patient?s rehabilitation potential is considered to be Excellent  Factors which may influence rehabilitation potential include:   ? None noted  ? Mental ability/status  ? Medical condition  ? Home/family situation and support systems  ? Safety awareness  ? Pain tolerance/management  ? Other:      PLAN :  Recommendations and Planned Interventions:  ?           Bed Mobility Training             ? Neuromuscular Re-Education  ? Transfer Training                   ? Orthotic/Prosthetic Training  ? Gait Training                         ? Modalities  ? Therapeutic Exercises           ? Edema Management/Control  ? Therapeutic Activities            ? Patient and Family Training/Education  ? Other (comment):    Frequency/Duration: Patient will be followed by physical therapy  3 times a week to address goals. Discharge Recommendations: None (pending surgery)  Further Equipment Recommendations for Discharge: none      SUBJECTIVE:   Patient stated ? I just got out of the shower. ?    OBJECTIVE DATA SUMMARY:   HISTORY:    Past Medical History:   Diagnosis Date    Hypertension      Past Surgical History:   Procedure Laterality Date    COLONOSCOPY N/A 7/1/2019    COLONOSCOPY performed by Dory Hernandez MD at [facility] ENDOSCOPY    COLONOSCOPY,DIAGNOSTIC  7/1/2019         COLONOSCPY,FLEX,W/ [address] INJECT  7/1/2019         HX ORTHOPAEDIC      tia. hip replacement    UPPER GI ENDOSCOPY,BIOPSY  7/1/2019          Prior Level of Function/Home Situation: Independent with all mobility; lives with daughter  Personal factors and/or comorbidities impacting plan of care: TBD after surgery    Home Situation  Home Environment: Private residence  # Steps to Enter: 3  Rails to Enter: Yes  Hand Rails : Left  One/Two Story Residence: One story  Living Alone: No  Support Systems: Child(roxana)  Patient Expects to be Discharged to[de-identified] Private residence  Current DME Used/Available at Home: [employer]    EXAMINATION/PRESENTATION/DECISION MAKING:   Critical Behavior:  Neurologic State: Alert  Orientation Level: Oriented X4  Cognition: Appropriate decision making, Follows commands  Safety/Judgement: Awareness of environment  Hearing: Auditory  Auditory Impairment: Hard of hearing, left side  Skin:  intact, some bruising  Edema: none noted  Range Of Motion:  AROM: Within functional limits           PROM: Within functional limits           Strength:    Strength: Within functional limits                    Tone & Sensation:   Tone: Normal                              Coordination:  Coordination: Within functional limits  Vision:      Functional Mobility:  Bed Mobility:   Up in chair upon arrival           Transfers:  Sit to Stand: Independent  Stand to Sit: Independent        Bed to Chair: Independent              Balance:   Sitting: Intact  Standing: Intact  Ambulation/Gait Training:  Distance (ft): 200 Feet (ft)  Assistive Device: Gait belt  Ambulation - Level of Assistance: Stand-by assistance                       Speed/Maira: Fluctuations                        Stairs:  Number of Stairs Trained: 3  Stairs - Level of Assistance: Contact guard assistance   Rail Use: Left         Functional Measure:  Barthel Index:    Bathin  Bladder: 10  Bowels: 10  Groomin  Dressing: 10  Feeding: 10  Mobility: 15  Stairs: 10  Toilet Use: 10  Transfer (Bed to Chair and Back): 15  Total: 100/100       Percentage of impairment   0%   1-19%   20-39%   40-59%   60-79%   80-99%   100%   Barthel Score 0-100 100 99-80 79-60 59-40 20-39 1-19   0     The Barthel ADL Index: Guidelines  1. The index should be used as a record of what a patient does, not as a record of what a patient could do. 2. The main aim is to establish degree of independence from any help, physical or verbal, however minor and for whatever reason. 3. The need for supervision renders the patient not independent. 4. A patient's performance should be established using the best available evidence.  Asking the patient, friends/relatives and nurses are the usual sources, but direct observation and common sense are also important. However direct testing is not needed. 5. Usually the patient's performance over the preceding 24-48 hours is important, but occasionally longer periods will be relevant. 6. Middle categories imply that the patient supplies over 50 per cent of the effort. 7. Use of aids to be independent is allowed. Starr Gordon., Barthel, D.W. (6945). Functional evaluation: the Barthel Index. [address] (14)2. Candy Purdy bonnie LAVON Whitfield, Maxx Pereira., Ann Becker., Angel, [address] (1999). Measuring the change indisability after inpatient rehabilitation; comparison of the responsiveness of the Barthel Index and Functional [place] Measure. Journal of Neurology, Neurosurgery, and Psychiatry, 66(4), 872-974. KRISTIE Aguilera, BRUNA Novak, & Rodolfo Grant, MRAUL. (2004.) Assessment of post-stroke quality of life in cost-effectiveness studies: The usefulness of the Barthel Index and the EuroQoL-5D. Quality of Life Research, 15, 273-20            Physical Therapy Evaluation Charge Determination   History Examination Presentation Decision-Making   MEDIUM  Complexity : 1-2 comorbidities / personal factors will impact the outcome/ POC  LOW Complexity : 1-2 Standardized tests and measures addressing body structure, function, activity limitation and / or participation in recreation  LOW Complexity : Stable, uncomplicated  LOW Complexity : FOTO score of       Based on the above components, the patient evaluation is determined to be of the following complexity level: LOW     Pain:  Pain Scale 1: Numeric (0 - 10)  Pain Intensity 1: 0              Activity Tolerance:   good  Please refer to the flowsheet for vital signs taken during this treatment. After treatment:   ?         Patient left in no apparent distress sitting up in chair  ? Patient left in no apparent distress in bed  ? Call bell left within reach  ? Nursing notified  ? Caregiver present  ? Bed alarm activated    COMMUNICATION/EDUCATION:   The patient?s plan of care was discussed with: Registered Nurse. ?         Fall prevention education was provided and the patient/caregiver indicated understanding. ? Patient/family have participated as able in goal setting and plan of care. ?         Patient/family agree to work toward stated goals and plan of care. ?         Patient understands intent and goals of therapy, but is neutral about his/her participation. ? Patient is unable to participate in goal setting and plan of care.     Thank you for this referral.  Julio Cesar Gan, PT   Time Calculation: 14 mins

## 2019-07-02 NOTE — CONSULTS
Hematology Oncology Consultation    Reason for consult: colon cancer, assistance with decisions. Admitting Diagnosis: Anemia [D64.9]    Impression: colon cancer - Mrs. Danelle Hurley has met with Dr. Florencio Sam earlier today and is agreeable to pursuing surgical resections. She had expressed earlier to concerned family members that at age 80, she had lived a good life and was not interested in aggressive measures but after hearing how folks die with unresected colon cancer, has decided in favor of surgery. She is notably anemic and got one unit of PRBCs and 100 mg of IV iron. I will give more iron intravenously today and tomorrow AM in effort to expedite erythropoiesis. I have reviewed with her future followup plans and surveillance strategies. I will abide by whatever she allows. Her daughter and son in law were present during the consultation. Discussion: Isis Matthew is a  80y.o. year old seen in consultation at the request of Dr. Oscar Galvin for a new diagnosis of colon cancer. She has not felt well for at least 6-8 weeks and her family members noted she was moving more slowly and was more easily wnded.  Pascual Ruiz reports that her symptoms really got worse in the last 4 days.   She denied having abdominal pain, diarrhea, constipation, nausea or vomiting.  She does report mild exertional shortness of breath but no chest pain or palpitations.  She has noted no change in bowel habits nor has seen obvious bleeding or melena. In the [facility] ER,  she was noted to have slight bradycardia.  On lab work she was noted to have a hemoglobin of 7.7  Her BMP was within normal limits.  Chest x-ray which showed no evidence of acute process.  EKG showed sinus bradycardia.  He had a rectal exam in the emergency department which showed evidence of positive occult blood test. She was transfused one unit of PRBCs. She underwent an EGD and colonoscopy on 7/1/19 by Dr. Emmie Sage. The EGD was fine but on colonsocopy, the following was noted:  At 80 cm there was a mass. It did not obstruct the scope but it occupied 270 degrees of the lumen. It was lobulated and had cental ulceration. It appeared to involve the valve. Imaging:  CT scan 7/1/19:  FINDINGS:   LUNG BASES: Clear. INCIDENTALLY IMAGED HEART AND MEDIASTINUM: No markable. Coronary artery  calcifications noted. LIVER: Subcentimeter hypodense lesions which are incompletely characterized. No  biliary ductal dilation. GALLBLADDER: Unremarkable. SPLEEN: No mass. PANCREAS: No mass or ductal dilatation. ADRENALS: Unremarkable. KIDNEYS: No mass, calculus, or hydronephrosis. STOMACH: Unremarkable. SMALL BOWEL: No dilatation or wall thickening. COLON: Circumferential wall thickening compatible with known colon cancer at the  right colon just distal to the ileocecal valve. No dilation or wall thickening  otherwise. APPENDIX: Not seen. No secondary signs of appendicitis. PERITONEUM: No ascites or pneumoperitoneum. Small lymph nodes in the ileocolic  region are not pathologically enlarged by CT criteria (series 2, image 46). RETROPERITONEUM: Atherosclerotic calcination without aneurysm or dissection. No  enlarged lymphadenopathy. REPRODUCTIVE ORGANS: Partially obscured by streak artifact. Uterus and ovaries  appear unremarkable. URINARY BLADDER: Partially obscured by streak artifact. No abnormalities  identified. BONES: Degenerative spine change. Bilateral hip arthroplasty prostheses generate  streak artifact which obscures adjacent tissue. Degenerative spine change with  grade 1 anterolisthesis of L4 on L5 with multilevel degenerative disc change and  vacuum phenomenon. No aggressive lesion or acute fracture. IMPRESSION: Right colon cancer mass is evident with small ileocolic region lymph  nodes which are not pathologically enlarged by CT criteria.  There is no  identified typical metastatic disease though several small hepatic hypodensities  are incompletely characterized and while the marrow present cyst metastatic  deposits are not entirely excluded      Labs:  CBC: 6/28/2019: HCT 25.9 %* (Ref range: 35.0 - 47.0 %); HGB 7.7 g/dL* (Ref range: 11.5 - 16.0 g/dL); PLATELET 561 K/uL (Ref range: 150 - 400 K/uL); RBC 3.79 M/uL* (Ref range: 3.80 - 5.20 M/uL); WBC 6.9 K/uL (Ref range: 3.6 - 11.0 K/uL)  6/29/2019: HCT 23.6 %* (Ref range: 35.0 - 47.0 %); HGB 7.2 g/dL* (Ref range: 11.5 - 16.0 g/dL); PLATELET 775 K/uL (Ref range: 150 - 400 K/uL); RBC 3.48 M/uL* (Ref range: 3.80 - 5.20 M/uL); WBC 6.4 K/uL (Ref range: 3.6 - 11.0 K/uL)   BMP: 6/28/2019: BUN 12 MG/DL (Ref range: 6 - 20 MG/DL); Calcium 9.2 MG/DL (Ref range: 8.5 - 10.1 MG/DL); Chloride 107 mmol/L (Ref range: 97 - 108 mmol/L); CO2 27 mmol/L (Ref range: 21 - 32 mmol/L); Creatinine 0.91 MG/DL (Ref range: 0.55 - 1.02 MG/DL); Glucose 92 mg/dL (Ref range: 65 - 100 mg/dL); Potassium 3.6 mmol/L (Ref range: 3.5 - 5.1 mmol/L); Sodium 140 mmol/L (Ref range: 136 - 145 mmol/L)  6/29/2019: BUN 14 MG/DL (Ref range: 6 - 20 MG/DL); Calcium 9.0 MG/DL (Ref range: 8.5 - 10.1 MG/DL); Chloride 109 mmol/L* (Ref range: 97 - 108 mmol/L); CO2 25 mmol/L (Ref range: 21 - 32 mmol/L); Creatinine 0.90 MG/DL (Ref range: 0.55 - 1.02 MG/DL); Glucose 99 mg/dL (Ref range: 65 - 100 mg/dL); Potassium 3.5 mmol/L (Ref range: 3.5 - 5.1 mmol/L); Sodium 140 mmol/L (Ref range: 136 - 145 mmol/L)  Coagulation: No results found for requested labs within first 48 hours of the last admission day. Liver: 6/28/2019: Albumin 3.3 g/dL* (Ref range: 3.5 - 5.0 g/dL); Alk. phosphatase 85 U/L (Ref range: 45 - 117 U/L); AST (SGOT) 16 U/L (Ref range: 15 - 37 U/L);  Protein, total 7.2 g/dL (Ref range: 6.4 - 8.2 g/dL)    Recent Results (from the past 24 hour(s))   POTASSIUM    Collection Time: 07/01/19  1:13 PM   Result Value Ref Range    Potassium 3.9 3.5 - 5.1 mmol/L   CBC WITH AUTOMATED DIFF    Collection Time: 07/02/19  3:50 AM   Result Value Ref Range    WBC 7.9 3.6 - 11.0 K/uL    RBC 3.86 3.80 - 5.20 M/uL HGB 8.3 (L) 11.5 - 16.0 g/dL    HCT 27.3 (L) 35.0 - 47.0 %    MCV 70.7 (L) 80.0 - 99.0 FL    MCH 21.5 (L) 26.0 - 34.0 PG    MCHC 30.4 30.0 - 36.5 g/dL    RDW 18.4 (H) 11.5 - 14.5 %    PLATELET 577 505 - 775 K/uL    MPV 9.5 8.9 - 12.9 FL    NRBC 0.0 0  WBC    ABSOLUTE NRBC 0.00 0.00 - 0.01 K/uL    NEUTROPHILS 67 32 - 75 %    LYMPHOCYTES 22 12 - 49 %    MONOCYTES 9 5 - 13 %    EOSINOPHILS 1 0 - 7 %    BASOPHILS 1 0 - 1 %    IMMATURE GRANULOCYTES 0 0.0 - 0.5 %    ABS. NEUTROPHILS 5.3 1.8 - 8.0 K/UL    ABS. LYMPHOCYTES 1.7 0.8 - 3.5 K/UL    ABS. MONOCYTES 0.7 0.0 - 1.0 K/UL    ABS. EOSINOPHILS 0.1 0.0 - 0.4 K/UL    ABS. BASOPHILS 0.0 0.0 - 0.1 K/UL    ABS. IMM. GRANS. 0.0 0.00 - 0.04 K/UL    DF AUTOMATED     METABOLIC PANEL, BASIC    Collection Time: 07/02/19  3:50 AM   Result Value Ref Range    Sodium 141 136 - 145 mmol/L    Potassium 3.6 3.5 - 5.1 mmol/L    Chloride 109 (H) 97 - 108 mmol/L    CO2 24 21 - 32 mmol/L    Anion gap 8 5 - 15 mmol/L    Glucose 92 65 - 100 mg/dL    BUN 6 6 - 20 MG/DL    Creatinine 0.84 0.55 - 1.02 MG/DL    BUN/Creatinine ratio 7 (L) 12 - 20      GFR est AA >60 >60 ml/min/1.73m2    GFR est non-AA >60 >60 ml/min/1.73m2    Calcium 8.4 (L) 8.5 - 10.1 MG/DL       History:  Past Medical History:   Diagnosis Date    Hypertension       Past Surgical History:   Procedure Laterality Date    COLONOSCOPY N/A 7/1/2019    COLONOSCOPY performed [facility]COPY    COLONOSCOPY,DIAGNOSTIC  7/1/2019         COLONOSCPY,FLEX,W/ [address] INJECT  7/1/2019         HX ORTHOPAEDIC      tia. hip replacement    UPPER GI ENDOSCOPY,BIOPSY  7/1/2019           Prior to Admission medications    Medication Sig Start Date End Date Taking? Authorizing Provider   nebivolol (BYSTOLIC) 5 mg tablet daily. Yes Other, MD Henrique   valsartan (DIOVAN) 160 mg tablet Take 160 mg by mouth daily.    Yes Other, MD Henrique   valsartan-hydroCHLOROthiazide (DIOVAN-HCT) 160-12.5 mg per tablet Take 1 Tab by mouth every other day. Yes Other, MD Henrique     Allergies   Allergen Reactions    Diclofenac Other (comments)     Forgot reaction    Hydrocodone Other (comments)     Forgot reaction    Lisinopril Cough    Oxycodone Other (comments)     Forgot reaction    Promethazine Other (comments)     Pt. Forgot the reaction    Tramadol Other (comments)     Forgot reaction      Social History     Tobacco Use    Smoking status: Never Smoker    Smokeless tobacco: Never Used   Substance Use Topics    Alcohol use: Never     Frequency: Never      History reviewed. No pertinent family history. Current Medications:  Current Facility-Administered Medications   Medication Dose Route Frequency    sodium chloride (NS) flush 5-40 mL  5-40 mL IntraVENous PRN    sodium chloride (NS) flush 5-40 mL  5-40 mL IntraVENous Q8H    0.9% sodium chloride infusion 250 mL  250 mL IntraVENous PRN    acetaminophen (TYLENOL) tablet 650 mg  650 mg Oral Q6H PRN    ondansetron (ZOFRAN) injection 4 mg  4 mg IntraVENous Q6H PRN    docusate sodium (COLACE) capsule 100 mg  100 mg Oral DAILY PRN    pantoprazole (PROTONIX) 40 mg in sodium chloride 0.9% 10 mL injection  40 mg IntraVENous Q12H    hydrALAZINE (APRESOLINE) 20 mg/mL injection 10 mg  10 mg IntraVENous Q6H PRN         ROS negative for 11 organ systems except as noted above. Physical Exam:  Constitutional Alert, cooperative, oriented. Mood and affect appropriate. Appears close to chronological age. Well nourished. Well developed. Head Normocephalic; no scars   Eyes Conjunctivae and sclerae are clear and without icterus. Pupils are round   ENMT Sinuses are nontender. No oral exudates, ulcers, masses, thrush or mucositis. Oropharynx clear. Tongue normal.   Neck Supple without masses or thyromegaly. No jugular venous distension. Hematologic/Lymphatic No petechiae or purpura. No tender or palpable lymph nodes in the cervical, supraclavicular, axillary regions.     Respiratory Lungs are clear to auscultation without rhonchi or wheezing. Cardiovascular Regular rate and rhythm of heart without murmurs, gallops or rubs. Chest / Line Site Chest is symmetric with no chest wall deformities. Abdomen Non-tender, non-distended, no masses, ascites or hepatosplenomegaly. Good bowel sounds. No guarding or rebound tenderness. No pulsatile masses. Musculoskeletal No tenderness or swelling, normal range of motion without obvious weakness. Extremities No visible deformities, no cyanosis, clubbing or edema. Skin No rashes, scars, or lesions suggestive of malignancy. No petechiae, purpura, or ecchymoses. No excoriations. Neurologic No sensory or motor deficits noted but not specifically tested. Psychiatric Alert and oriented. Coherent speech. Verbalizes understanding of our discussions today. Over an hour was spent today either in face to face counseling with patient and family or on floor coordinating care, reviewing Xrays, scans, and records.

## 2019-07-02 NOTE — PROGRESS NOTES
Bedside and Verbal shift change report given to Kylah (oncoming nurse) by Maumelle Daisy RN (offgoing nurse). Report included the following information Kardex, MAR and Recent Results.

## 2019-07-02 NOTE — PROGRESS NOTES
Problem: Falls - Risk of  Goal: *Absence of Falls  Description  Document Latonyaolm Constable Fall Risk and appropriate interventions in the flowsheet.   7/1/2019 2227 by Minnie Villalobos RN  Outcome: Progressing Towards Goal  7/1/2019 2048 by Minnie Villalobos RN  Outcome: Progressing Towards Goal

## 2019-07-02 NOTE — CONSULTS
Consult    Patient: Isis Miner MRN: [id number]  SSN: [id number]    YOB: 1932  Age: 80 y.o. Sex: female      Subjective:      Isis Miner is a 80 y.o. female who is being seen for a nearly obstructing right colon mass concerning for adenocarcinoma. Colonoscopy yesterday revealed this lesion after she was admitted for symptomatic anemia        Past Medical History:   Diagnosis Date    Hypertension      Past Surgical History:   Procedure Laterality Date    COLONOSCOPY N/A 7/1/2019    COLONOSCOPY performed by [facility]ula [facility]ancini MD at [facility] ENDOSCOPY    COLONOSCOPY,DIAGNOSTIC  7/1/2019         COLONOSCPY,FLEX,W/ [address] INJECT  7/1/2019         HX ORTHOPAEDIC      tia. hip replacement    UPPER GI ENDOSCOPY,BIOPSY  7/1/2019           History reviewed. No pertinent family history.   Social History     Tobacco Use    Smoking status: Never Smoker    Smokeless tobacco: Never Used   Substance Use Topics    Alcohol use: Never     Frequency: Never      Current Facility-Administered Medications   Medication Dose Route Frequency Provider Last Rate Last Dose    iron sucrose (VENOFER) 300 mg in 0.9% sodium chloride 250 mL IVPB  300 mg IntraVENous Q24H Ga Smiley MD        sodium chloride (NS) flush 5-40 mL  5-40 mL IntraVENous PRN Lula Mancini MD        sodium chloride (NS) flush 5-40 mL  5-40 mL IntraVENous Q8H Lawyer Luis MD   5 mL at 07/02/19 0510    0.9% sodium chloride infusion 250 mL  250 mL IntraVENous PRN Katya Galindo MD        acetaminophen (TYLENOL) tablet 650 mg  650 mg Oral Q6H PRN Cameron Cooper MD        ondansetron [facility]) injection 4 mg  4 mg IntraVENous Q6H PRN Cameron Cooper MD        docusate sodium (COLACE) capsule 100 mg  100 mg Oral DAILY PRN Cameron Cooper MD        pantoprazole (PROTONIX) 40 mg in sodium chloride 0.9% 10 mL injection  40 mg IntraVENous Q12H Cameron Cooper MD   40 mg at 07/02/19 0935    hydrALAZINE (APRESOLINE) 20 mg/mL injection 10 mg 10 mg IntraVENous Q6H PRN Lanette Herring MD   10 mg at 07/01/19 1150        Allergies   Allergen Reactions    Diclofenac Other (comments)     Forgot reaction    Hydrocodone Other (comments)     Forgot reaction    Lisinopril Cough    Oxycodone Other (comments)     Forgot reaction    Promethazine Other (comments)     Pt. Forgot the reaction    Tramadol Other (comments)     Forgot reaction       Review of Systems:  A comprehensive review of systems was negative except for that written in the History of Present Illness. Objective:     Vitals:    07/01/19 2225 07/02/19 0310 07/02/19 0719 07/02/19 1112   BP: 151/53 144/71 153/44 129/49   Pulse: 88 78 70 66   Resp: 16 16 16 18   Temp: 98.1 °F (36.7 °C) 98.1 °F (36.7 °C) 97.8 °F (36.6 °C) 97.9 °F (36.6 °C)   SpO2: 99% 95% 96% 99%   Weight:       Height:            Physical Exam:  General:  Alert, cooperative, no distress, appears stated age. Eyes:  Conjunctivae/corneas clear. PERRL, EOMs intact. Fundi benign   Ears:  Normal TMs and external ear canals both ears. Nose: Nares normal. Septum midline. Mucosa normal. No drainage or sinus tenderness. Mouth/Throat: Lips, mucosa, and tongue normal. Teeth and gums normal.   Neck: Supple, symmetrical, trachea midline, no adenopathy, thyroid: no enlargment/tenderness/nodules, no carotid bruit and no JVD. Back:   Symmetric, no curvature. ROM normal. No CVA tenderness. Lungs:   Clear to auscultation bilaterally. Heart:  Regular rate and rhythm, S1, S2 normal, no murmur, click, rub or gallop. Abdomen:   Soft, non-tender. Bowel sounds normal. No masses,  No organomegaly. Extremities: Extremities normal, atraumatic, no cyanosis or edema. Pulses: 2+ and symmetric all extremities. Skin: Skin color, texture, turgor normal. No rashes or lesions   Lymph nodes: Cervical, supraclavicular, and axillary nodes normal.   Neurologic: CNII-XII intact. Normal strength, sensation and reflexes throughout.          Assessment: Hospital Problems  Date Reviewed: 7/1/2019          Codes Class Noted POA    Anemia ICD-10-CM: D64.9  ICD-9-CM: 285.9  6/28/2019 Unknown              Plan:     Laparoscopic Right colectomy tomorrow afternoon around 2:30pm.  NPO after midnight.       Signed By: Jim Flores MD     July 2, 2019

## 2019-07-02 NOTE — PROGRESS NOTES
Problem: Falls - Risk of  Goal: *Absence of Falls  Description  Document Patsy Hayes Fall Risk and appropriate interventions in the flowsheet.   Outcome: Progressing Towards Goal

## 2019-07-02 NOTE — PROGRESS NOTES
Gastroenterology Progress Note    7/2/2019    Admit Date: 6/28/2019    Subjective: Follow up for:  1)  Anemia, secondary to #2    2) colon mass    3) duodenal polyp    EGD and Colon yesterday. Colon significant for mass just distal to ICV not obstructing but occupying 270 degrees, s/p jimmie ink and bx    EGD significant for [id number] polyp s/p bx    CT abd/pelvis with IV contrast last night: Right colon cancer mass is evident with small ileocolic region lymph  nodes which are not pathologically enlarged by CT criteria. There is no  identified typical metastatic disease though several small hepatic hypodensities  are incompletely characterized and while the marrow present cyst metastatic  deposits are not entirely excluded. Hgb stable at 8.3    Patient is on Clear Liquid. Current Facility-Administered Medications   Medication Dose Route Frequency    iron sucrose (VENOFER) 300 mg in 0.9% sodium chloride 250 mL IVPB  300 mg IntraVENous Q24H    sodium chloride (NS) flush 5-40 mL  5-40 mL IntraVENous PRN    sodium chloride (NS) flush 5-40 mL  5-40 mL IntraVENous Q8H    0.9% sodium chloride infusion 250 mL  250 mL IntraVENous PRN    acetaminophen (TYLENOL) tablet 650 mg  650 mg Oral Q6H PRN    ondansetron (ZOFRAN) injection 4 mg  4 mg IntraVENous Q6H PRN    docusate sodium (COLACE) capsule 100 mg  100 mg Oral DAILY PRN    pantoprazole (PROTONIX) 40 mg in sodium chloride 0.9% 10 mL injection  40 mg IntraVENous Q12H    hydrALAZINE (APRESOLINE) 20 mg/mL injection 10 mg  10 mg IntraVENous Q6H PRN        Objective:     Blood pressure 129/49, pulse 66, temperature 97.9 °F (36.6 °C), resp. rate 18, height 5' 4\" (1.626 m), weight 74.8 kg (165 lb), SpO2 99 %. 07/02 0701 - 07/02 1900  In: 650 [P.O.:650]  Out: -     06/30 1901 - 07/02 0700  In: 1800 [P.O.:1000; I.V.:800]  Out: -     EXAM:   Gen: WDWN, NAD  HEENT: NCAT, sclera anicteric  Lungs: CTA B  Heart: RRR  Abd: +BS, Soft, ND  Ext: no edema. Data Review    Recent Results (from the past 24 hour(s))   POTASSIUM    Collection Time: 07/01/19  1:13 PM   Result Value Ref Range    Potassium 3.9 3.5 - 5.1 mmol/L   CBC WITH AUTOMATED DIFF    Collection Time: 07/02/19  3:50 AM   Result Value Ref Range    WBC 7.9 3.6 - 11.0 K/uL    RBC 3.86 3.80 - 5.20 M/uL    HGB 8.3 (L) 11.5 - 16.0 g/dL    HCT 27.3 (L) 35.0 - 47.0 %    MCV 70.7 (L) 80.0 - 99.0 FL    MCH 21.5 (L) 26.0 - 34.0 PG    MCHC 30.4 30.0 - 36.5 g/dL    RDW 18.4 (H) 11.5 - 14.5 %    PLATELET 839 316 - 813 K/uL    MPV 9.5 8.9 - 12.9 FL    NRBC 0.0 0  WBC    ABSOLUTE NRBC 0.00 0.00 - 0.01 K/uL    NEUTROPHILS 67 32 - 75 %    LYMPHOCYTES 22 12 - 49 %    MONOCYTES 9 5 - 13 %    EOSINOPHILS 1 0 - 7 %    BASOPHILS 1 0 - 1 %    IMMATURE GRANULOCYTES 0 0.0 - 0.5 %    ABS. NEUTROPHILS 5.3 1.8 - 8.0 K/UL    ABS. LYMPHOCYTES 1.7 0.8 - 3.5 K/UL    ABS. MONOCYTES 0.7 0.0 - 1.0 K/UL    ABS. EOSINOPHILS 0.1 0.0 - 0.4 K/UL    ABS. BASOPHILS 0.0 0.0 - 0.1 K/UL    ABS. IMM.  GRANS. 0.0 0.00 - 0.04 K/UL    DF AUTOMATED     METABOLIC PANEL, BASIC    Collection Time: 07/02/19  3:50 AM   Result Value Ref Range    Sodium 141 136 - 145 mmol/L    Potassium 3.6 3.5 - 5.1 mmol/L    Chloride 109 (H) 97 - 108 mmol/L    CO2 24 21 - 32 mmol/L    Anion gap 8 5 - 15 mmol/L    Glucose 92 65 - 100 mg/dL    BUN 6 6 - 20 MG/DL    Creatinine 0.84 0.55 - 1.02 MG/DL    BUN/Creatinine ratio 7 (L) 12 - 20      GFR est AA >60 >60 ml/min/1.73m2    GFR est non-AA >60 >60 ml/min/1.73m2    Calcium 8.4 (L) 8.5 - 10.1 MG/DL     Recent Labs     07/02/19  0350 07/01/19  0420   WBC 7.9 6.3   HGB 8.3* 7.9*   HCT 27.3* 24.9*    292     Recent Labs     07/02/19  0350 07/01/19  1313 07/01/19  0420 06/30/19  0341     --  144 141   K 3.6 3.9 3.2* 3.6   *  --  110* 110*   CO2 24  --  27 25   BUN 6  --  8 12   CREA 0.84  --  0.88 0.99   GLU 92  --  87 94   CA 8.4*  --  8.4* 8.7     No results for input(s): SGOT, GPT, ALT, AP, TBIL, TBILI, TP, ALB, GLOB, GGT, AML, LPSE in the last 72 hours. No lab exists for component: AMYP, HLPSE  No results for input(s): INR, PTP, APTT in the last 72 hours. No lab exists for component: INREXT   No results for input(s): FE, TIBC, PSAT, FERR in the last 72 hours. No results found for: FOL, RBCF   No results for input(s): PH, PCO2, PO2 in the last 72 hours. No results for input(s): CPK, CKNDX, TROIQ in the last 72 hours. No lab exists for component: CPKMB  No results found for: CHOL, CHOLX, CHLST, CHOLV, HDL, LDL, LDLC, DLDLP, TGLX, TRIGL, TRIGP, CHHD, CHHDX  No results found for: [facility]  Lab Results   Component Value Date/Time    Color YELLOW/STRAW 06/28/2019 07:35 PM    Appearance CLEAR 06/28/2019 07:35 PM    Specific gravity 1.010 06/28/2019 07:35 PM    pH (UA) 6.5 06/28/2019 07:35 PM    Protein NEGATIVE  06/28/2019 07:35 PM    Glucose NEGATIVE  06/28/2019 07:35 PM    Ketone NEGATIVE  06/28/2019 07:35 PM    Bilirubin NEGATIVE  06/28/2019 07:35 PM    Urobilinogen 0.2 06/28/2019 07:35 PM    Nitrites NEGATIVE  06/28/2019 07:35 PM    Leukocyte Esterase MODERATE (A) 06/28/2019 07:35 PM    Epithelial cells FEW 06/28/2019 07:35 PM    Bacteria NEGATIVE  06/28/2019 07:35 PM    WBC 10-20 06/28/2019 07:35 PM    RBC 0-5 06/28/2019 07:35 PM           Assessment:     Active Problems:    Anemia (6/28/2019)    explained by colon cancer    Plan:     Patient was seen by Dr. Elias Parsons, oncology and Dr. Inocente Ma, [facility].. She is scheduled for colon mass resection tomorrow. CT scan did not show any obvious metastatic disease. Will need EUS to better evaluate duodenal polyp prior to endoscopic removal but patient isn't sure she wants this done. Follow path.       AIDAN Fisher  07/02/19  11:32 AM

## 2019-07-02 NOTE — PROGRESS NOTES
Surgery NP SOAP Note    Subjective:  Family and patient report they are prepared for her to have surgery tomorrow. She would like to know about diet before and after surgery. Objective:  Patient sitting in chair, NAD. Family at bedside. In good spirits. Asks appropriate questions. Assessment:  Patient with new diagnosis colon CA. Dr. Jacquie Simon discussed with patient her treatment options and she has agreed to move forward with surgical resection. Plan:  NPO after MN, verify consent, OR tomorrow for right colectomy. Patient to transfer to surg tele post-op.      Rosa Maria Khoury NP

## 2019-07-02 NOTE — PROGRESS NOTES
Bedside and Verbal shift change report given to Hannah Heart (oncoming nurse) by Jaida King (offgoing nurse). Report included the following information SBAR, Kardex, MAR and Recent Results.

## 2019-07-02 NOTE — PROGRESS NOTES
Hospitalist Progress Note    NAME: Isis White   :  1932   MRN:  [id number]       Assessment / Plan:    Acute Blood Loss Anemia POA  -baseline Hgb in th 10-11 range, hb 8 today  -positive FOBT and dark stool at home  -s/p EGD showing Duodenal Polyp and Colonoscopy today showing Colonic Mass  -continue IV PPI  -CRS consulted, planned for laparoscopic right colectomy tomorrow  -appreciate oncology consult     Hypertension with Hypertensive Urgency on presentation   Sinus Bradycardia: resolved  -Holding Bystolic for now    Hypokalemia:  -replete and monitor     25.0 - 29.9 Overweight / Body mass index is 28.32 kg/m².     Code status: DNR  Prophylaxis: SCD's  Recommended Disposition: Home w/Family     Subjective:     Chief Complaint / Reason for Physician Visit: follow-up weakness, anemia  Patient seen for follow-up  'I am doing ok, Just waiting to hear from oncologist\"    Review of Systems:  Symptom Y/N Comments  Symptom Y/N Comments   Fever/Chills    Chest Pain n    Poor Appetite    Edema     Cough    Abdominal Pain n    Sputum    Joint Pain     SOB/MCPHERSON n resolved  Pruritis/Rash     Nausea/vomit  No vomiting  Tolerating PT/OT     Diarrhea    Tolerating Diet     Constipation    Other       Could NOT obtain due to:      Objective:     VITALS:   Last 24hrs VS reviewed since prior progress note.  Most recent are:  Patient Vitals for the past 24 hrs:   Temp Pulse Resp BP SpO2   19 1112 97.9 °F (36.6 °C) 66 18 129/49 99 %   19 0719 97.8 °F (36.6 °C) 70 16 153/44 96 %   19 0310 98.1 °F (36.7 °C) 78 16 144/71 95 %   19 2225 98.1 °F (36.7 °C) 88 16 151/53 99 %   19 2026 98 °F (36.7 °C) 80 18 188/56 98 %   19 1627 97.8 °F (36.6 °C) 75 16 167/67 100 %   19 1605  77 16 152/64 97 %   19 1600  76 21 (!) 150/95 99 %   19 1555  75 21 149/68 99 %   19 1550  76 20 149/60 100 %   19 1545  80 20 133/66 100 %   19 1542  84 21 153/62 100 %   19 1426 98 °F (36.7 °C) 72 16 159/70 99 %   07/01/19 1324    158/54    07/01/19 1317    161/64        Intake/Output Summary (Last 24 hours) at 7/2/2019 1229  Last data filed at 7/2/2019 0900  Gross per 24 hour   Intake 1450 ml   Output    Net 1450 ml        PHYSICAL EXAM:  General: Alert, cooperative, no acute distress    EENT:  EOMI. Anicteric sclerae. MM dry  Resp:  CTA bilaterally, no wheezing or rales. No accessory muscle use  CV:  Regular  rhythm,  No edema  GI:  Soft, Non distended, Non tender.  +Bowel sounds  Neurologic:  Alert and oriented X 3, normal speech, cn 2-12 grossly intact  Psych:   Good insight. Not anxious nor agitated  Skin:  No rashes, still Pale. No jaundice    Reviewed most current lab test results and cultures  YES  Reviewed most current radiology test results   YES  Review and summation of old records today    NO  Reviewed patient's current orders and MAR    YES  PMH/ reviewed - no change compared to H&P  ________________________________________________________________________  Care Plan discussed with:    Comments   Patient x    Family      RN x    Care Manager     Consultant                        Multidiciplinary team rounds were held today with , nursing, pharmacist and clinical coordinator. Patient's plan of care was discussed; medications were reviewed and discharge planning was addressed. ________________________________________________________________________  Total NON critical care TIME: 30   Minutes    Total CRITICAL CARE TIME Spent:   Minutes non procedure based      Comments   >50% of visit spent in counseling and coordination of care x    ________________________________________________________________________  Angela Sy MD     Procedures: see electronic medical records for all procedures/Xrays and details which were not copied into this note but were reviewed prior to creation of Plan.       LABS:  I reviewed today's most current labs and imaging studies.   Pertinent labs include:  Recent Labs     07/02/19  0350 07/01/19  0420 06/30/19  0341   WBC 7.9 6.3 6.9   HGB 8.3* 7.9* 7.0*   HCT 27.3* 24.9* 23.8*    292 338     Recent Labs     07/02/19  0350 07/01/19  1313 07/01/19  0420 06/30/19  0341     --  144 141   K 3.6 3.9 3.2* 3.6   *  --  110* 110*   CO2 24  --  27 25   GLU 92  --  87 94   BUN 6  --  8 12   CREA 0.84  --  0.88 0.99   CA 8.4*  --  8.4* 8.7       Signed: Lavon Torrez MD

## 2019-07-03 ENCOUNTER — ANESTHESIA (OUTPATIENT)
Dept: SURGERY | Age: 84
DRG: 330 | End: 2019-07-03
Payer: MEDICARE

## 2019-07-03 PROCEDURE — 76010000131 HC OR TIME 2 TO 2.5 HR: Performed by: SURGERY

## 2019-07-03 PROCEDURE — 77030034850: Performed by: SURGERY

## 2019-07-03 PROCEDURE — 74011250636 HC RX REV CODE- 250/636: Performed by: ANESTHESIOLOGY

## 2019-07-03 PROCEDURE — 74011250636 HC RX REV CODE- 250/636: Performed by: SURGERY

## 2019-07-03 PROCEDURE — 74011000250 HC RX REV CODE- 250: Performed by: INTERNAL MEDICINE

## 2019-07-03 PROCEDURE — 77030035220 HC TRCR ENDOSC BLNTPRT ANCHR COVD -B: Performed by: SURGERY

## 2019-07-03 PROCEDURE — 94760 N-INVAS EAR/PLS OXIMETRY 1: CPT

## 2019-07-03 PROCEDURE — 77030009979 HC RELD STPLR TCR J&J -C: Performed by: SURGERY

## 2019-07-03 PROCEDURE — 74011000254 HC RX REV CODE- 254

## 2019-07-03 PROCEDURE — 77030032490 HC SLV COMPR SCD KNE COVD -B: Performed by: SURGERY

## 2019-07-03 PROCEDURE — 77030003029 HC SUT VCRL J&J -B: Performed by: SURGERY

## 2019-07-03 PROCEDURE — 74011250636 HC RX REV CODE- 250/636

## 2019-07-03 PROCEDURE — 77030002966 HC SUT PDS J&J -A: Performed by: SURGERY

## 2019-07-03 PROCEDURE — 77030031139 HC SUT VCRL2 J&J -A: Performed by: SURGERY

## 2019-07-03 PROCEDURE — 77030018684: Performed by: SURGERY

## 2019-07-03 PROCEDURE — 77030011640 HC PAD GRND REM COVD -A: Performed by: SURGERY

## 2019-07-03 PROCEDURE — C9113 INJ PANTOPRAZOLE SODIUM, VIA: HCPCS | Performed by: INTERNAL MEDICINE

## 2019-07-03 PROCEDURE — 77030019908 HC STETH ESOPH SIMS -A: Performed by: ANESTHESIOLOGY

## 2019-07-03 PROCEDURE — 77030013079 HC BLNKT BAIR HGGR 3M -A: Performed by: ANESTHESIOLOGY

## 2019-07-03 PROCEDURE — 77030008684 HC TU ET CUF COVD -B: Performed by: ANESTHESIOLOGY

## 2019-07-03 PROCEDURE — 74011250636 HC RX REV CODE- 250/636: Performed by: INTERNAL MEDICINE

## 2019-07-03 PROCEDURE — 77030008771 HC TU NG SALEM SUMP -A: Performed by: ANESTHESIOLOGY

## 2019-07-03 PROCEDURE — C9113 INJ PANTOPRAZOLE SODIUM, VIA: HCPCS | Performed by: SURGERY

## 2019-07-03 PROCEDURE — 88360 TUMOR IMMUNOHISTOCHEM/MANUAL: CPT

## 2019-07-03 PROCEDURE — 74011000250 HC RX REV CODE- 250

## 2019-07-03 PROCEDURE — 77030011808 HC STPLR ENDOSCOPIC J&J -D: Performed by: SURGERY

## 2019-07-03 PROCEDURE — 65270000029 HC RM PRIVATE

## 2019-07-03 PROCEDURE — 76210000016 HC OR PH I REC 1 TO 1.5 HR: Performed by: SURGERY

## 2019-07-03 PROCEDURE — 88309 TISSUE EXAM BY PATHOLOGIST: CPT

## 2019-07-03 PROCEDURE — 77030009527 HC GEL PRT SYS AMR -E: Performed by: SURGERY

## 2019-07-03 PROCEDURE — 77030016151 HC PROTCTR LNS DFOG COVD -B: Performed by: SURGERY

## 2019-07-03 PROCEDURE — 77030039266 HC ADH SKN EXOFIN S2SG -A: Performed by: SURGERY

## 2019-07-03 PROCEDURE — 77030008606 HC TRCR ENDOSC KII AMR -B: Performed by: SURGERY

## 2019-07-03 PROCEDURE — 77030026438 HC STYL ET INTUB CARD -A: Performed by: ANESTHESIOLOGY

## 2019-07-03 PROCEDURE — 77030018673: Performed by: SURGERY

## 2019-07-03 PROCEDURE — 74011000250 HC RX REV CODE- 250: Performed by: SURGERY

## 2019-07-03 PROCEDURE — 0DTF4ZZ RESECTION OF RIGHT LARGE INTESTINE, PERCUTANEOUS ENDOSCOPIC APPROACH: ICD-10-PCS | Performed by: SURGERY

## 2019-07-03 PROCEDURE — 77030020782 HC GWN BAIR PAWS FLX 3M -B

## 2019-07-03 PROCEDURE — 77030002933 HC SUT MCRYL J&J -A: Performed by: SURGERY

## 2019-07-03 PROCEDURE — 77030034628 HC LIGASURE LAP SEAL DIV MD COVD -F: Performed by: SURGERY

## 2019-07-03 PROCEDURE — 77030036731 HC STPLR ENDOSC J&J -F: Performed by: SURGERY

## 2019-07-03 PROCEDURE — 74011250637 HC RX REV CODE- 250/637: Performed by: SURGERY

## 2019-07-03 PROCEDURE — 76060000035 HC ANESTHESIA 2 TO 2.5 HR: Performed by: SURGERY

## 2019-07-03 PROCEDURE — 77030018836 HC SOL IRR NACL ICUM -A: Performed by: SURGERY

## 2019-07-03 RX ORDER — INDOCYANINE GREEN AND WATER 25 MG
KIT INJECTION AS NEEDED
Status: DISCONTINUED | OUTPATIENT
Start: 2019-07-03 | End: 2019-07-03 | Stop reason: HOSPADM

## 2019-07-03 RX ORDER — PROPOFOL 10 MG/ML
INJECTION, EMULSION INTRAVENOUS AS NEEDED
Status: DISCONTINUED | OUTPATIENT
Start: 2019-07-03 | End: 2019-07-03 | Stop reason: HOSPADM

## 2019-07-03 RX ORDER — SODIUM CHLORIDE, SODIUM LACTATE, POTASSIUM CHLORIDE, CALCIUM CHLORIDE 600; 310; 30; 20 MG/100ML; MG/100ML; MG/100ML; MG/100ML
75 INJECTION, SOLUTION INTRAVENOUS CONTINUOUS
Status: DISPENSED | OUTPATIENT
Start: 2019-07-03 | End: 2019-07-04

## 2019-07-03 RX ORDER — SODIUM CHLORIDE 0.9 % (FLUSH) 0.9 %
5-40 SYRINGE (ML) INJECTION EVERY 8 HOURS
Status: DISCONTINUED | OUTPATIENT
Start: 2019-07-03 | End: 2019-07-03 | Stop reason: HOSPADM

## 2019-07-03 RX ORDER — ONDANSETRON 4 MG/1
4 TABLET, ORALLY DISINTEGRATING ORAL
Status: DISCONTINUED | OUTPATIENT
Start: 2019-07-03 | End: 2019-07-09 | Stop reason: HOSPADM

## 2019-07-03 RX ORDER — ACETAMINOPHEN 10 MG/ML
INJECTION, SOLUTION INTRAVENOUS AS NEEDED
Status: DISCONTINUED | OUTPATIENT
Start: 2019-07-03 | End: 2019-07-03 | Stop reason: HOSPADM

## 2019-07-03 RX ORDER — DEXAMETHASONE SODIUM PHOSPHATE 4 MG/ML
INJECTION, SOLUTION INTRA-ARTICULAR; INTRALESIONAL; INTRAMUSCULAR; INTRAVENOUS; SOFT TISSUE AS NEEDED
Status: DISCONTINUED | OUTPATIENT
Start: 2019-07-03 | End: 2019-07-03 | Stop reason: HOSPADM

## 2019-07-03 RX ORDER — HYDROMORPHONE HYDROCHLORIDE 1 MG/ML
0.2 INJECTION, SOLUTION INTRAMUSCULAR; INTRAVENOUS; SUBCUTANEOUS
Status: DISCONTINUED | OUTPATIENT
Start: 2019-07-03 | End: 2019-07-03 | Stop reason: HOSPADM

## 2019-07-03 RX ORDER — LIDOCAINE HYDROCHLORIDE ANHYDROUS AND DEXTROSE MONOHYDRATE .8; 5 G/100ML; G/100ML
INJECTION, SOLUTION INTRAVENOUS
Status: DISCONTINUED | OUTPATIENT
Start: 2019-07-03 | End: 2019-07-03 | Stop reason: HOSPADM

## 2019-07-03 RX ORDER — ONDANSETRON 2 MG/ML
INJECTION INTRAMUSCULAR; INTRAVENOUS AS NEEDED
Status: DISCONTINUED | OUTPATIENT
Start: 2019-07-03 | End: 2019-07-03 | Stop reason: HOSPADM

## 2019-07-03 RX ORDER — BUPIVACAINE HYDROCHLORIDE AND EPINEPHRINE 2.5; 5 MG/ML; UG/ML
30 INJECTION, SOLUTION EPIDURAL; INFILTRATION; INTRACAUDAL; PERINEURAL ONCE
Status: COMPLETED | OUTPATIENT
Start: 2019-07-03 | End: 2019-07-03

## 2019-07-03 RX ORDER — SODIUM CHLORIDE 0.9 % (FLUSH) 0.9 %
5-40 SYRINGE (ML) INJECTION AS NEEDED
Status: DISCONTINUED | OUTPATIENT
Start: 2019-07-03 | End: 2019-07-03 | Stop reason: HOSPADM

## 2019-07-03 RX ORDER — ONDANSETRON 2 MG/ML
4 INJECTION INTRAMUSCULAR; INTRAVENOUS AS NEEDED
Status: DISCONTINUED | OUTPATIENT
Start: 2019-07-03 | End: 2019-07-03 | Stop reason: HOSPADM

## 2019-07-03 RX ORDER — LIDOCAINE HYDROCHLORIDE ANHYDROUS AND DEXTROSE MONOHYDRATE .8; 5 G/100ML; G/100ML
INJECTION, SOLUTION INTRAVENOUS
Status: DISCONTINUED | OUTPATIENT
Start: 2019-07-03 | End: 2019-07-03

## 2019-07-03 RX ORDER — ENOXAPARIN SODIUM 100 MG/ML
40 INJECTION SUBCUTANEOUS EVERY 24 HOURS
Status: DISCONTINUED | OUTPATIENT
Start: 2019-07-04 | End: 2019-07-09 | Stop reason: HOSPADM

## 2019-07-03 RX ORDER — LIDOCAINE HYDROCHLORIDE ANHYDROUS AND DEXTROSE MONOHYDRATE .8; 5 G/100ML; G/100ML
1 INJECTION, SOLUTION INTRAVENOUS CONTINUOUS
Status: ACTIVE | OUTPATIENT
Start: 2019-07-03 | End: 2019-07-05

## 2019-07-03 RX ORDER — PHENYLEPHRINE HCL IN 0.9% NACL 0.4MG/10ML
SYRINGE (ML) INTRAVENOUS AS NEEDED
Status: DISCONTINUED | OUTPATIENT
Start: 2019-07-03 | End: 2019-07-03 | Stop reason: HOSPADM

## 2019-07-03 RX ORDER — FENTANYL CITRATE 50 UG/ML
25 INJECTION, SOLUTION INTRAMUSCULAR; INTRAVENOUS
Status: DISCONTINUED | OUTPATIENT
Start: 2019-07-03 | End: 2019-07-03 | Stop reason: HOSPADM

## 2019-07-03 RX ORDER — DIPHENHYDRAMINE HYDROCHLORIDE 50 MG/ML
12.5 INJECTION, SOLUTION INTRAMUSCULAR; INTRAVENOUS AS NEEDED
Status: DISCONTINUED | OUTPATIENT
Start: 2019-07-03 | End: 2019-07-03 | Stop reason: HOSPADM

## 2019-07-03 RX ORDER — MIDAZOLAM HYDROCHLORIDE 1 MG/ML
1 INJECTION, SOLUTION INTRAMUSCULAR; INTRAVENOUS AS NEEDED
Status: DISCONTINUED | OUTPATIENT
Start: 2019-07-03 | End: 2019-07-03 | Stop reason: HOSPADM

## 2019-07-03 RX ORDER — PROPOFOL 10 MG/ML
INJECTION, EMULSION INTRAVENOUS
Status: DISCONTINUED | OUTPATIENT
Start: 2019-07-03 | End: 2019-07-03 | Stop reason: HOSPADM

## 2019-07-03 RX ORDER — FENTANYL CITRATE 50 UG/ML
INJECTION, SOLUTION INTRAMUSCULAR; INTRAVENOUS AS NEEDED
Status: DISCONTINUED | OUTPATIENT
Start: 2019-07-03 | End: 2019-07-03 | Stop reason: HOSPADM

## 2019-07-03 RX ORDER — MAGNESIUM SULFATE HEPTAHYDRATE 40 MG/ML
INJECTION, SOLUTION INTRAVENOUS AS NEEDED
Status: DISCONTINUED | OUTPATIENT
Start: 2019-07-03 | End: 2019-07-03 | Stop reason: HOSPADM

## 2019-07-03 RX ORDER — LIDOCAINE HYDROCHLORIDE 20 MG/ML
INJECTION, SOLUTION EPIDURAL; INFILTRATION; INTRACAUDAL; PERINEURAL AS NEEDED
Status: DISCONTINUED | OUTPATIENT
Start: 2019-07-03 | End: 2019-07-03 | Stop reason: HOSPADM

## 2019-07-03 RX ORDER — SUCCINYLCHOLINE CHLORIDE 20 MG/ML
INJECTION INTRAMUSCULAR; INTRAVENOUS AS NEEDED
Status: DISCONTINUED | OUTPATIENT
Start: 2019-07-03 | End: 2019-07-03 | Stop reason: HOSPADM

## 2019-07-03 RX ORDER — NALOXONE HYDROCHLORIDE 0.4 MG/ML
0.4 INJECTION, SOLUTION INTRAMUSCULAR; INTRAVENOUS; SUBCUTANEOUS AS NEEDED
Status: DISCONTINUED | OUTPATIENT
Start: 2019-07-03 | End: 2019-07-09 | Stop reason: HOSPADM

## 2019-07-03 RX ORDER — ACETAMINOPHEN 500 MG
1000 TABLET ORAL EVERY 6 HOURS
Status: DISCONTINUED | OUTPATIENT
Start: 2019-07-03 | End: 2019-07-09 | Stop reason: HOSPADM

## 2019-07-03 RX ORDER — SODIUM CHLORIDE, SODIUM LACTATE, POTASSIUM CHLORIDE, CALCIUM CHLORIDE 600; 310; 30; 20 MG/100ML; MG/100ML; MG/100ML; MG/100ML
INJECTION, SOLUTION INTRAVENOUS
Status: DISCONTINUED | OUTPATIENT
Start: 2019-07-03 | End: 2019-07-03 | Stop reason: HOSPADM

## 2019-07-03 RX ORDER — LIDOCAINE HYDROCHLORIDE 10 MG/ML
0.1 INJECTION, SOLUTION EPIDURAL; INFILTRATION; INTRACAUDAL; PERINEURAL AS NEEDED
Status: DISCONTINUED | OUTPATIENT
Start: 2019-07-03 | End: 2019-07-03 | Stop reason: HOSPADM

## 2019-07-03 RX ORDER — SODIUM CHLORIDE 9 MG/ML
250 INJECTION, SOLUTION INTRAVENOUS AS NEEDED
Status: DISCONTINUED | OUTPATIENT
Start: 2019-07-03 | End: 2019-07-03

## 2019-07-03 RX ORDER — ALVIMOPAN 12 MG/1
12 CAPSULE ORAL 2 TIMES DAILY
Status: DISCONTINUED | OUTPATIENT
Start: 2019-07-04 | End: 2019-07-07

## 2019-07-03 RX ORDER — ROCURONIUM BROMIDE 10 MG/ML
INJECTION, SOLUTION INTRAVENOUS AS NEEDED
Status: DISCONTINUED | OUTPATIENT
Start: 2019-07-03 | End: 2019-07-03 | Stop reason: HOSPADM

## 2019-07-03 RX ORDER — SODIUM CHLORIDE, SODIUM LACTATE, POTASSIUM CHLORIDE, CALCIUM CHLORIDE 600; 310; 30; 20 MG/100ML; MG/100ML; MG/100ML; MG/100ML
25 INJECTION, SOLUTION INTRAVENOUS CONTINUOUS
Status: DISCONTINUED | OUTPATIENT
Start: 2019-07-03 | End: 2019-07-03 | Stop reason: HOSPADM

## 2019-07-03 RX ADMIN — PROPOFOL 120 MG: 10 INJECTION, EMULSION INTRAVENOUS at 14:58

## 2019-07-03 RX ADMIN — Medication 10 ML: at 13:14

## 2019-07-03 RX ADMIN — ACETAMINOPHEN 1000 MG: 10 INJECTION, SOLUTION INTRAVENOUS at 16:30

## 2019-07-03 RX ADMIN — SODIUM CHLORIDE 40 MG: 9 INJECTION, SOLUTION INTRAMUSCULAR; INTRAVENOUS; SUBCUTANEOUS at 08:42

## 2019-07-03 RX ADMIN — IRON SUCROSE 300 MG: 20 INJECTION, SOLUTION INTRAVENOUS at 13:14

## 2019-07-03 RX ADMIN — FENTANYL CITRATE 50 MCG: 50 INJECTION, SOLUTION INTRAMUSCULAR; INTRAVENOUS at 14:58

## 2019-07-03 RX ADMIN — PROPOFOL 30 MG: 10 INJECTION, EMULSION INTRAVENOUS at 16:35

## 2019-07-03 RX ADMIN — SODIUM CHLORIDE, SODIUM LACTATE, POTASSIUM CHLORIDE, CALCIUM CHLORIDE: 600; 310; 30; 20 INJECTION, SOLUTION INTRAVENOUS at 14:15

## 2019-07-03 RX ADMIN — LIDOCAINE HYDROCHLORIDE 80 MG: 20 INJECTION, SOLUTION EPIDURAL; INFILTRATION; INTRACAUDAL; PERINEURAL at 14:58

## 2019-07-03 RX ADMIN — LIDOCAINE HYDROCHLORIDE ANHYDROUS AND DEXTROSE MONOHYDRATE 1 MG/KG/HR: .8; 5 INJECTION, SOLUTION INTRAVENOUS at 17:45

## 2019-07-03 RX ADMIN — LIDOCAINE HYDROCHLORIDE ANHYDROUS AND DEXTROSE MONOHYDRATE 2 MG/KG/HR: .8; 5 INJECTION, SOLUTION INTRAVENOUS at 15:00

## 2019-07-03 RX ADMIN — ROCURONIUM BROMIDE 10 MG: 10 INJECTION, SOLUTION INTRAVENOUS at 14:58

## 2019-07-03 RX ADMIN — MEPERIDINE HYDROCHLORIDE 12.5 MG: 25 INJECTION, SOLUTION INTRAMUSCULAR; INTRAVENOUS; SUBCUTANEOUS at 18:00

## 2019-07-03 RX ADMIN — SODIUM CHLORIDE, SODIUM LACTATE, POTASSIUM CHLORIDE, AND CALCIUM CHLORIDE 75 ML/HR: 600; 310; 30; 20 INJECTION, SOLUTION INTRAVENOUS at 22:33

## 2019-07-03 RX ADMIN — FENTANYL CITRATE 50 MCG: 50 INJECTION, SOLUTION INTRAMUSCULAR; INTRAVENOUS at 15:06

## 2019-07-03 RX ADMIN — Medication 80 MCG: at 15:24

## 2019-07-03 RX ADMIN — Medication 5 ML: at 05:49

## 2019-07-03 RX ADMIN — PROPOFOL 100 MCG/KG/MIN: 10 INJECTION, EMULSION INTRAVENOUS at 15:00

## 2019-07-03 RX ADMIN — INDOCYANINE GREEN AND WATER 5 MG: KIT at 15:58

## 2019-07-03 RX ADMIN — ACETAMINOPHEN 1000 MG: 500 TABLET ORAL at 22:31

## 2019-07-03 RX ADMIN — PROPOFOL 50 MG: 10 INJECTION, EMULSION INTRAVENOUS at 15:08

## 2019-07-03 RX ADMIN — ONDANSETRON 4 MG: 2 INJECTION INTRAMUSCULAR; INTRAVENOUS at 18:00

## 2019-07-03 RX ADMIN — Medication 10 ML: at 17:00

## 2019-07-03 RX ADMIN — ONDANSETRON 4 MG: 2 INJECTION INTRAMUSCULAR; INTRAVENOUS at 16:25

## 2019-07-03 RX ADMIN — PROPOFOL 175 MCG/KG/MIN: 10 INJECTION, EMULSION INTRAVENOUS at 15:40

## 2019-07-03 RX ADMIN — SODIUM CHLORIDE, SODIUM LACTATE, POTASSIUM CHLORIDE, AND CALCIUM CHLORIDE 25 ML/HR: 600; 310; 30; 20 INJECTION, SOLUTION INTRAVENOUS at 17:00

## 2019-07-03 RX ADMIN — SUCCINYLCHOLINE CHLORIDE 140 MG: 20 INJECTION INTRAMUSCULAR; INTRAVENOUS at 14:58

## 2019-07-03 RX ADMIN — LIDOCAINE HYDROCHLORIDE ANHYDROUS AND DEXTROSE MONOHYDRATE 2 MG/KG/HR: .8; 5 INJECTION, SOLUTION INTRAVENOUS at 15:55

## 2019-07-03 RX ADMIN — ROCURONIUM BROMIDE 20 MG: 10 INJECTION, SOLUTION INTRAVENOUS at 15:21

## 2019-07-03 RX ADMIN — MAGNESIUM SULFATE HEPTAHYDRATE 2 G: 40 INJECTION, SOLUTION INTRAVENOUS at 15:07

## 2019-07-03 RX ADMIN — LIDOCAINE HYDROCHLORIDE ANHYDROUS AND DEXTROSE MONOHYDRATE 0.5 MG/KG/HR: .8; 5 INJECTION, SOLUTION INTRAVENOUS at 17:20

## 2019-07-03 RX ADMIN — SODIUM CHLORIDE 40 MG: 9 INJECTION, SOLUTION INTRAMUSCULAR; INTRAVENOUS; SUBCUTANEOUS at 22:31

## 2019-07-03 RX ADMIN — DEXAMETHASONE SODIUM PHOSPHATE 8 MG: 4 INJECTION, SOLUTION INTRA-ARTICULAR; INTRALESIONAL; INTRAMUSCULAR; INTRAVENOUS; SOFT TISSUE at 15:25

## 2019-07-03 RX ADMIN — SODIUM CHLORIDE, SODIUM LACTATE, POTASSIUM CHLORIDE, CALCIUM CHLORIDE: 600; 310; 30; 20 INJECTION, SOLUTION INTRAVENOUS at 15:03

## 2019-07-03 RX ADMIN — ROCURONIUM BROMIDE 20 MG: 10 INJECTION, SOLUTION INTRAVENOUS at 15:08

## 2019-07-03 RX ADMIN — PROPOFOL 125 MCG/KG/MIN: 10 INJECTION, EMULSION INTRAVENOUS at 16:08

## 2019-07-03 NOTE — PROGRESS NOTES
TRANSFER - OUT REPORT:    Verbal report given to Sandhya Ayala on June Roberta Amparo  being transferred to Surgical Telemetry Unit for routine post - op       Report consisted of patients Situation, Background, Assessment and   Recommendations(SBAR). Information from the following report(s) SBAR, Kardex, Procedure Summary, Intake/Output, MAR, Accordion and Cardiac Rhythm sinus rhythm was reviewed with the receiving nurse. Lines:   Peripheral IV 07/03/19 Right;Posterior Hand (Active)   Site Assessment Clean, dry, & intact 7/3/2019  6:15 PM   Phlebitis Assessment 0 7/3/2019  6:15 PM   Infiltration Assessment 0 7/3/2019  6:15 PM   Dressing Status Clean, dry, & intact 7/3/2019  6:15 PM   Dressing Type Transparent;Tape 7/3/2019  6:15 PM   Hub Color/Line Status Pink;Capped;Flushed;Patent 7/3/2019  6:15 PM   Alcohol Cap Used Yes 7/3/2019  5:00 PM       Peripheral IV 07/03/19 Left;Posterior Hand (Active)   Site Assessment Clean, dry, & intact 7/3/2019  6:15 PM   Phlebitis Assessment 0 7/3/2019  6:15 PM   Infiltration Assessment 0 7/3/2019  6:15 PM   Dressing Status Clean, dry, & intact 7/3/2019  6:15 PM   Dressing Type Transparent;Tape 7/3/2019  6:15 PM   Hub Color/Line Status Pink;Capped;Flushed;Patent; Infusing 7/3/2019  6:15 PM   Alcohol Cap Used Yes 7/3/2019  5:00 PM        Opportunity for questions and clarification was provided.       Patient transported with:   Monitor  O2 @ 2 liters  Registered Nurse   Patient's chart

## 2019-07-03 NOTE — PROGRESS NOTES
Bedside and Verbal shift change report given to Kylah (oncoming nurse) by [address] [place] [address] Road Ne RN (offgoing nurse). Report included the following information Kardex, MAR and Recent Results.

## 2019-07-03 NOTE — PROGRESS NOTES
Patient came to floor [phone]. This RN took vitals and did dual skin assessment. Gave report to TASCET (oncoming RN) from [employer] (outgoing RN) using BESSY Jeffrey and MAR.

## 2019-07-03 NOTE — PERIOP NOTES
TRANSFER - IN REPORT:    Verbal report received from Kylah MORRELL on June Margarett Rosette  being received from 21 [phone] for ordered procedure      Report consisted of patients Situation, Background, Assessment and   Recommendations(SBAR). Information from the following report(s) SBAR, ED Summary, Procedure Summary, Intake/Output and MAR was reviewed with the receiving nurse. Opportunity for questions and clarification was provided. Assessment completed upon patients arrival to unit and care assumed.

## 2019-07-03 NOTE — BRIEF OP NOTE
BRIEF OPERATIVE NOTE    Date of Procedure: 7/3/2019   Preoperative Diagnosis: MASS  Postoperative Diagnosis: COLON MASS    Procedure(s):  COLON RESECTION LAPAROSCOPIC RIGHT  Surgeon(s) and Role:     * Gregor Moreno MD - Primary         Surgical Assistant: Stuart Love    Surgical Staff:  Circ-1: Ramin Villalobos RN  Scrub Tech-1: Rona CORREA  Scrub Tech-Relief: Tata Kamara  Surg Asst-1: Romel Laser A  Event Time In Time Out   Incision Start 1525    Incision Close 1645      Anesthesia: General   Estimated Blood Loss: 20ml  Specimens:   ID Type Source Tests Collected by Time Destination   1 : RIGHT COLON Preservative Colon, Ascending  Nate Vargas MD 7/3/2019 1606 Pathology      Findings: Mass in the right colon   Complications: None  Implants: * No implants in log *

## 2019-07-03 NOTE — PROGRESS NOTES
Hospitalist Progress Note    NAME: Isis Han   :  1932   MRN:  [id number]       Assessment / Plan:    Acute Blood Loss Anemia POA  -baseline Hgb in th 10-11 range, hb ~ 8  -positive FOBT and dark stool at home  -s/p EGD showing Duodenal Polyp and Colonoscopy today showing Colonic Mass  -continue IV PPI  -CRS consulted, planned for laparoscopic right colectomy today  -appreciate oncology consult     Hypertension with Hypertensive Urgency on presentation   Sinus Bradycardia: resolved  -Holding Bystolic for now    Hypokalemia:  -replete and monitor     25.0 - 29.9 Overweight / Body mass index is 28.32 kg/m².     Code status: DNR  Prophylaxis: SCD's  Recommended Disposition: Home w/Family     Subjective:     Chief Complaint / Reason for Physician Visit: follow-up weakness, anemia  Patient seen for follow-up  'I am doing ok, little anxious for surgery though had good night sleept\"    Review of Systems:  Symptom Y/N Comments  Symptom Y/N Comments   Fever/Chills    Chest Pain n    Poor Appetite    Edema     Cough    Abdominal Pain n    Sputum    Joint Pain     SOB/MCPHERSON n resolved  Pruritis/Rash     Nausea/vomit  No vomiting  Tolerating PT/OT     Diarrhea    Tolerating Diet     Constipation    Other       Could NOT obtain due to:      Objective:     VITALS:   Last 24hrs VS reviewed since prior progress note. Most recent are:  Patient Vitals for the past 24 hrs:   Temp Pulse Resp BP SpO2   19 0811 98.4 °F (36.9 °C) 73 17 178/57 96 %   19 0215 97.9 °F (36.6 °C) 80 16 123/67 96 %   19 2245 98.2 °F (36.8 °C) 75 16 135/57 97 %   19 1916 98 °F (36.7 °C) 65 16 141/75 98 %   19 1510 97.9 °F (36.6 °C) 65 16 151/46 99 %       Intake/Output Summary (Last 24 hours) at 7/3/2019 1116  Last data filed at 7/3/2019 0839  Gross per 24 hour   Intake 0 ml   Output 0 ml   Net 0 ml        PHYSICAL EXAM:  General: Alert, cooperative, no acute distress    EENT:  EOMI. Anicteric sclerae.  MM dry  Resp:  CTA bilaterally, no wheezing or rales. No accessory muscle use  CV:  Regular  rhythm,  No edema  GI:  Soft, Non distended, Non tender.  +Bowel sounds  Neurologic:  Alert and oriented X 3, normal speech, cn 2-12 grossly intact  Psych:   Good insight. Not anxious nor agitated  Skin:  No rashes, still Pale. No jaundice    Reviewed most current lab test results and cultures  YES  Reviewed most current radiology test results   YES  Review and summation of old records today    NO  Reviewed patient's current orders and MAR    YES  PMH/SH reviewed - no change compared to H&P  ________________________________________________________________________  Care Plan discussed with:    Comments   Patient x    Family      RN x    Care Manager     Consultant                        Multidiciplinary team rounds were held today with , nursing, pharmacist and clinical coordinator. Patient's plan of care was discussed; medications were reviewed and discharge planning was addressed. ________________________________________________________________________  Total NON critical care TIME: 30   Minutes    Total CRITICAL CARE TIME Spent:   Minutes non procedure based      Comments   >50% of visit spent in counseling and coordination of care x    ________________________________________________________________________  Rizwan Castro MD     Procedures: see electronic medical records for all procedures/Xrays and details which were not copied into this note but were reviewed prior to creation of Plan. LABS:  I reviewed today's most current labs and imaging studies.   Pertinent labs include:  Recent Labs     07/02/19  0350 07/01/19  0420   WBC 7.9 6.3   HGB 8.3* 7.9*   HCT 27.3* 24.9*    292     Recent Labs     07/02/19  0350 07/01/19  1313 07/01/19  0420     --  144   K 3.6 3.9 3.2*   *  --  110*   CO2 24  --  27   GLU 92  --  87   BUN 6  --  8   CREA 0.84  --  0.88   CA 8.4*  --  8.4*       Signed: Rizwan Castro, MD

## 2019-07-03 NOTE — ANESTHESIA POSTPROCEDURE EVALUATION
Procedure(s):  COLON RESECTION LAPAROSCOPIC RIGHT. general    Anesthesia Post Evaluation        Patient location during evaluation: PACU  Note status: Adequate. Level of consciousness: responsive to verbal stimuli and sleepy but conscious  Pain management: satisfactory to patient  Airway patency: patent  Anesthetic complications: no  Cardiovascular status: acceptable  Respiratory status: acceptable  Hydration status: acceptable  Comments: +Post-Anesthesia Evaluation and Assessment    Patient: Isis Jean MRN: [id number]  SSN: [id number]   YOB: 1932  Age: 80 y.o. Sex: female      Cardiovascular Function/Vital Signs    /62   Pulse 66   Temp 36.4 °C (97.5 °F)   Resp 20   Ht 5' 4\" (1.626 m)   Wt 74.8 kg (165 lb)   SpO2 95%   BMI 28.32 kg/m²     Patient is status post Procedure(s):  COLON RESECTION LAPAROSCOPIC RIGHT. Nausea/Vomiting: Controlled. Postoperative hydration reviewed and adequate. Pain:  Pain Scale 1: FLACC (07/03/19 1815)  Pain Intensity 1: 0 (07/03/19 1815)   Managed. Neurological Status:   Neuro (WDL): Exceptions to WDL (07/03/19 1815)   At baseline. Mental Status and Level of Consciousness: Arousable. Pulmonary Status:   O2 Device: Nasal cannula (07/03/19 1815)   Adequate oxygenation and airway patent. Complications related to anesthesia: None    Post-anesthesia assessment completed. No concerns.     Signed By: Debbie Landis MD    7/3/2019  Post anesthesia nausea and vomiting:  controlled      Vitals Value Taken Time   /62 7/3/2019  6:47 PM   Temp 36.4 °C (97.5 °F) 7/3/2019  6:47 PM   Pulse 66 7/3/2019  6:47 PM   Resp 20 7/3/2019  6:47 PM   SpO2 95 % 7/3/2019  6:47 PM

## 2019-07-03 NOTE — PROGRESS NOTES
Problem: Falls - Risk of  Goal: *Absence of Falls  Description  Document Hillary Ontiveros Fall Risk and appropriate interventions in the flowsheet.   Outcome: Progressing Towards Goal

## 2019-07-03 NOTE — ANESTHESIA PREPROCEDURE EVALUATION
Relevant Problems   No relevant active problems       Anesthetic History     PONV          Review of Systems / Medical History  Patient summary reviewed, nursing notes reviewed and pertinent labs reviewed    Pulmonary  Within defined limits                 Neuro/Psych   Within defined limits           Cardiovascular    Hypertension: well controlled              Exercise tolerance: <4 METS     GI/Hepatic/Renal  Within defined limits              Endo/Other        Arthritis and anemia     Other Findings   Comments: Colon mass    Hgb 8.3, transfused on 6/30           Physical Exam    Airway  Mallampati: II  TM Distance: 4 - 6 cm  Neck ROM: normal range of motion   Mouth opening: Normal     Cardiovascular    Rhythm: regular  Rate: normal         Dental    Dentition: Caps/crowns, Upper dentition intact and Lower dentition intact     Pulmonary  Breath sounds clear to auscultation               Abdominal  GI exam deferred       Other Findings            Anesthetic Plan    ASA: 3  Anesthesia type: general    Monitoring Plan: BIS      Induction: Intravenous  Anesthetic plan and risks discussed with: Patient and Son / Daughter      Beta blocker has been discontinued

## 2019-07-03 NOTE — PROGRESS NOTES
Gastroenterology Progress Note  Taina Esparzama Covering for Dr. Angela Romero    7/3/2019    Admit Date: 6/28/2019    Subjective: Follow up for:  1)  Anemia, secondary to #2    2) colon mass    3) duodenal polyp    Patient is feeling well today, no pain. Reports looser stools she feels are secondary to CLD that she was previously on, no melena or hematochezia. No N/V. Right colectomy planned for today with Dr. Francisco Cheney given mass seen on colonoscopy on 7/1 distal to ICV, pt is ready and on board. Path pending. Hgb stable at 8.3 on 7/2. Not repeated today    Patient is NPO for surgery. Current Facility-Administered Medications   Medication Dose Route Frequency    iron sucrose (VENOFER) 300 mg in 0.9% sodium chloride 250 mL IVPB  300 mg IntraVENous Q24H    sodium chloride (NS) flush 5-40 mL  5-40 mL IntraVENous PRN    sodium chloride (NS) flush 5-40 mL  5-40 mL IntraVENous Q8H    0.9% sodium chloride infusion 250 mL  250 mL IntraVENous PRN    acetaminophen (TYLENOL) tablet 650 mg  650 mg Oral Q6H PRN    ondansetron (ZOFRAN) injection 4 mg  4 mg IntraVENous Q6H PRN    docusate sodium (COLACE) capsule 100 mg  100 mg Oral DAILY PRN    pantoprazole (PROTONIX) 40 mg in sodium chloride 0.9% 10 mL injection  40 mg IntraVENous Q12H    hydrALAZINE (APRESOLINE) 20 mg/mL injection 10 mg  10 mg IntraVENous Q6H PRN        Objective:     Blood pressure 178/57, pulse 73, temperature 98.4 °F (36.9 °C), resp. rate 17, height 5' 4\" (1.626 m), weight 74.8 kg (165 lb), SpO2 96 %. No intake/output data recorded. 07/01 1901 - 07/03 0700  In: 0 [P.O.:650]  Out: -     EXAM:   Gen: Pleasant WF, NAD  HEENT: NCAT, sclera anicteric  Lungs: CTA B, no wheezes or rales  Heart: RRR, no M/R/G  Abd: Soft, NT, ND, normal BS  Ext: no edema.      Data Review    Recent Results (from the past 24 hour(s))   FERRITIN    Collection Time: 07/02/19  1:34 PM   Result Value Ref Range    Ferritin 106 8 - 252 NG/ML   CEA Collection Time: 07/02/19  1:34 PM   Result Value Ref Range    CEA 1.6 ng/mL   SAMPLES BEING HELD    Collection Time: 07/02/19  1:34 PM   Result Value Ref Range    SAMPLES BEING HELD 1PST 1GOLD     COMMENT        Add-on orders for these samples will be processed based on acceptable specimen integrity and analyte stability, which may vary by analyte. Recent Labs     07/02/19  0350 07/01/19  0420   WBC 7.9 6.3   HGB 8.3* 7.9*   HCT 27.3* 24.9*    292     Recent Labs     07/02/19  0350 07/01/19  1313 07/01/19  0420     --  144   K 3.6 3.9 3.2*   *  --  110*   CO2 24  --  27   BUN 6  --  8   CREA 0.84  --  0.88   GLU 92  --  87   CA 8.4*  --  8.4*     No results for input(s): SGOT, GPT, ALT, AP, TBIL, TBILI, TP, ALB, GLOB, GGT, AML, LPSE in the last 72 hours. No lab exists for component: AMYP, HLPSE  No results for input(s): INR, PTP, APTT in the last 72 hours. No lab exists for component: INREXT, INREXT   Recent Labs     07/02/19  1334   FERR 106      No results found for: FOL, RBCF   No results for input(s): PH, PCO2, PO2 in the last 72 hours. No results for input(s): CPK, CKNDX, TROIQ in the last 72 hours.     No lab exists for component: CPKMB  No results found for: CHOL, CHOLX, CHLST, CHOLV, HDL, LDL, LDLC, DLDLP, TGLX, TRIGL, TRIGP, CHHD, CHHDX  No results found for: [facility]  Lab Results   Component Value Date/Time    Color YELLOW/STRAW 06/28/2019 07:35 PM    Appearance CLEAR 06/28/2019 07:35 PM    Specific gravity 1.010 06/28/2019 07:35 PM    pH (UA) 6.5 06/28/2019 07:35 PM    Protein NEGATIVE  06/28/2019 07:35 PM    Glucose NEGATIVE  06/28/2019 07:35 PM    Ketone NEGATIVE  06/28/2019 07:35 PM    Bilirubin NEGATIVE  06/28/2019 07:35 PM    Urobilinogen 0.2 06/28/2019 07:35 PM    Nitrites NEGATIVE  06/28/2019 07:35 PM    Leukocyte Esterase MODERATE (A) 06/28/2019 07:35 PM    Epithelial cells FEW 06/28/2019 07:35 PM    Bacteria NEGATIVE  06/28/2019 07:35 PM    WBC 10-20 06/28/2019 07:35 PM    RBC 0-5 06/28/2019 07:35 PM     CT Results (most recent):  Results from Hospital Encounter encounter on 06/28/19   CT ABD PELV W CONT    Narrative EXAM: CT ABD PELV W CONT    INDICATION: right sided colon cancer    COMPARISON: Chest x-ray 6/20/2019. CONTRAST: 100 mL of Isovue-370. TECHNIQUE:   Following the uneventful intravenous administration of contrast, thin axial  images were obtained through the abdomen and pelvis. Coronal and sagittal  reconstructions were generated. Oral contrast administered. CT dose reduction  was achieved through use of a standardized protocol tailored for this  examination and automatic exposure control for dose modulation. FINDINGS:   LUNG BASES: Clear. INCIDENTALLY IMAGED HEART AND MEDIASTINUM: No markable. Coronary artery  calcifications noted. LIVER: Subcentimeter hypodense lesions which are incompletely characterized. No  biliary ductal dilation. GALLBLADDER: Unremarkable. SPLEEN: No mass. PANCREAS: No mass or ductal dilatation. ADRENALS: Unremarkable. KIDNEYS: No mass, calculus, or hydronephrosis. STOMACH: Unremarkable. SMALL BOWEL: No dilatation or wall thickening. COLON: Circumferential wall thickening compatible with known colon cancer at the  right colon just distal to the ileocecal valve. No dilation or wall thickening  otherwise. APPENDIX: Not seen. No secondary signs of appendicitis. PERITONEUM: No ascites or pneumoperitoneum. Small lymph nodes in the ileocolic  region are not pathologically enlarged by CT criteria (series 2, image 46). RETROPERITONEUM: Atherosclerotic calcination without aneurysm or dissection. No  enlarged lymphadenopathy. REPRODUCTIVE ORGANS: Partially obscured by streak artifact. Uterus and ovaries  appear unremarkable. URINARY BLADDER: Partially obscured by streak artifact. No abnormalities  identified. BONES: Degenerative spine change.  Bilateral hip arthroplasty prostheses generate  streak artifact which obscures adjacent tissue. Degenerative spine change with  grade 1 anterolisthesis of L4 on L5 with multilevel degenerative disc change and  vacuum phenomenon. No aggressive lesion or acute fracture. ADDITIONAL COMMENTS: N/A      Impression IMPRESSION: Right colon cancer mass is evident with small ileocolic region lymph  nodes which are not pathologically enlarged by CT criteria. There is no  identified typical metastatic disease though several small hepatic hypodensities  are incompletely characterized and while the marrow present cyst metastatic  deposits are not entirely excluded. Assessment:     Active Problems:    Anemia (6/28/2019)    Secondary to suspected colon cancer    Duodenal polyp    Plan:     Proceed with colon mass resection today with right colectomy with Dr. Jared Dunaway. No evidence of metastatic disease on CT, Dr. Thiago Mckinney on the case. Path pending from colon bx as well as duodenal polyp bx. Pending path, if adenoma would recommend EUS prior to resection.      AIDAN Roman  07/03/19  11:32 AM

## 2019-07-03 NOTE — PERIOP NOTES
Handoff Report from Operating Room to PACU    Report received from Debi Bill RN and [place] CRNA regarding Isis Valadez. Surgeon(s):  Codi Montelongo MD  And Procedure(s) (LRB):  COLON RESECTION LAPAROSCOPIC RIGHT (N/A)  confirmed with allergies, drains and dressings discussed. Anesthesia type, drugs, patient history, complications, estimated blood loss, vital signs, intake and output, and last pain medication, lines, reversal medications and temperature were reviewed.

## 2019-07-03 NOTE — PROGRESS NOTES
Physical Therapy  Patient scheduled for R colectomy today. Will defer therapy today. Please re-consult PT following surgery if appropriate.   Thank you,  Juan A Simms, PT

## 2019-07-03 NOTE — PROGRESS NOTES
Hematology Oncology Progress Note       Follow up for: colon cancer  Chart notes reviewed since last visit. Case discussed with following: daughter, . Patient complains of the following: no complaints. In good spirits. Additional concerns noted by the staff:     Patient Vitals for the past 24 hrs:   BP Temp Pulse Resp SpO2   07/03/19 1118 153/49 97.6 °F (36.4 °C) (!) 59 19 99 %   07/03/19 0811 178/57 98.4 °F (36.9 °C) 73 17 96 %   07/03/19 0215 123/67 97.9 °F (36.6 °C) 80 16 96 %   07/02/19 2245 135/57 98.2 °F (36.8 °C) 75 16 97 %   07/02/19 1916 141/75 98 °F (36.7 °C) 65 16 98 %   07/02/19 1510 151/46 97.9 °F (36.6 °C) 65 16 99 %       ROS: negative for 11 organ systems except as noted. Physical Examination:  Constitutional Alert, cooperative, oriented. Mood and affect appropriate. Appears close to chronological age. Well nourished. Well developed. Head Normocephalic; no scars   Eyes Conjunctivae and sclerae are clear and without icterus. Pupils are round   ENMT Sinuses are nontender. No oral exudates, ulcers, masses, thrush or mucositis. Oropharynx clear. Tongue normal.   Neck Supple without masses or thyromegaly. No jugular venous distension. Hematologic/Lymphatic No petechiae or purpura. No tender or palpable lymph nodes in the cervical, supraclavicular, axillary regions. Respiratory Lungs are clear to auscultation without rhonchi or wheezing. Cardiovascular Regular rate and rhythm of heart without murmurs, gallops or rubs. Chest / Line Site Chest is symmetric with no chest wall deformities. Abdomen Non-tender, non-distended, no masses, ascites or hepatosplenomegaly. Good bowel sounds. Musculoskeletal No tenderness or swelling, normal range of motion without obvious weakness. Extremities No visible deformities, no cyanosis, clubbing or edema. Skin No rashes, scars, or lesions suggestive of malignancy. No petechiae, purpura, or ecchymoses. No excoriations.     Neurologic No sensory or motor deficits noted but not specifically tested. Psychiatric Alert and oriented. Coherent speech. Verbalizes understanding of our discussions today. Labs:  No results found for this or any previous visit (from the past 24 hour(s)). Assessment and Plan:   Colon cancer - for surgery today. Will followup on path when available. Emotional support given today and questions answered re surgery and followup.

## 2019-07-03 NOTE — PROGRESS NOTES
ALICIA:  -Pt lives with dtr//SHERIF and has for years. She works parttime and SHERIF fulltime  -has a disabled 51yo son in [place] in [place]  -they want either [facility] or SNF at d/c  -SHERIF works at [address] as   -ensure therapy is reordered after surgery. CM consult received to try to make the pt's new PCP blaine't earlier than August.  Per the CM specialist:  [facility](Dr Black) entire practice is booked out until August. They would have to be seen at a different practice to be seen earlier.  There is a practice in [facility] that can see them

## 2019-07-03 NOTE — PERIOP NOTES
Update provided to the patient's son-in-law, Adrian Mehta. Concerns were expressed that the patient does not currently have a scopolamine patch on post op. This nurse informed the patient's son-in-law that the patient is not currently nauseous and that she may have a scopolamine patch if it becomes necessary at a later time. No other questions or concerns were expressed at the time of the update.

## 2019-07-04 PROBLEM — Z90.49 S/P RIGHT COLECTOMY: Status: ACTIVE | Noted: 2019-07-04

## 2019-07-04 LAB
ABO + RH BLD: NORMAL
ANION GAP SERPL CALC-SCNC: 8 MMOL/L (ref 5–15)
BLD PROD TYP BPU: NORMAL
BLOOD GROUP ANTIBODIES SERPL: NORMAL
BPU ID: NORMAL
BUN SERPL-MCNC: 8 MG/DL (ref 6–20)
BUN/CREAT SERPL: 8 (ref 12–20)
CALCIUM SERPL-MCNC: 8.5 MG/DL (ref 8.5–10.1)
CHLORIDE SERPL-SCNC: 109 MMOL/L (ref 97–108)
CO2 SERPL-SCNC: 23 MMOL/L (ref 21–32)
CREAT SERPL-MCNC: 0.98 MG/DL (ref 0.55–1.02)
CROSSMATCH RESULT,%XM: NORMAL
ERYTHROCYTE [DISTWIDTH] IN BLOOD BY AUTOMATED COUNT: 20.2 % (ref 11.5–14.5)
GLUCOSE SERPL-MCNC: 134 MG/DL (ref 65–100)
HCT VFR BLD AUTO: 27 % (ref 35–47)
HGB BLD-MCNC: 8.5 G/DL (ref 11.5–16)
MAGNESIUM SERPL-MCNC: 2.4 MG/DL (ref 1.6–2.4)
MCH RBC QN AUTO: 22.6 PG (ref 26–34)
MCHC RBC AUTO-ENTMCNC: 31.5 G/DL (ref 30–36.5)
MCV RBC AUTO: 71.8 FL (ref 80–99)
NRBC # BLD: 0 K/UL (ref 0–0.01)
NRBC BLD-RTO: 0 PER 100 WBC
PHOSPHATE SERPL-MCNC: 4.5 MG/DL (ref 2.6–4.7)
PLATELET # BLD AUTO: 246 K/UL (ref 150–400)
PMV BLD AUTO: 9.8 FL (ref 8.9–12.9)
POTASSIUM SERPL-SCNC: 3.9 MMOL/L (ref 3.5–5.1)
RBC # BLD AUTO: 3.76 M/UL (ref 3.8–5.2)
SODIUM SERPL-SCNC: 140 MMOL/L (ref 136–145)
SPECIMEN EXP DATE BLD: NORMAL
STATUS OF UNIT,%ST: NORMAL
UNIT DIVISION, %UDIV: 0
WBC # BLD AUTO: 15 K/UL (ref 3.6–11)

## 2019-07-04 PROCEDURE — 74011250636 HC RX REV CODE- 250/636: Performed by: SURGERY

## 2019-07-04 PROCEDURE — 80048 BASIC METABOLIC PNL TOTAL CA: CPT

## 2019-07-04 PROCEDURE — C9113 INJ PANTOPRAZOLE SODIUM, VIA: HCPCS | Performed by: SURGERY

## 2019-07-04 PROCEDURE — 97535 SELF CARE MNGMENT TRAINING: CPT

## 2019-07-04 PROCEDURE — 97530 THERAPEUTIC ACTIVITIES: CPT

## 2019-07-04 PROCEDURE — 84100 ASSAY OF PHOSPHORUS: CPT

## 2019-07-04 PROCEDURE — 74011250637 HC RX REV CODE- 250/637: Performed by: SURGERY

## 2019-07-04 PROCEDURE — 65270000029 HC RM PRIVATE

## 2019-07-04 PROCEDURE — 36415 COLL VENOUS BLD VENIPUNCTURE: CPT

## 2019-07-04 PROCEDURE — 97165 OT EVAL LOW COMPLEX 30 MIN: CPT

## 2019-07-04 PROCEDURE — 97116 GAIT TRAINING THERAPY: CPT

## 2019-07-04 PROCEDURE — 74011000250 HC RX REV CODE- 250: Performed by: SURGERY

## 2019-07-04 PROCEDURE — 83735 ASSAY OF MAGNESIUM: CPT

## 2019-07-04 PROCEDURE — 94760 N-INVAS EAR/PLS OXIMETRY 1: CPT

## 2019-07-04 PROCEDURE — 77010033678 HC OXYGEN DAILY

## 2019-07-04 PROCEDURE — 85027 COMPLETE CBC AUTOMATED: CPT

## 2019-07-04 RX ORDER — TRAMADOL HYDROCHLORIDE 50 MG/1
50 TABLET ORAL
Status: DISCONTINUED | OUTPATIENT
Start: 2019-07-04 | End: 2019-07-04

## 2019-07-04 RX ORDER — TRAMADOL HYDROCHLORIDE 50 MG/1
25 TABLET ORAL
Status: DISCONTINUED | OUTPATIENT
Start: 2019-07-04 | End: 2019-07-09 | Stop reason: HOSPADM

## 2019-07-04 RX ADMIN — ACETAMINOPHEN 1000 MG: 500 TABLET ORAL at 09:03

## 2019-07-04 RX ADMIN — SODIUM CHLORIDE 40 MG: 9 INJECTION, SOLUTION INTRAMUSCULAR; INTRAVENOUS; SUBCUTANEOUS at 09:03

## 2019-07-04 RX ADMIN — ACETAMINOPHEN 1000 MG: 500 TABLET ORAL at 22:37

## 2019-07-04 RX ADMIN — ALVIMOPAN 12 MG: 12 CAPSULE ORAL at 09:03

## 2019-07-04 RX ADMIN — ENOXAPARIN SODIUM 40 MG: 40 INJECTION SUBCUTANEOUS at 10:04

## 2019-07-04 RX ADMIN — SODIUM CHLORIDE, SODIUM LACTATE, POTASSIUM CHLORIDE, AND CALCIUM CHLORIDE 75 ML/HR: 600; 310; 30; 20 INJECTION, SOLUTION INTRAVENOUS at 16:01

## 2019-07-04 RX ADMIN — SODIUM CHLORIDE 40 MG: 9 INJECTION, SOLUTION INTRAMUSCULAR; INTRAVENOUS; SUBCUTANEOUS at 20:15

## 2019-07-04 RX ADMIN — ACETAMINOPHEN 1000 MG: 500 TABLET ORAL at 13:56

## 2019-07-04 RX ADMIN — ACETAMINOPHEN 1000 MG: 500 TABLET ORAL at 03:23

## 2019-07-04 RX ADMIN — ALVIMOPAN 12 MG: 12 CAPSULE ORAL at 18:19

## 2019-07-04 NOTE — PROGRESS NOTES
Hospitalist Progress Note    NAME: Isis Wyman   :  1932   MRN:  [id number]       Assessment / Plan:    Acute Blood Loss Anemia POA  Colon Mass  Leukocytosis, likely postsurgical stress reaction, monitor  -baseline Hgb in th 10-11 range, hb stable ~ 8  -positive FOBT and dark stool at home  -s/p EGD showing Duodenal Polyp and Colonoscopy today showing Colonic Mass  -continue IV PPI  -CRS consulted, S/P laparoscopic right colectomy 7/3, pod # 1  -appreciate oncology consult     Hypertension with Hypertensive Urgency on presentation   Sinus Bradycardia: resolved  -Holding Bystolic for now    Hypokalemia:  -replete and monitor     Overweight   Body mass index is 28.32 kg/m².     Code status: DNR  Prophylaxis: SCD's  Recommended Disposition: Home w/Family     Subjective:     Chief Complaint / Reason for Physician Visit: follow-up weakness, anemia  Patient seen for follow-up  'I am doing ok, my better hurts little bit when I cough\"    Review of Systems:  Symptom Y/N Comments  Symptom Y/N Comments   Fever/Chills    Chest Pain n    Poor Appetite    Edema     Cough    Abdominal Pain n    Sputum    Joint Pain     SOB/MCPHERSON n resolved  Pruritis/Rash     Nausea/vomit  No vomiting  Tolerating PT/OT     Diarrhea    Tolerating Diet     Constipation    Other       Could NOT obtain due to:      Objective:     VITALS:   Last 24hrs VS reviewed since prior progress note.  Most recent are:  Patient Vitals for the past 24 hrs:   Temp Pulse Resp BP SpO2   19 1300  69  (!) 140/36 95 %   19 1249 98.3 °F (36.8 °C) 66 18 (!) 134/32 96 %   19 0744 98.6 °F (37 °C) 70 18 139/60 96 %   19 0323 97.5 °F (36.4 °C) 69 18 172/87 94 %   19 2235 97.8 °F (36.6 °C) 74 18 166/88 95 %   19 2128 97.7 °F (36.5 °C) 69 18 174/68 96 %   19 2000 97.5 °F (36.4 °C) 68 18 178/71 96 %   19 1847 97.5 °F (36.4 °C) 66 20 155/62 95 %   19 1815 98 °F (36.7 °C) 69 17 160/48 96 %   19 1800  67 18 171/60 99 %   07/03/19 1745  67  172/60 97 %   07/03/19 1730  67 17 170/56 99 %   07/03/19 1715  67 18 160/55 98 %   07/03/19 1710  68 18 157/54 98 %   07/03/19 1709 98 °F (36.7 °C) 69 12 162/49 96 %   07/03/19 1705  68 15 162/49 96 %   07/03/19 1700 98 °F (36.7 °C) 70 17 134/57 98 %   07/03/19 1659    144/50        Intake/Output Summary (Last 24 hours) at 7/4/2019 1511  Last data filed at 7/4/2019 1006  Gross per 24 hour   Intake 906.35 ml   Output 620 ml   Net 286.35 ml        PHYSICAL EXAM:  General: Alert, cooperative, no acute distress    EENT:  EOMI. Anicteric sclerae. MM dry  Resp:  CTA bilaterally, no wheezing or rales. No accessory muscle use  CV:  Regular  rhythm,  No edema  GI:  Soft, Non distended, Non tender.  +Bowel sounds  Neurologic:  Alert and oriented X 3, normal speech, cn 2-12 grossly intact  Psych:   Good insight. Not anxious nor agitated  Skin:  No rashes, still Pale. No jaundice    Reviewed most current lab test results and cultures  YES  Reviewed most current radiology test results   YES  Review and summation of old records today    NO  Reviewed patient's current orders and MAR    YES  PMH/SH reviewed - no change compared to H&P  ________________________________________________________________________  Care Plan discussed with:    Comments   Patient x    Family      RN x    Care Manager     Consultant                        Multidiciplinary team rounds were held today with , nursing, pharmacist and clinical coordinator. Patient's plan of care was discussed; medications were reviewed and discharge planning was addressed.      ________________________________________________________________________  Total NON critical care TIME: 30   Minutes    Total CRITICAL CARE TIME Spent:   Minutes non procedure based      Comments   >50% of visit spent in counseling and coordination of care x    ________________________________________________________________________  Esperanza Expose, MD Procedures: see electronic medical records for all procedures/Xrays and details which were not copied into this note but were reviewed prior to creation of Plan. LABS:  I reviewed today's most current labs and imaging studies.   Pertinent labs include:  Recent Labs     07/04/19 0407 07/02/19 0350   WBC 15.0* 7.9   HGB 8.5* 8.3*   HCT 27.0* 27.3*    277     Recent Labs     07/04/19 0407 07/02/19  0350    141   K 3.9 3.6   * 109*   CO2 23 24   * 92   BUN 8 6   CREA 0.98 0.84   CA 8.5 8.4*   MG 2.4  --    PHOS 4.5  --        Signed: Mihir Savage MD

## 2019-07-04 NOTE — PROGRESS NOTES
D/c to 1400   Patient /32 Dr Dylan cain. General Surgery End of Shift Nursing Note    Bedside shift change report given to [address],Gerard (oncoming nurse) by Jane Dumas (offgoing nurse). Report included the following information SBAR and Kardex. Shift worked:   7a-7p   Summary of shift:    Patient was up to chair for all meals. To taken out and patient has voided 100mL. Patient ambulated to door and back, to bathroom and back. Patient wanted more than tylenol for pain, talked to Dr Dylan Lindsay, Tramadol was ordered. Issues for physician to address:   See above     Number times ambulated in hallway past shift: 0    Number of times OOB to chair past shift: 3    Pain Management:  Current medication: see mar  Patient states pain is manageable on current pain medication: YES    GI:    Current diet:  DIET REGULAR Low Fiber  DIET NUTRITIONAL SUPPLEMENTS Breakfast, Lunch; Ensure [place] current diet: YES  Passing flatus: YES  Last Bowel Movement: several days ago   Appearance: brown    Respiratory:    Incentive Spirometer at bedside: YES  Patient instructed on use: YES    Patient Safety:    Falls Score: 1  Bed Alarm On? No  Sitter?  No    Sylvia Yepez

## 2019-07-04 NOTE — OP NOTES
[address]  OPERATIVE REPORT    Name:  Yovani Moreno  MR#:  [id number]  :  1932  ACCOUNT #:  [de-identified]  DATE OF SERVICE:  2019      PREOPERATIVE DIAGNOSIS:  Mass in the right colon. POSTOPERATIVE DIAGNOSIS:  Mass in the right colon. PROCEDURE PERFORMED:  Laparoscopic right colectomy. SURGEON:  Cynthia George MD.    SURGICAL ASSISTANT:  Jose Pickens. ANESTHESIA:  General.    COMPLICATIONS:  None. SPECIMENS REMOVED:  Right colon. IMPLANTS:  None. ESTIMATED BLOOD LOSS:  20 mL. FINDINGS:  Mass of the right colon. INDICATIONS:  This is an 66-year-old female who presented to the hospital with symptomatic anemia. She underwent a colonoscopy and was found to have a large mass in the right colon near the ileocecal valve. This was biopsied and found to be adenocarcinoma. She underwent preoperative staging CT, which showed no evidence of metastatic disease. CEA was normal.  I explained to her and her family all the risks and benefits of surgery including, but not limited to bleeding, infection, anastomotic leak, damage to surrounding structures, and need for an ostomy. She understood all the risks and elected to proceed. DESCRIPTION OF THE PROCEDURE:  The patient was brought to the operating suite. She was placed in the supine position. General endotracheal anesthesia was induced. Venodyne stockings were placed as well as a Sylvester catheter. She was then placed in the low-lithotomy position. Her abdomen was prepped and draped in the usual sterile fashion. A time-out was performed indicating the correct patient and procedure to be performed as per the hospital protocol and the patient received the appropriate preoperative antibiotics. A 12-mm vertical midline incision was made above the umbilicus. The incision was taken down to the level of the fascia. The fascia was elevated and incised.   The Federico trocar was placed in the abdominal cavity and the abdomen was insufflated. I then placed a hand port in the right paramedian incision in a vertical fashion, spreading the muscle. A 5-mm trocar was placed in the left lower quadrant. It was through the three incisions that the entirety of the procedure was completed. The patient was placed in a slight Trendelenburg position with the right side up, left side down. There was noted to be a mild amount of adhesions, which were taken down of the abdominal wall, which allowed me to free up the greater omentum, which was stuck down the pelvis. Once that was completed, we elevated the omentum and brought it over the liver. I examined the liver and the liver appeared to have no evidence of metastatic disease. I then elevated the cecum, identifying the ileocolic pedicle and this was scored along the medial edge and the ileocolic pedicle was skeletonized. I identified the duodenal sweep, I identified and preserved this throughout. After skeletonizing the ileocolic pedicle, we divided using a LigaSure by cauterizing it twice proximally and once distally and dividing it in between. I continued my dissection in the medial to lateral fashion, identifying the avascular plane between the retroperitoneum and the mesentery of the ascending colon. The right ureter was identified and preserved throughout. After freeing up the mesentery, I then took down the [employer] [place] along the lateral peritoneal reflection, all the way up to the hepatic flexure. The hepatocolic ligaments were taken down. This was freed up. I divided along the mesentery up to the distal ileum from my proximal resection margin. I then continued my dissection around the duodenum clearing off the underlying attachments from the duodenum so that the mesentery could to be freed up in its entirety. The hepatic flexure was taken down and continued my dissection in the proximal transverse colon getting access into the lesser sac.   With the right colon fully mobilized, I exteriorized it through the hand port and identified my proximal and distal resection margin. This was divided using a CÉSAR 75-mm blue load stapler. The specimen was removed and sent to pathology for further examination. The both ends of the bowel from the distal ileum to the proximal transverse colon were approximated using a 3-0 Vicryl suture. Colostomy and enterotomy were made and a side-to-side functional end-to-end anastomosis was created using a 75-mm blue load CÉSAR stapler. Common enterotomy was closed using a TX 60 stapler. The anastomosis was dropped back in the abdomen after adequate hemostasis was achieved. The abdomen was re-insufflated, the anastomosis was inspected, no evidence of bleeding was noted. Hemostasis was adequately achieved. I then was ready for closure. The paramedian incision was closed in layers using 2-0 Vicryl suture for the peritoneum, followed by 0 looped PDS suture for the fascia. The supraumbilical incision was closed with 0 Vicryl suture on a UR6 needle. All the skin incisions were closed with 4-0 Monocryl, followed by Exofin. A 30 mL of 0.25% Marcaine with epinephrine was injected subcutaneously. The patient was awakened, extubated and taken to the recovery room in stable condition.       MD SHANNA Melvin/[email or web address]  D:  07/03/2019 16:51  T:  07/03/2019 23:07  JOB #:  [id number]

## 2019-07-04 NOTE — PROGRESS NOTES
CRS POD#1 s/p lap right colectomy    Doing well. Not taking pain meds besides APAP. No nausea or vomiting.   No flatus or BM    BP (!) 134/32   Pulse 66   Temp 98.3 °F (36.8 °C)   Resp 18   Ht 5' 4\" (1.626 m)   Wt 74.8 kg (165 lb)   SpO2 96%   BMI 28.32 kg/m²     NAD, AAOx3  Abd soft, approp TTP  Incisions c/d/i    Plan  Continue current management  Await return of bowel function

## 2019-07-04 NOTE — PROGRESS NOTES
Occupational Therapy    OT orders received chart reviewed. Pt declines PO at this time will follow up for OT assessment. Up to chair eating breakfast with RN present. Donovan Hearn MS OTR/L

## 2019-07-04 NOTE — PROGRESS NOTES
Problem: Self Care Deficits Care Plan (Adult)  Goal: *Acute Goals and Plan of Care (Insert Text)  Description  Occupational Therapy Goals  Initiated 7/4/2019  1. Patient will perform grooming STANDING AT [facility] with modified independence within 7 day(s). 2.  Patient will perform upper body dressing with modified independence within 7 day(s). 3.  Patient will perform lower body dressing WITH AE RETRAINING with supervision/set-up within 7 day(s). 4.  Patient will perform toilet transfers with modified independence within 7 day(s). 5.  Patient will perform all aspects of toileting with modified independence within 7 day(s). 6.  Patient will participate in upper extremity therapeutic exercise/activities with independence for 5 minutes within 7 day(s). 7.  Patient will utilize energy conservation techniques during functional activities with verbal cues within 7 day(s). Outcome: Progressing Towards Goal   OCCUPATIONAL THERAPY EVALUATION  Patient: Isis Valadez (80 y.o. female)  Date: 7/4/2019  Primary Diagnosis: Anemia [D64.9]  Procedure(s) (LRB):  COLON RESECTION LAPAROSCOPIC RIGHT (N/A) 1 Day Post-Op   Precautions: none       ASSESSMENT :  Based on the objective data described below, the patient presents with fair activity tolerance with increased need for assist for ADLs and functional mobility s/p POD1 R colectomy of R colon-12inches per patient, currently HHA x2 for functional mobility in room and bathroom, min A for toileting transfer, max A for toileting clothing management, max A for LB ADls and setup to I for UB ADLs. Abdominal pain limiting, verbally educated on AE for LB ADls, lives with family and was independent with ADLs    Expect 1-2 more OT sessions and no HH needs for OT pending progression. Patient will benefit from skilled intervention to address the above impairments.   Patient?s rehabilitation potential is considered to be Excellent  Factors which may influence rehabilitation potential include:   ? None noted  ? Mental ability/status  ? Medical condition  ? Home/family situation and support systems  ? Safety awareness  ? Pain tolerance/management  ? Other:      PLAN :  Recommendations and Planned Interventions:  ?               Self Care Training                  ? Therapeutic Activities  ? Functional Mobility Training    ? Cognitive Retraining  ? Therapeutic Exercises           ? Endurance Activities  ? Balance Training                   ? Neuromuscular Re-Education  ? Visual/Perceptual Training     ? Home Safety Training  ? Patient Education                 ? Family Training/Education  ? Other (comment):    Frequency/Duration: Patient will be followed by occupational therapy 4 times a week to address goals. Discharge Recommendations: None  Further Equipment Recommendations for Discharge: may need AE, discussed shower chair but has narrow tub shower, hand hed shower head      SUBJECTIVE:   Patient stated ? I was doing all that.?    OBJECTIVE DATA SUMMARY:   HISTORY:   Past Medical History:   Diagnosis Date    Hypertension      Past Surgical History:   Procedure Laterality Date    COLONOSCOPY N/A 7/1/2019    COLONOSCOPY performed by Charbel Coates MD at [facility] ENDOSCOPY    COLONOSCOPY,DIAGNOSTIC  7/1/2019         COLONOSCPY,FLEX,W/ [address] INJECT  7/1/2019         HX ORTHOPAEDIC      tia. hip replacement    UPPER GI ENDOSCOPY,BIOPSY  7/1/2019            Prior Level of Function/Environment/Context: lives with dtr and SHERIF who is a [employer], patient was independent has BSC over toilet   Occupations in which the patient is/was successful, what are the barriers preventing that success:   Performance Patterns (routines, roles, habits, and rituals):   Personal Interests and/or values: Expanded or extensive additional review of patient history:     Home Situation  Home Environment: Private residence  # Steps to Enter: 3  Rails to Enter: Yes  Hand Rails : Left  One/Two Story Residence: One story  Living Alone: No  Support Systems: Family member(s)  Patient Expects to be Discharged to[de-identified] Private residence  Current DME Used/Available at Home: [employer] soliman    Hand dominance: Right    EXAMINATION OF PERFORMANCE DEFICITS:  Cognitive/Behavioral Status:                      Skin: intact no drainage from lap sites    Edema: abdominal    Hearing: Auditory  Auditory Impairment: Hard of hearing, left side    Vision/Perceptual:            WDL                         Range of Motion:  B UE WFL shoulder flxion > 120* flexion, hand wrist and elbows intact                            Strength:  B UE 3/5 limited due to abdominal pain, will further assess  Strength: Generally decreased, functional                Coordination:   WFL            Tone & Sensation:  B UE WFL                            Balance:  Sitting: Intact  Standing: Impaired  Standing - Static: Good  Standing - Dynamic : Fair    Functional Mobility and Transfers for ADLs:  Bed Mobility:  Rolling: Stand-by assistance  Sit to Supine: Minimum assistance    Transfers:  Sit to Stand: Minimum assistance  Stand to Sit: Contact guard assistance  Bathroom Mobility: Minimum assistance(HHA)  Toilet Transfer : Contact guard assistance    ADL Assessment:  Feeding: Independent    Oral Facial Hygiene/Grooming: Setup    Bathing: Moderate assistance(for LB and unsteadiness when standing)    Upper Body Dressing: Setup    Lower Body Dressing: Total assistance(abdominal pain, needs AE retraining)    Toileting: Total assistance(to removed, total A for hygiene, max A for clothing manag)     Completed OT evaluation and ADLs seated EOB and standing as able with HHA x2 for balance.  Educated on safety and endurance training with encouragement for full participation in ADLs while in hospital. Good understanding noted. ADL Intervention and task modifications:  Feeding  Feeding Assistance: Independent    Grooming  Grooming Assistance: Set-up  Washing Face: Set-up  Washing Hands: Contact guard assistance(at sink)  Brushing Teeth: Set-up       encouraged OOB and full participation with ADLs to improve strength and endurance. Patient instructed and indicated understanding the benefits of maintaining activity tolerance, functional mobility, and independence with self care tasks during acute stay  to ensure safe return home and to baseline. Encouraged patient to increase frequency and duration OOB, be out of bed for all meals, perform daily ADLs (as approved by RN/MD regarding bathing etc), and performing functional mobility to/from bathroom. Patient educated on use of adaptive equipment (ie. Reacher, sock aid, long shoe horn, long handled sponge, and dressing stick) in order to complete LB dressing. Patient instructed on technique to complete. Patient indicated understanding/recalled strategies with verbal cues. [address]: Minimum  assistance    Lower Body Dressing Assistance  Socks: Total assistance (dependent)(abdominal pain)  Position Performed: Seated in chair  Cues: Doff    Toileting  Bladder Hygiene: Supervision  Clothing Management: Maximum assistance         Therapeutic Exercise:      Functional Measure:  Barthel Index:    Bathin  Bladder: 5  Bowels: 5  Groomin  Dressin  Feeding: 10  Mobility: 10  Stairs: 0  Toilet Use: 5  Transfer (Bed to Chair and Back): 10  Total: 55/100        Percentage of impairment   0%   1-19%   20-39%   40-59%   60-79%   80-99%   100%   Barthel Score 0-100 100 99-80 79-60 59-40 20-39 1-19   0     The Barthel ADL Index: Guidelines  1. The index should be used as a record of what a patient does, not as a record of what a patient could do.   2. The main aim is to establish degree of independence from any help, physical or verbal, however minor and for whatever reason. 3. The need for supervision renders the patient not independent. 4. A patient's performance should be established using the best available evidence. Asking the patient, friends/relatives and nurses are the usual sources, but direct observation and common sense are also important. However direct testing is not needed. 5. Usually the patient's performance over the preceding 24-48 hours is important, but occasionally longer periods will be relevant. 6. Middle categories imply that the patient supplies over 50 per cent of the effort. 7. Use of aids to be independent is allowed. Dilcia Fong., Barthel, D.W. ([id number]). Functional evaluation: the Barthel Index. [address] (14)2. Glory Keith bonnie LAVON Whitfield, Alireza Major, Arcelia Gordon., [place], [address] [place] [address] Ave (1999). Measuring the change indisability after inpatient rehabilitation; comparison of the responsiveness of the Barthel Index and Functional [place] Measure. Journal of Neurology, Neurosurgery, and Psychiatry, 66(4), 074-578. Celio Sorenson, N.J.A, BRUNA Novak, & Devika Clayton MAylinA. (2004.) Assessment of post-stroke quality of life in cost-effectiveness studies: The usefulness of the Barthel Index and the EuroQoL-5D.  Quality of Life Research, 15, 566-90         Occupational Therapy Evaluation Charge Determination   History Examination Decision-Making   LOW Complexity : Brief history review  LOW Complexity : 1-3 performance deficits relating to physical, cognitive , or psychosocial skils that result in activity limitations and / or participation restrictions  LOW Complexity : No comorbidities that affect functional and no verbal or physical assistance needed to complete eval tasks       Based on the above components, the patient evaluation is determined to be of the following complexity level: LOW   Pain:  Pain Scale 1: Numeric (0 - 10)  Pain Intensity 1: 3  Pain Location 1: Abdomen     Pain Description 1: Sore  Pain Intervention(s) 1: Medication (see MAR)  Activity Tolerance:   Fair, pain limiting but controlled with tylenol  Please refer to the flowsheet for vital signs taken during this treatment. After treatment:   ? Patient left in no apparent distress sitting up in chair  ? Patient left in no apparent distress in bed  ? Call bell left within reach  ? Nursing notified  ? Caregiver present  ? Bed alarm activated    COMMUNICATION/EDUCATION:   The patient?s plan of care was discussed with: Physical Therapist and Registered Nurse.  ? Home safety education was provided and the patient/caregiver indicated understanding. ? Patient/family have participated as able in goal setting and plan of care. ? Patient/family agree to work toward stated goals and plan of care. ? Patient understands intent and goals of therapy, but is neutral about his/her participation. ? Patient is unable to participate in goal setting and plan of care. This patient?s plan of care is appropriate for delegation to [facility].     Thank you for this referral.  Tio Hearn, OT  Time Calculation: 25 mins

## 2019-07-04 NOTE — PROGRESS NOTES
Pt alert and oriented x4. C/o pain made tolerable with medications. Tolerated medication with no issues. IV clean dry intact. Sylvester intact and draining with no issues. Abdominal incisions clean and dry. No further issues this shift.

## 2019-07-04 NOTE — PROGRESS NOTES
Problem: Mobility Impaired (Adult and Pediatric)  Goal: *Acute Goals and Plan of Care (Insert Text)  Description  Physical Therapy Goals  Initiated 7/2/2019  Goals reassessed 7/4/2019 and remain appropriate    1. Patient will transfer from bed to chair and chair to bed with independence using the least restrictive device within 7 day(s). 2.  Patient will ambulate with independence for 250 feet with the least restrictive device within 7 day(s). 3.  Patient will ascend/descend 5 stairs with 1 handrail(s) with supervision/set-up within 7 day(s). PHYSICAL THERAPY REEVALUATION  Patient: Isis Muller (80 y.o. female)  Date: 7/4/2019  Primary Diagnosis: Anemia [D64.9]  Procedure(s) (LRB):  COLON RESECTION LAPAROSCOPIC RIGHT (N/A) 1 Day Post-Op   Precautions: post surgical     Chart, physical therapy assessment, plan of care and goals were reviewed. ASSESSMENT :  Based on the objective data described below, the patient presents with decreased activity tolerance and overall functional mobility following colon resection. Pt sitting in chair on arrival, agreeable to work with Therapy. BP in sitting 140/36, standing 136/41, asymptomatic. Pt ambulated 25' with HHA of 2, limited by report of pain at surgical site. PTA, pt independent, she lives with daughter and Son in law who will assist as needed. No PT discharge needs anticipated. Patient will benefit from skilled intervention to address the above impairments. Patient?s rehabilitation potential is considered to be Good  Factors which may influence rehabilitation potential include:   ? None noted  ? Mental ability/status  ? Medical condition  ? Home/family situation and support systems  ? Safety awareness  ? Pain tolerance/management  ? Other:      PLAN :  Recommendations and Planned Interventions:  ?             Bed Mobility Training             ? Neuromuscular Re-Education  ? Transfer Training                   ? Orthotic/Prosthetic Training  ? Gait Training                         ? Modalities  ? Therapeutic Exercises           ? Edema Management/Control  ? Therapeutic Activities            ? Patient and Family Training/Education  ? Other (comment):  Frequency/Duration: Patient will be followed by physical therapy 5 times a week to address goals. Discharge Recommendations: none  Further Equipment Recommendations for Discharge: none      SUBJECTIVE:   Patient stated ? I'm tired. ?    OBJECTIVE DATA SUMMARY:     Past Medical History:   Diagnosis Date    Hypertension      Past Surgical History:   Procedure Laterality Date    COLONOSCOPY N/A 2019    COLONOSCOPY performed by Reta Johnson MD at [facility] ENDOSCOPY    COLONOSCOPY,DIAGNOSTIC  2019         COLONOSCPY,FLEX,W/ [address] INJECT  2019         HX ORTHOPAEDIC      tia. hip replacement    UPPER GI ENDOSCOPY,BIOPSY  2019          Hospital course since last seen and reason for reevaluation: colon resection  Critical Behavior:  Neurologic State: Alert  Orientation Level: Oriented X4  Cognition: Appropriate decision making  Safety/Judgement: Awareness of environment    Strength:    Strength: Generally decreased, functional      Functional Mobility:  Bed Mobility:  Rolling: Stand-by assistance  Sit to Supine: Minimum assistance     Transfers:  Sit to Stand: Minimum assistance  Stand to Sit: Contact guard assistance    Balance:   Sitting: Intact  Standing: Impaired  Standing - Static: Good  Standing - Dynamic : Fair  Ambulation/Gait Training:  Distance (ft): 25 Feet (ft)  Ambulation - Level of Assistance: Contact guard assistance  Speed/Maira: Pace decreased (<100 feet/min)    Functional Measure:  Barthel Index:    Bathin  Bladder: 5  Bowels: 5  Groomin  Dressin  Feeding: 10  Mobility: 10  Stairs: 0  Toilet Use: 5  Transfer (Bed to Chair and Back): 10  Total: 55/100       Percentage of impairment   0%   1-19%   20-39%   40-59%   60-79%   80-99%   100%   Barthel Score 0-100 100 99-80 79-60 59-40 20-39 1-19   0     The Barthel ADL Index: Guidelines  1. The index should be used as a record of what a patient does, not as a record of what a patient could do. 2. The main aim is to establish degree of independence from any help, physical or verbal, however minor and for whatever reason. 3. The need for supervision renders the patient not independent. 4. A patient's performance should be established using the best available evidence. Asking the patient, friends/relatives and nurses are the usual sources, but direct observation and common sense are also important. However direct testing is not needed. 5. Usually the patient's performance over the preceding 24-48 hours is important, but occasionally longer periods will be relevant. 6. Middle categories imply that the patient supplies over 50 per cent of the effort. 7. Use of aids to be independent is allowed. Cammie Tena., Barthel, D.W. ([id number]). Functional evaluation: the Barthel Index. [address] [facility] (14)2. LAVON Elliott, Carl Fuentes., Evelyn Lange., [place], [address] [place] [address] (1999). Measuring the change indisability after inpatient rehabilitation; comparison of the responsiveness of the Barthel Index and Functional [place] Measure. Journal of Neurology, Neurosurgery, and Psychiatry, 66(4), 634-871. CELINE Will.MADELINE, BRUNA Novak, & Dee Dee Díaz MRAUL. (2004.) Assessment of post-stroke quality of life in cost-effectiveness studies: The usefulness of the Barthel Index and the EuroQoL-5D.  Quality of Life Research, 13, 427-43        Pain:  Pain Scale 1: Numeric (0 - 10)  Pain Intensity 1: 3  Pain Location 1: Abdomen     Pain Description 1: Sore  Pain Intervention(s) 1: Medication (see MAR)  Activity Tolerance:   Fair, fatigues quickly  Please refer to the flowsheet for vital signs taken during this treatment. After treatment:   ?  Patient left in no apparent distress sitting up in chair  ? Patient left in no apparent distress in bed  ? Call bell left within reach  ? Nursing notified  ? Caregiver present  ? Bed alarm activated    COMMUNICATION/EDUCATION:   The patient?s plan of care was discussed with: Occupational Therapist and Registered Nurse. ?  Fall prevention education was provided and the patient/caregiver indicated understanding. ? Patient/family have participated as able in goal setting and plan of care. ?  Patient/family agree to work toward stated goals and plan of care. ?  Patient understands intent and goals of therapy, but is neutral about his/her participation. ? Patient is unable to participate in goal setting and plan of care.     Thank you for this referral.  Soto Dover, PT   Time Calculation: 20 mins

## 2019-07-04 NOTE — PROGRESS NOTES
Surgery NP Progress Note    s/p COLON RESECTION LAPAROSCOPIC RIGHT on 7/3/2019       Assessment:   Active Problems:    Anemia (2019)        Expected post-op progress. Patient with some pain but reluctant to take anything other than tylenol. Pain is not impairing her ADLs or mobility, just making things slower. Plan/Recommendations/Medical Decision Making:     - Mobilize with nursing and OOB to chair for meals  - Continue diet  - Pain management- Continue current pain control methods.   - VTE Prophylaxis: Lovenox    Discharge Planning    Plan for patient to discharge to Home- No Needs anticipated but will watch for therapy recommendations. Anticipated discharge date 19 at the earliest.     Incision/Wound Care Needs:  Routine post-op, patient will self manage as instructed    Discharge plan discussed with:  Patient    Subjective:     Patient has complaints of pain. Pain control inadequate. Patient declining further pain med however. Education provided. Patient reports PO intake adequate. No nausea/vomiting. Negative flatus. Negative stool output. Voiding status: Sylvester in place. Remove today. Objective:     Blood pressure 139/60, pulse 70, temperature 98.6 °F (37 °C), resp. rate 18, height 5' 4\" (1.626 m), weight 74.8 kg (165 lb), SpO2 96 %. Temp (24hrs), Av.9 °F (36.6 °C), Min:97.5 °F (36.4 °C), Max:98.6 °F (37 °C)      Recent Results (from the past 48 hour(s))   FERRITIN    Collection Time: 19  1:34 PM   Result Value Ref Range    Ferritin 106 8 - 252 NG/ML   CEA    Collection Time: 19  1:34 PM   Result Value Ref Range    CEA 1.6 ng/mL   SAMPLES BEING HELD    Collection Time: 19  1:34 PM   Result Value Ref Range    SAMPLES BEING HELD 1PST 1GOLD     COMMENT        Add-on orders for these samples will be processed based on acceptable specimen integrity and analyte stability, which may vary by analyte.    CBC W/O DIFF    Collection Time: 19  4:07 AM   Result Value Ref Range    WBC 15.0 (H) 3.6 - 11.0 K/uL    RBC 3.76 (L) 3.80 - 5.20 M/uL    HGB 8.5 (L) 11.5 - 16.0 g/dL    HCT 27.0 (L) 35.0 - 47.0 %    MCV 71.8 (L) 80.0 - 99.0 FL    MCH 22.6 (L) 26.0 - 34.0 PG    MCHC 31.5 30.0 - 36.5 g/dL    RDW 20.2 (H) 11.5 - 14.5 %    PLATELET 207 988 - 854 K/uL    MPV 9.8 8.9 - 12.9 FL    NRBC 0.0 0  WBC    ABSOLUTE NRBC 0.00 0.00 - 7.30 K/uL   METABOLIC PANEL, BASIC    Collection Time: 07/04/19  4:07 AM   Result Value Ref Range    Sodium 140 136 - 145 mmol/L    Potassium 3.9 3.5 - 5.1 mmol/L    Chloride 109 (H) 97 - 108 mmol/L    CO2 23 21 - 32 mmol/L    Anion gap 8 5 - 15 mmol/L    Glucose 134 (H) 65 - 100 mg/dL    BUN 8 6 - 20 MG/DL    Creatinine 0.98 0.55 - 1.02 MG/DL    BUN/Creatinine ratio 8 (L) 12 - 20      GFR est AA >60 >60 ml/min/1.73m2    GFR est non-AA 54 (L) >60 ml/min/1.73m2    Calcium 8.5 8.5 - 10.1 MG/DL   MAGNESIUM    Collection Time: 07/04/19  4:07 AM   Result Value Ref Range    Magnesium 2.4 1.6 - 2.4 mg/dL   PHOSPHORUS    Collection Time: 07/04/19  4:07 AM   Result Value Ref Range    Phosphorus 4.5 2.6 - 4.7 MG/DL       Pt resting in chair. NAD   Incisions CDI. SCDs for mechanical DVT proph while in bed    Body mass index is 28.32 kg/m². Reference: BMI greater than 30 is classified as obesity and greater than 40 is classified as morbid obesity.      Last 3 Recorded Weights in this Encounter    06/28/19 4255   Weight: 74.8 kg (165 lb)         [place] Sep, NP   MSN, APRN, FNP-C, CWOCN-AP    07/04/19

## 2019-07-05 PROCEDURE — 77010033678 HC OXYGEN DAILY

## 2019-07-05 PROCEDURE — 65270000029 HC RM PRIVATE

## 2019-07-05 PROCEDURE — 74011250636 HC RX REV CODE- 250/636: Performed by: SURGERY

## 2019-07-05 PROCEDURE — 97530 THERAPEUTIC ACTIVITIES: CPT

## 2019-07-05 PROCEDURE — 74011250637 HC RX REV CODE- 250/637: Performed by: SURGERY

## 2019-07-05 PROCEDURE — 94760 N-INVAS EAR/PLS OXIMETRY 1: CPT

## 2019-07-05 PROCEDURE — C9113 INJ PANTOPRAZOLE SODIUM, VIA: HCPCS | Performed by: SURGERY

## 2019-07-05 PROCEDURE — 97116 GAIT TRAINING THERAPY: CPT

## 2019-07-05 PROCEDURE — 74011000250 HC RX REV CODE- 250: Performed by: SURGERY

## 2019-07-05 RX ORDER — ACETAMINOPHEN 500 MG
1000 TABLET ORAL EVERY 6 HOURS
Qty: 40 TAB | Refills: 0 | Status: SHIPPED | OUTPATIENT
Start: 2019-07-05 | End: 2019-07-10

## 2019-07-05 RX ORDER — TRAMADOL HYDROCHLORIDE 50 MG/1
25 TABLET ORAL
Qty: 20 TAB | Refills: 0 | Status: SHIPPED | OUTPATIENT
Start: 2019-07-05 | End: 2019-07-09

## 2019-07-05 RX ADMIN — SODIUM CHLORIDE 40 MG: 9 INJECTION, SOLUTION INTRAMUSCULAR; INTRAVENOUS; SUBCUTANEOUS at 23:13

## 2019-07-05 RX ADMIN — TRAMADOL HYDROCHLORIDE 25 MG: 50 TABLET, FILM COATED ORAL at 09:07

## 2019-07-05 RX ADMIN — ENOXAPARIN SODIUM 40 MG: 40 INJECTION SUBCUTANEOUS at 12:09

## 2019-07-05 RX ADMIN — ALVIMOPAN 12 MG: 12 CAPSULE ORAL at 09:06

## 2019-07-05 RX ADMIN — TRAMADOL HYDROCHLORIDE 25 MG: 50 TABLET, FILM COATED ORAL at 23:13

## 2019-07-05 RX ADMIN — ACETAMINOPHEN 1000 MG: 500 TABLET ORAL at 23:13

## 2019-07-05 RX ADMIN — ACETAMINOPHEN 1000 MG: 500 TABLET ORAL at 04:26

## 2019-07-05 RX ADMIN — ACETAMINOPHEN 1000 MG: 500 TABLET ORAL at 17:58

## 2019-07-05 RX ADMIN — ALVIMOPAN 12 MG: 12 CAPSULE ORAL at 17:58

## 2019-07-05 RX ADMIN — ONDANSETRON 4 MG: 4 TABLET, ORALLY DISINTEGRATING ORAL at 09:06

## 2019-07-05 RX ADMIN — SODIUM CHLORIDE 40 MG: 9 INJECTION, SOLUTION INTRAMUSCULAR; INTRAVENOUS; SUBCUTANEOUS at 09:07

## 2019-07-05 RX ADMIN — ACETAMINOPHEN 1000 MG: 500 TABLET ORAL at 12:09

## 2019-07-05 RX ADMIN — HYDRALAZINE HYDROCHLORIDE 10 MG: 20 INJECTION INTRAMUSCULAR; INTRAVENOUS at 03:49

## 2019-07-05 NOTE — PROGRESS NOTES
Problem: Mobility Impaired (Adult and Pediatric)  Goal: *Acute Goals and Plan of Care (Insert Text)  Description  Physical Therapy Goals  Initiated 7/2/2019  Goals reassessed 7/4/2019 and remain appropriate    1. Patient will transfer from bed to chair and chair to bed with independence using the least restrictive device within 7 day(s). 2.  Patient will ambulate with independence for 250 feet with the least restrictive device within 7 day(s). 3.  Patient will ascend/descend 5 stairs with 1 handrail(s) with supervision/set-up within 7 day(s). Outcome: Not Met   PHYSICAL THERAPY TREATMENT  Patient: Isis Altamese Area (80 y.o. female)  Date: 7/5/2019  Diagnosis: Anemia [D64.9] S/P right colectomy  Procedure(s) (LRB):  COLON RESECTION LAPAROSCOPIC RIGHT (N/A) 2 Days Post-Op  Precautions:    Chart, physical therapy assessment, plan of care and goals were reviewed. ASSESSMENT:  Physical therapy visit completed on an 80year old female admitted with anemia and s/p colon resection. Patient received in bed, very sleepy and fatigued today and required max encouragement to minimally participate. Patient complained frequently that she was too tired to get up and requested numerous times to go back to bed. Patient able to come to sit with min assist and verbal cuing and maintains unsupported balance. BP checked and within acceptable parameters and sats in mid 90s on 2 liters O2. RN present in room and assisted with encouraging patient. Patient came to stand with hand held assist x 2 and ambulated short distance to chair. Patient left sitting up with daughter in room and call bell in reach. Patient expects to discharge home but will need to continue to be OOB in order to improve tolerance of activity. Progression toward goals:  ?    Improving appropriately and progressing toward goals  ? Improving slowly and progressing toward goals  ?     Not making progress toward goals and plan of care will be adjusted PLAN:  Patient continues to benefit from skilled intervention to address the above impairments. Continue treatment per established plan of care. Discharge Recommendations:  Home Health for safety eval  Further Equipment Recommendations for Discharge:  none      SUBJECTIVE:   Patient stated I can't do it    OBJECTIVE DATA SUMMARY:   Critical Behavior:  Neurologic State: Alert  Orientation Level: Oriented X4  Cognition: Appropriate decision making  Safety/Judgement: Awareness of environment  Functional Mobility Training:  Bed Mobility:  Rolling: Stand-by assistance     Sit to Supine: Minimum assistance           Transfers:  Sit to Stand: Minimum assistance;Assist x2  Stand to Sit: Minimum assistance;Assist x2                             Balance:  Sitting: Intact  Standing: Impaired  Standing - Static: Constant support;Good  Standing - Dynamic : Constant support; Fair  Ambulation/Gait Training:  Distance (ft): 5 Feet (ft)  Assistive Device: Gait belt  Ambulation - Level of Assistance: Minimal assistance;Assist x2                       Speed/Maira: Pace decreased (<100 feet/min)                       Stairs:              Pain:  Pain Scale 1: Numeric (0 - 10)  Pain Intensity 1: 0  Pain Location 1: Abdomen  Pain Orientation 1: Anterior;Medial  Pain Description 1: Aching;Cramping  Pain Intervention(s) 1: Medication (see MAR); Repositioned  Activity Tolerance:   Fair, but needs encouragement  Please refer to the flowsheet for vital signs taken during this treatment. After treatment:   ?    Patient left in no apparent distress sitting up in chair  ? Patient left in no apparent distress in bed  ? Call bell left within reach  ? Nursing notified  ? Caregiver present  ?     Bed alarm activated    COMMUNICATION/COLLABORATION:   The patients plan of care was discussed with: Registered Nurse    Shaila Monge, PT   Time Calculation: 31 mins

## 2019-07-05 NOTE — PROGRESS NOTES
Nutrition Assessment:    RECOMMENDATIONS:   Continue diet and supplements    DIETITIANS INTERVENTIONS/PLAN:   Continue diet/supplements as tolerated  Monitor appetite/PO intake    ASSESSMENT:   Pt admitted with GI bleed + nearly obstructing R colon mass. PMH: HTN. Chart reviewed for LOS. Pt is POD# 2 lap R colectomy. Her appetite is fairly good per RN flowsheet's. Labs reviewed (from yesterday), WNL. BM noted yesterday. Pt is getting Ensure BID. Current intake is likely adequate to meet est needs. SUBJECTIVE/OBJECTIVE:     Diet Order: Other (comment)(Low Fiber + Ensure BID )  % Eaten:    Patient Vitals for the past 72 hrs:   % Diet Eaten   07/04/19 1006 80 %   07/03/19 0839 0 %     Pertinent Medications:entereg, protonix. Chemistries:  Lab Results   Component Value Date/Time    Sodium 140 07/04/2019 04:07 AM    Potassium 3.9 07/04/2019 04:07 AM    Chloride 109 (H) 07/04/2019 04:07 AM    CO2 23 07/04/2019 04:07 AM    Anion gap 8 07/04/2019 04:07 AM    Glucose 134 (H) 07/04/2019 04:07 AM    BUN 8 07/04/2019 04:07 AM    Creatinine 0.98 07/04/2019 04:07 AM    BUN/Creatinine ratio 8 (L) 07/04/2019 04:07 AM    GFR est AA >60 07/04/2019 04:07 AM    GFR est non-AA 54 (L) 07/04/2019 04:07 AM    Calcium 8.5 07/04/2019 04:07 AM    AST (SGOT) 16 06/28/2019 07:16 PM    Alk. phosphatase 85 06/28/2019 07:16 PM    Protein, total 7.2 06/28/2019 07:16 PM    Albumin 3.3 (L) 06/28/2019 07:16 PM    Globulin 3.9 06/28/2019 07:16 PM    A-G Ratio 0.8 (L) 06/28/2019 07:16 PM    ALT (SGPT) 23 06/28/2019 07:16 PM      Anthropometrics: Height: 5' 4\" (162.6 cm) Weight: 74.8 kg (165 lb)  []bed scale    []stated   [x]unknown/estimated    IBW (%IBW):   ( ) UBW (%UBW):   (  %)    BMI: Body mass index is 28.32 kg/m².     This BMI is indicative of:  []Underweight   []Normal   [x]Overweight   [] Obesity   [] Extreme Obesity (BMI>40)  Estimated Nutrition Needs (Based on): 1518 Kcals/day(MSJ 1168 x 1.3) , 75 g(1gPro/kg) Protein  Carbohydrate: At Least 130 g/day  Fluids: 1500 mL/day    Last BM: 7/4   []Active     []Hyperactive  [x]Hypoactive       [] Absent   BS  Skin:    [] Intact   [x] Incision  [] Breakdown   [] DTI   [] Tears/Excoriation/Abrasion  []Edema [] Other: Wt Readings from Last 30 Encounters:   06/28/19 74.8 kg (165 lb)      NUTRITION DIAGNOSES:   Problem:  No nutritional diagnosis at this time        NUTRITION INTERVENTIONS:  Meals/Snacks: General/healthful diet   Supplements: Commercial supplement              GOAL:   Pt will consume >70% of meals/supplements in 4-6 days.      Cultural, Lutheran, or Ethnic Dietary Needs: None     LEARNING NEEDS (Diet, Food/Nutrient-Drug Interaction):    [x] None Identified   [] Identified and Education Provided/Documented   [] Identified and Pt declined/was not appropriate      [x] Interdisciplinary Care Plan Reviewed/Documented    [x] Participated in Discharge Planning: Per surgeon   [] Interdisciplinary Rounds     NUTRITION RISK:    [] High              [] Moderate           [x]  Low  []  Minimal/Uncompromised    PT SEEN FOR:    []  MD Consult: []Calorie Count      []Diabetic Diet Education        []Diet Education     []Electrolyte Management     []General Nutrition Management and Supplements     []Management of Tube Feeding     []TPN Recommendations    []  RN Referral:  []MST score >=2     []Enteral/Parenteral Nutrition PTA     []Pregnant: Gestational DM or Multigestation   []  Low BMI  []  Re-Screen   [x]  LOS   []  NPO/clears x 5 days   []  New TF/TPN    Mane Reyna RD, [address]    Pager [phone]  Weekend Pager [phone]

## 2019-07-05 NOTE — PROGRESS NOTES
Hospitalist Progress Note    NAME: Isis Jean   :  1932   MRN:  [id number]       Assessment / Plan:    Acute Blood Loss Anemia POA  Colon Mass  Leukocytosis, likely postsurgical stress reaction, monitor  -baseline Hgb in th 10-11 range, hb stable ~ 8  -positive FOBT and dark stool at home  -s/p EGD showing Duodenal Polyp and Colonoscopy ,showing Colonic Mass  -continue IV PPI  -CRS consulted, S/P laparoscopic right colectomy 7/3, pod # 2  -appreciate oncology consult     Hypertension with Hypertensive Urgency on presentation   Sinus Bradycardia: resolved  -Holding Bystolic for now    Hypokalemia:  -replete and monitor     Overweight   Body mass index is 28.32 kg/m².     Code status: DNR  Prophylaxis: SCD's  Recommended Disposition: Home w/Family     Subjective:     Chief Complaint / Reason for Physician Visit: follow-up weakness, anemia  Patient seen for follow-up  'I am having abdominal pain and nausea. I am not passing gas yet\"    Review of Systems:  Symptom Y/N Comments  Symptom Y/N Comments   Fever/Chills    Chest Pain n    Poor Appetite    Edema     Cough    Abdominal Pain n    Sputum    Joint Pain     SOB/MCPHERSON n resolved  Pruritis/Rash     Nausea/vomit y No vomiting  Tolerating PT/OT     Diarrhea    Tolerating Diet     Constipation    Other       Could NOT obtain due to:      Objective:     VITALS:   Last 24hrs VS reviewed since prior progress note.  Most recent are:  Patient Vitals for the past 24 hrs:   Temp Pulse Resp BP SpO2   19 0812 98.3 °F (36.8 °C) 82 18 144/47 96 %   19 0344 98.7 °F (37.1 °C) 88 18 166/52 96 %   19 2257 98.7 °F (37.1 °C) 90 16 153/53 90 %   19 98.3 °F (36.8 °C) 84 16 150/49 92 %   19 1725 98.6 °F (37 °C) 74 16 158/59 94 %   19 1300  69  (!) 140/36 95 %   19 1249 98.3 °F (36.8 °C) 66 18 (!) 134/32 96 %       Intake/Output Summary (Last 24 hours) at 2019 1112  Last data filed at 2019  Gross per 24 hour   Intake 200 ml   Output 100 ml   Net 100 ml        PHYSICAL EXAM:  General: Alert, cooperative, no acute distress    EENT:  EOMI. Anicteric sclerae. MM dry  Resp:  CTA bilaterally, no wheezing or rales. No accessory muscle use  CV:  Regular  rhythm,  No edema  GI:  Soft, surgical wounds look clean  Neurologic:  Alert and oriented X 3, normal speech, cn 2-12 grossly intact  Psych:   Good insight. Not anxious nor agitated  Skin:  No rashes, still Pale. No jaundice    Reviewed most current lab test results and cultures  YES  Reviewed most current radiology test results   YES  Review and summation of old records today    NO  Reviewed patient's current orders and MAR    YES  PMH/SH reviewed - no change compared to H&P  ________________________________________________________________________  Care Plan discussed with:    Comments   Patient x    Family      RN x    Care Manager     Consultant                        Multidiciplinary team rounds were held today with , nursing, pharmacist and clinical coordinator. Patient's plan of care was discussed; medications were reviewed and discharge planning was addressed. ________________________________________________________________________  Total NON critical care TIME: 30   Minutes    Total CRITICAL CARE TIME Spent:   Minutes non procedure based      Comments   >50% of visit spent in counseling and coordination of care x    ________________________________________________________________________  Khushi Valentine MD     Procedures: see electronic medical records for all procedures/Xrays and details which were not copied into this note but were reviewed prior to creation of Plan. LABS:  I reviewed today's most current labs and imaging studies.   Pertinent labs include:  Recent Labs     07/04/19  0407   WBC 15.0*   HGB 8.5*   HCT 27.0*        Recent Labs     07/04/19 0407      K 3.9   *   CO2 23   *   BUN 8   CREA 0.98   CA 8.5   MG 2.4   PHOS 4.5       Signed: Matilde Schroeder MD

## 2019-07-05 NOTE — PROGRESS NOTES
Hematology Oncology Progress Note       Follow up for: colon cancer  Chart notes reviewed since last visit. Case discussed with following: daughter, her son in law (a )    Patient complains of the following: sleepy, In good spirits. Additional concerns noted by the staff:     Patient Vitals for the past 24 hrs:   BP Temp Pulse Resp SpO2   07/05/19 1145 148/49 98.1 °F (36.7 °C) 77 18 98 %   07/05/19 0812 144/47 98.3 °F (36.8 °C) 82 18 96 %   07/05/19 0344 166/52 98.7 °F (37.1 °C) 88 18 96 %   07/04/19 2257 153/53 98.7 °F (37.1 °C) 90 16 90 %   07/04/19 2013 150/49 98.3 °F (36.8 °C) 84 16 92 %   07/04/19 1725 158/59 98.6 °F (37 °C) 74 16 94 %       ROS: negative for 11 organ systems except as noted. Physical Examination:  Constitutional Alert, cooperative, oriented. Mood and affect appropriate. Appears close to chronological age. Well nourished. Well developed. Head Normocephalic; no scars   Eyes Conjunctivae and sclerae are clear and without icterus. Pupils are round   ENMT Sinuses are nontender. No oral exudates, ulcers, masses, thrush or mucositis. Oropharynx clear. Tongue normal.   Neck Supple without masses or thyromegaly. No jugular venous distension. Hematologic/Lymphatic No petechiae or purpura. No tender or palpable lymph nodes in the cervical, supraclavicular, axillary regions. Respiratory Lungs are clear to auscultation without rhonchi or wheezing. Cardiovascular Regular rate and rhythm of heart without murmurs, gallops or rubs. Chest / Line Site Chest is symmetric with no chest wall deformities. Abdomen Non-tender, non-distended, no masses, ascites or hepatosplenomegaly. Good bowel sounds. Musculoskeletal No tenderness or swelling, normal range of motion without obvious weakness. Extremities No visible deformities, no cyanosis, clubbing or edema. Skin No rashes, scars, or lesions suggestive of malignancy. No petechiae, purpura, or ecchymoses. No excoriations. Neurologic No sensory or motor deficits noted but not specifically tested. Psychiatric Alert and oriented. Coherent speech. Verbalizes understanding of our discussions today. Labs:  No results found for this or any previous visit (from the past 24 hour(s)). Assessment and Plan:   Colon cancer - s/p resection. Will followup on path when available. questions answered re surgery and followup. Can see in 3-4 weeks for followup of anemia and then q 3 months for cancer followup if desired.

## 2019-07-05 NOTE — PROGRESS NOTES
CRS POD#2 s/p lap right colectomy    Feel very tired today. Pain only when she moves but she hasnt taken anything other than tylenol for pain. No nausea or vomiting.   No flatus or BM    /47   Pulse 82   Temp 98.3 °F (36.8 °C)   Resp 18   Ht 5' 4\" (1.626 m)   Wt 74.8 kg (165 lb)   SpO2 96%   BMI 28.32 kg/m²     NAD, AAOx3  Abd soft, approp TTP  Incisions c/d/i    Plan  Encouraged her to walk and take the tramadol as prescribed  Continue current management  Await return of bowel function no

## 2019-07-05 NOTE — PROGRESS NOTES
Occupational Therapy Note:    Chart reviewed. Attempted to see pt for OT session. Pt received sleeping soundly, woke briefly to voice and politely declined participation in session at that time. Will continue to follow and attempt again as able. Thank you.     Valli Osgood, OTR/FAROOQ

## 2019-07-06 LAB
ANION GAP SERPL CALC-SCNC: 4 MMOL/L (ref 5–15)
BASOPHILS # BLD: 0 K/UL (ref 0–0.1)
BASOPHILS NFR BLD: 0 % (ref 0–1)
BUN SERPL-MCNC: 10 MG/DL (ref 6–20)
BUN/CREAT SERPL: 15 (ref 12–20)
CALCIUM SERPL-MCNC: 8.6 MG/DL (ref 8.5–10.1)
CHLORIDE SERPL-SCNC: 106 MMOL/L (ref 97–108)
CO2 SERPL-SCNC: 28 MMOL/L (ref 21–32)
CREAT SERPL-MCNC: 0.68 MG/DL (ref 0.55–1.02)
DIFFERENTIAL METHOD BLD: ABNORMAL
EOSINOPHIL # BLD: 0.1 K/UL (ref 0–0.4)
EOSINOPHIL NFR BLD: 1 % (ref 0–7)
ERYTHROCYTE [DISTWIDTH] IN BLOOD BY AUTOMATED COUNT: 22.5 % (ref 11.5–14.5)
GLUCOSE SERPL-MCNC: 89 MG/DL (ref 65–100)
HCT VFR BLD AUTO: 27.1 % (ref 35–47)
HGB BLD-MCNC: 8.1 G/DL (ref 11.5–16)
IMM GRANULOCYTES # BLD AUTO: 0.1 K/UL (ref 0–0.04)
IMM GRANULOCYTES NFR BLD AUTO: 1 % (ref 0–0.5)
LYMPHOCYTES # BLD: 1.4 K/UL (ref 0.8–3.5)
LYMPHOCYTES NFR BLD: 12 % (ref 12–49)
MCH RBC QN AUTO: 21.8 PG (ref 26–34)
MCHC RBC AUTO-ENTMCNC: 29.9 G/DL (ref 30–36.5)
MCV RBC AUTO: 73 FL (ref 80–99)
MONOCYTES # BLD: 0.5 K/UL (ref 0–1)
MONOCYTES NFR BLD: 4 % (ref 5–13)
NEUTS SEG # BLD: 9.7 K/UL (ref 1.8–8)
NEUTS SEG NFR BLD: 82 % (ref 32–75)
NRBC # BLD: 0 K/UL (ref 0–0.01)
NRBC BLD-RTO: 0 PER 100 WBC
PLATELET # BLD AUTO: 248 K/UL (ref 150–400)
PMV BLD AUTO: 9.7 FL (ref 8.9–12.9)
POTASSIUM SERPL-SCNC: 3.6 MMOL/L (ref 3.5–5.1)
RBC # BLD AUTO: 3.71 M/UL (ref 3.8–5.2)
RBC MORPH BLD: ABNORMAL
SODIUM SERPL-SCNC: 138 MMOL/L (ref 136–145)
WBC # BLD AUTO: 11.8 K/UL (ref 3.6–11)

## 2019-07-06 PROCEDURE — 65270000029 HC RM PRIVATE

## 2019-07-06 PROCEDURE — C9113 INJ PANTOPRAZOLE SODIUM, VIA: HCPCS | Performed by: SURGERY

## 2019-07-06 PROCEDURE — 94760 N-INVAS EAR/PLS OXIMETRY 1: CPT

## 2019-07-06 PROCEDURE — 36415 COLL VENOUS BLD VENIPUNCTURE: CPT

## 2019-07-06 PROCEDURE — 74011250636 HC RX REV CODE- 250/636: Performed by: SURGERY

## 2019-07-06 PROCEDURE — 85025 COMPLETE CBC W/AUTO DIFF WBC: CPT

## 2019-07-06 PROCEDURE — 74011250637 HC RX REV CODE- 250/637: Performed by: SURGERY

## 2019-07-06 PROCEDURE — 80048 BASIC METABOLIC PNL TOTAL CA: CPT

## 2019-07-06 PROCEDURE — 74011000250 HC RX REV CODE- 250: Performed by: SURGERY

## 2019-07-06 RX ADMIN — ALVIMOPAN 12 MG: 12 CAPSULE ORAL at 18:17

## 2019-07-06 RX ADMIN — ENOXAPARIN SODIUM 40 MG: 40 INJECTION SUBCUTANEOUS at 09:28

## 2019-07-06 RX ADMIN — ALVIMOPAN 12 MG: 12 CAPSULE ORAL at 09:22

## 2019-07-06 RX ADMIN — TRAMADOL HYDROCHLORIDE 25 MG: 50 TABLET, FILM COATED ORAL at 21:15

## 2019-07-06 RX ADMIN — ACETAMINOPHEN 1000 MG: 500 TABLET ORAL at 05:34

## 2019-07-06 RX ADMIN — ACETAMINOPHEN 1000 MG: 500 TABLET ORAL at 14:12

## 2019-07-06 RX ADMIN — SODIUM CHLORIDE 40 MG: 9 INJECTION, SOLUTION INTRAMUSCULAR; INTRAVENOUS; SUBCUTANEOUS at 09:22

## 2019-07-06 RX ADMIN — SODIUM CHLORIDE 40 MG: 9 INJECTION, SOLUTION INTRAMUSCULAR; INTRAVENOUS; SUBCUTANEOUS at 21:15

## 2019-07-06 NOTE — PROGRESS NOTES
Hospitalist Progress Note    NAME: Isis Hurt   :  1932   MRN:  [id number]       Assessment / Plan:    Acute Blood Loss Anemia POA  AdenoCA of colon  Leukocytosis, likely postsurgical stress reaction, monitor  -baseline Hgb in th 10-11 range, hb stable ~ 8  -positive FOBT and dark stool at home  -s/p EGD showing Duodenal Polyp and Colonoscopy ,showing Colonic Mass, biopsy shows adeno CA  -continue IV PPI  -CRS consulted, S/P laparoscopic right colectomy 7/3, pod # 3, passing gas, start on diet today  -appreciate oncology consult     Hypertension with Hypertensive Urgency on presentation   Sinus Bradycardia: resolved  -Holding Bystolic for now    Hypokalemia:  -replete and monitor     Overweight   Body mass index is 28.32 kg/m².     Code status: DNR  Prophylaxis: SCD's  Recommended Disposition: Home w/Family     Subjective:     Chief Complaint / Reason for Physician Visit: follow-up weakness, anemia  Patient seen for follow-up  'I am doing, I was able to get some sleep after tramadol last night, I am passing gas but no BM yet\"    Review of Systems:  Symptom Y/N Comments  Symptom Y/N Comments   Fever/Chills    Chest Pain n    Poor Appetite    Edema     Cough    Abdominal Pain n    Sputum    Joint Pain     SOB/MCPHERSON n resolved  Pruritis/Rash     Nausea/vomit y No vomiting  Tolerating PT/OT     Diarrhea    Tolerating Diet     Constipation    Other       Could NOT obtain due to:      Objective:     VITALS:   Last 24hrs VS reviewed since prior progress note.  Most recent are:  Patient Vitals for the past 24 hrs:   Temp Pulse Resp BP SpO2   19 1141 98 °F (36.7 °C) 76 16 166/53 92 %   19 0742 98.3 °F (36.8 °C) 70 16 158/50 98 %   19 0400 98 °F (36.7 °C) 69 16 149/50 94 %   19 2039 97.9 °F (36.6 °C) 66 16 161/47 91 %   19 1510 98.9 °F (37.2 °C) 68 18 137/47        Intake/Output Summary (Last 24 hours) at 2019 1206  Last data filed at 2019 0904  Gross per 24 hour   Intake 220 ml Output 1100 ml   Net -880 ml        PHYSICAL EXAM:  General: Alert, cooperative, no acute distress    EENT:  EOMI. Anicteric sclerae. MM dry  Resp:  CTA bilaterally, no wheezing or rales. No accessory muscle use  CV:  Regular  rhythm,  No edema  GI:  Soft, surgical wounds look clean  Neurologic:  Alert and oriented X 3, normal speech, cn 2-12 grossly intact  Psych:   Good insight. Not anxious nor agitated  Skin:  No rashes, still Pale. No jaundice    Reviewed most current lab test results and cultures  YES  Reviewed most current radiology test results   YES  Review and summation of old records today    NO  Reviewed patient's current orders and MAR    YES  PMH/ reviewed - no change compared to H&P  ________________________________________________________________________  Care Plan discussed with:    Comments   Patient x    Family      RN x    Care Manager     Consultant                        Multidiciplinary team rounds were held today with , nursing, pharmacist and clinical coordinator. Patient's plan of care was discussed; medications were reviewed and discharge planning was addressed. ________________________________________________________________________  Total NON critical care TIME: 25 Minutes    Total CRITICAL CARE TIME Spent:   Minutes non procedure based      Comments   >50% of visit spent in counseling and coordination of care x    ________________________________________________________________________  Eduardo Washburn MD     Procedures: see electronic medical records for all procedures/Xrays and details which were not copied into this note but were reviewed prior to creation of Plan. LABS:  I reviewed today's most current labs and imaging studies.   Pertinent labs include:  Recent Labs     07/06/19  0452 07/04/19  0407   WBC 11.8* 15.0*   HGB 8.1* 8.5*   HCT 27.1* 27.0*    246     Recent Labs     07/06/19 0452 07/04/19  0407    140   K 3.6 3.9    109*   CO2 28 23   GLU 89 134*   BUN 10 8   CREA 0.68 0.98   CA 8.6 8.5   MG  --  2.4   PHOS  --  4.5       Signed: George Dang MD

## 2019-07-06 NOTE — PROGRESS NOTES
Pt alert and oriented x4. C/o pain made tolerable with medication. 94 on 2L o2 no signs of respiratory distress noted at this time. IV clean dry intact no issues. Tolerated meds with no reactions noted. Abdominal incisions clean dry no issues. up to bathroom with x1 stand by assist and walker.

## 2019-07-06 NOTE — PROGRESS NOTES
CRS POD#3 s/p lap right colectomy    Feeling better today - had a dose of tramadol before bed and this helped her get some sleep. Tolerating solid diet, passing some flatus but no BMs.     /50   Pulse 70   Temp 98.3 °F (36.8 °C)   Resp 16   Ht 5' 4\" (1.626 m)   Wt 74.8 kg (165 lb)   SpO2 98%   BMI 28.32 kg/m²     NAD, AAOx3  Abd soft, approp TTP  Incisions c/d/i    Plan  - oob at  - oral pain meds  - soft diet

## 2019-07-07 PROCEDURE — 94760 N-INVAS EAR/PLS OXIMETRY 1: CPT

## 2019-07-07 PROCEDURE — 74011000250 HC RX REV CODE- 250: Performed by: SURGERY

## 2019-07-07 PROCEDURE — 74011250636 HC RX REV CODE- 250/636: Performed by: SURGERY

## 2019-07-07 PROCEDURE — 74011250637 HC RX REV CODE- 250/637: Performed by: SURGERY

## 2019-07-07 PROCEDURE — C9113 INJ PANTOPRAZOLE SODIUM, VIA: HCPCS | Performed by: SURGERY

## 2019-07-07 PROCEDURE — 65270000029 HC RM PRIVATE

## 2019-07-07 PROCEDURE — 74011250637 HC RX REV CODE- 250/637: Performed by: HOSPITALIST

## 2019-07-07 RX ORDER — PANTOPRAZOLE SODIUM 40 MG/1
40 TABLET, DELAYED RELEASE ORAL
Status: DISCONTINUED | OUTPATIENT
Start: 2019-07-07 | End: 2019-07-09 | Stop reason: HOSPADM

## 2019-07-07 RX ORDER — NEBIVOLOL 2.5 MG/1
2.5 TABLET ORAL DAILY
Status: DISCONTINUED | OUTPATIENT
Start: 2019-07-07 | End: 2019-07-09 | Stop reason: HOSPADM

## 2019-07-07 RX ADMIN — ACETAMINOPHEN 1000 MG: 500 TABLET ORAL at 22:25

## 2019-07-07 RX ADMIN — ACETAMINOPHEN 1000 MG: 500 TABLET ORAL at 17:59

## 2019-07-07 RX ADMIN — PANTOPRAZOLE SODIUM 40 MG: 40 TABLET, DELAYED RELEASE ORAL at 17:59

## 2019-07-07 RX ADMIN — NEBIVOLOL HYDROCHLORIDE 2.5 MG: 2.5 TABLET ORAL at 14:48

## 2019-07-07 RX ADMIN — SODIUM CHLORIDE 40 MG: 9 INJECTION, SOLUTION INTRAMUSCULAR; INTRAVENOUS; SUBCUTANEOUS at 10:09

## 2019-07-07 RX ADMIN — ACETAMINOPHEN 1000 MG: 500 TABLET ORAL at 10:09

## 2019-07-07 RX ADMIN — ENOXAPARIN SODIUM 40 MG: 40 INJECTION SUBCUTANEOUS at 10:11

## 2019-07-07 RX ADMIN — ACETAMINOPHEN 1000 MG: 500 TABLET ORAL at 05:10

## 2019-07-07 RX ADMIN — ALVIMOPAN 12 MG: 12 CAPSULE ORAL at 10:11

## 2019-07-07 NOTE — PROGRESS NOTES
Bedside and Verbal shift change report given to Gary Silva (oncoming nurse) by Karl Contreras (offgoing nurse). Report included the following information SBAR, Kardex, Intake/Output and MAR. Passing flatus, no BM, intermittently feels queasy. Not eager to ambulate, requires encouragement and persistence to ambulate beyond the bathroom. Did ambulate in hallway today, has good BLE strength, does not want PRN pain medications as \"they put me to sleep. \"  Poor appetite, ate about 10% of each meal.

## 2019-07-07 NOTE — PROGRESS NOTES
CRS POD#4 s/p lap right colectomy    Tolerating solid diet, passing some flatus but no BMs.     /50   Pulse 79   Temp 97.7 °F (36.5 °C)   Resp 18   Ht 5' 4\" (1.626 m)   Wt 74.8 kg (165 lb)   SpO2 95%   BMI 28.32 kg/m²     NAD, AAOx3  Abd soft, approp TTP  Incisions c/d/i    Plan  - oob at  - oral pain meds  - soft diet

## 2019-07-07 NOTE — PROGRESS NOTES
met with patient and patient's family in general surgery. Patient and patient's family talked about patient feeling better today. Plan of care is to follow up as needed with patient's family and patient. [address] Paging Service [phone]

## 2019-07-07 NOTE — PROGRESS NOTES
Hospitalist Progress Note    NAME: Isis Maya   :  1932   MRN:  [id number]       Assessment / Plan:    Acute Blood Loss Anemia POA  AdenoCA of colon  Leukocytosis, likely postsurgical stress reaction, trending down  -baseline Hgb in th 10-11 range, hb stable ~ 8  -positive FOBT and dark stool at home  -s/p EGD showing Duodenal Polyp and Colonoscopy ,showing Colonic Mass, biopsy shows adeno CA  -on po PPI  -CRS consulted, S/P laparoscopic right colectomy 7/3, pod # 3, passing gas, start on diet today  -appreciate oncology consult     Hypertension with Hypertensive Urgency on presentation   Sinus Bradycardia: resolved  -will start low dose Bystolic    Hypokalemia  -replaced     Overweight   Body mass index is 28.32 kg/m².     Code status: DNR  Prophylaxis: SCD's  Recommended Disposition: Home w/Family     Subjective:     Chief Complaint / Reason for Physician Visit: follow-up weakness, anemia  Patient seen for follow-up  'I am doing better, but still weak\"    Review of Systems:  Symptom Y/N Comments  Symptom Y/N Comments   Fever/Chills    Chest Pain n    Poor Appetite    Edema     Cough    Abdominal Pain n    Sputum    Joint Pain     SOB/MCPHERSON n resolved  Pruritis/Rash     Nausea/vomit y No vomiting  Tolerating PT/OT     Diarrhea    Tolerating Diet     Constipation    Other       Could NOT obtain due to:      Objective:     VITALS:   Last 24hrs VS reviewed since prior progress note.  Most recent are:  Patient Vitals for the past 24 hrs:   Temp Pulse Resp BP SpO2   19 0804 98.4 °F (36.9 °C) 80 18 114/50 96 %   19 0505 98.9 °F (37.2 °C) 87 18 159/49 94 %   19 2241 97.8 °F (36.6 °C) 89 16 176/52 92 %   19 1914 97.7 °F (36.5 °C) 76 16 168/55 93 %   19 1508 97.9 °F (36.6 °C) 79 16 166/46 96 %   19 1141 98 °F (36.7 °C) 76 16 166/53 92 %       Intake/Output Summary (Last 24 hours) at 2019 1130  Last data filed at 2019 1818  Gross per 24 hour   Intake 440 ml   Output 200 ml Net 240 ml        PHYSICAL EXAM:  General: Alert, cooperative, no acute distress    EENT:  Anicteric sclerae. MMM  Resp:  CTA bilaterally. No accessory muscle use  CV:  Regular  rhythm,  No edema  GI:  Soft, surgical wounds look clean  Neurologic:  Alert and oriented X 3, normal speech, cn 2-12 grossly intact  Psych:             Not anxious nor agitated  Skin:  No rashes, still Pale. No jaundice    Reviewed most current lab test results and cultures  YES  Reviewed most current radiology test results   YES  Review and summation of old records today    NO  Reviewed patient's current orders and MAR    YES  PMH/SH reviewed - no change compared to H&P  ________________________________________________________________________  Care Plan discussed with:    Comments   Patient x    Family      RN x    Care Manager     Consultant                        Multidiciplinary team rounds were held today with , nursing, pharmacist and clinical coordinator. Patient's plan of care was discussed; medications were reviewed and discharge planning was addressed. ________________________________________________________________________  Total NON critical care TIME: 25 Minutes    Total CRITICAL CARE TIME Spent:   Minutes non procedure based      Comments   >50% of visit spent in counseling and coordination of care x    ________________________________________________________________________  George Dang MD     Procedures: see electronic medical records for all procedures/Xrays and details which were not copied into this note but were reviewed prior to creation of Plan. LABS:  I reviewed today's most current labs and imaging studies.   Pertinent labs include:  Recent Labs     07/06/19  0452   WBC 11.8*   HGB 8.1*   HCT 27.1*        Recent Labs     07/06/19  0452      K 3.6      CO2 28   GLU 89   BUN 10   CREA 0.68   CA 8.6       Signed: George Dang MD

## 2019-07-07 NOTE — PROGRESS NOTES
General Surgery End of Shift Nursing Note    Bedside shift change report given to Helen MORRELL (oncoming nurse) by Timothy Carrillo (offgoing nurse). Report included the following information SBAR, Kardex, MAR, Accordion, Recent Results and Med Rec Status.  Patient refused 2300 dose of tylenol because she believed tramadol was \"strong enough that she didn't need that dose of tylenol\"

## 2019-07-08 ENCOUNTER — APPOINTMENT (OUTPATIENT)
Dept: GENERAL RADIOLOGY | Age: 84
DRG: 330 | End: 2019-07-08
Attending: HOSPITALIST
Payer: MEDICARE

## 2019-07-08 LAB
ALBUMIN SERPL-MCNC: 2.6 G/DL (ref 3.5–5)
ALBUMIN/GLOB SERPL: 0.5 {RATIO} (ref 1.1–2.2)
ALP SERPL-CCNC: 161 U/L (ref 45–117)
ALT SERPL-CCNC: 87 U/L (ref 12–78)
ANION GAP SERPL CALC-SCNC: 9 MMOL/L (ref 5–15)
AST SERPL-CCNC: 120 U/L (ref 15–37)
BASOPHILS # BLD: 0 K/UL (ref 0–0.1)
BASOPHILS NFR BLD: 0 % (ref 0–1)
BILIRUB SERPL-MCNC: 0.5 MG/DL (ref 0.2–1)
BUN SERPL-MCNC: 10 MG/DL (ref 6–20)
BUN/CREAT SERPL: 11 (ref 12–20)
CALCIUM SERPL-MCNC: 9.1 MG/DL (ref 8.5–10.1)
CHLORIDE SERPL-SCNC: 100 MMOL/L (ref 97–108)
CO2 SERPL-SCNC: 29 MMOL/L (ref 21–32)
CREAT SERPL-MCNC: 0.9 MG/DL (ref 0.55–1.02)
DIFFERENTIAL METHOD BLD: ABNORMAL
EOSINOPHIL # BLD: 0.2 K/UL (ref 0–0.4)
EOSINOPHIL NFR BLD: 2 % (ref 0–7)
ERYTHROCYTE [DISTWIDTH] IN BLOOD BY AUTOMATED COUNT: 23.7 % (ref 11.5–14.5)
GLOBULIN SER CALC-MCNC: 4.9 G/DL (ref 2–4)
GLUCOSE SERPL-MCNC: 130 MG/DL (ref 65–100)
HCT VFR BLD AUTO: 28.9 % (ref 35–47)
HGB BLD-MCNC: 8.9 G/DL (ref 11.5–16)
IMM GRANULOCYTES # BLD AUTO: 0.1 K/UL (ref 0–0.04)
IMM GRANULOCYTES NFR BLD AUTO: 1 % (ref 0–0.5)
LYMPHOCYTES # BLD: 1.8 K/UL (ref 0.8–3.5)
LYMPHOCYTES NFR BLD: 20 % (ref 12–49)
MAGNESIUM SERPL-MCNC: 2 MG/DL (ref 1.6–2.4)
MCH RBC QN AUTO: 22.3 PG (ref 26–34)
MCHC RBC AUTO-ENTMCNC: 30.8 G/DL (ref 30–36.5)
MCV RBC AUTO: 72.3 FL (ref 80–99)
MONOCYTES # BLD: 0.9 K/UL (ref 0–1)
MONOCYTES NFR BLD: 9 % (ref 5–13)
NEUTS SEG # BLD: 6.5 K/UL (ref 1.8–8)
NEUTS SEG NFR BLD: 68 % (ref 32–75)
NRBC # BLD: 0 K/UL (ref 0–0.01)
NRBC BLD-RTO: 0 PER 100 WBC
PLATELET # BLD AUTO: 328 K/UL (ref 150–400)
PMV BLD AUTO: 10 FL (ref 8.9–12.9)
POTASSIUM SERPL-SCNC: 3.2 MMOL/L (ref 3.5–5.1)
PROT SERPL-MCNC: 7.5 G/DL (ref 6.4–8.2)
RBC # BLD AUTO: 4 M/UL (ref 3.8–5.2)
SODIUM SERPL-SCNC: 138 MMOL/L (ref 136–145)
WBC # BLD AUTO: 9.5 K/UL (ref 3.6–11)

## 2019-07-08 PROCEDURE — 36415 COLL VENOUS BLD VENIPUNCTURE: CPT

## 2019-07-08 PROCEDURE — 74011250636 HC RX REV CODE- 250/636: Performed by: HOSPITALIST

## 2019-07-08 PROCEDURE — 74011250636 HC RX REV CODE- 250/636: Performed by: SURGERY

## 2019-07-08 PROCEDURE — 65270000029 HC RM PRIVATE

## 2019-07-08 PROCEDURE — 74011250637 HC RX REV CODE- 250/637: Performed by: SURGERY

## 2019-07-08 PROCEDURE — 85025 COMPLETE CBC W/AUTO DIFF WBC: CPT

## 2019-07-08 PROCEDURE — 74011250637 HC RX REV CODE- 250/637: Performed by: HOSPITALIST

## 2019-07-08 PROCEDURE — 71045 X-RAY EXAM CHEST 1 VIEW: CPT

## 2019-07-08 PROCEDURE — 80053 COMPREHEN METABOLIC PANEL: CPT

## 2019-07-08 PROCEDURE — 94760 N-INVAS EAR/PLS OXIMETRY 1: CPT

## 2019-07-08 PROCEDURE — 83735 ASSAY OF MAGNESIUM: CPT

## 2019-07-08 RX ORDER — POTASSIUM CHLORIDE 750 MG/1
40 TABLET, FILM COATED, EXTENDED RELEASE ORAL
Status: COMPLETED | OUTPATIENT
Start: 2019-07-08 | End: 2019-07-08

## 2019-07-08 RX ORDER — FUROSEMIDE 10 MG/ML
20 INJECTION INTRAMUSCULAR; INTRAVENOUS ONCE
Status: COMPLETED | OUTPATIENT
Start: 2019-07-08 | End: 2019-07-08

## 2019-07-08 RX ADMIN — HYDRALAZINE HYDROCHLORIDE 10 MG: 20 INJECTION INTRAMUSCULAR; INTRAVENOUS at 03:14

## 2019-07-08 RX ADMIN — POTASSIUM CHLORIDE 40 MEQ: 750 TABLET, FILM COATED, EXTENDED RELEASE ORAL at 22:50

## 2019-07-08 RX ADMIN — FUROSEMIDE 20 MG: 10 INJECTION, SOLUTION INTRAMUSCULAR; INTRAVENOUS at 11:25

## 2019-07-08 RX ADMIN — PANTOPRAZOLE SODIUM 40 MG: 40 TABLET, DELAYED RELEASE ORAL at 16:39

## 2019-07-08 RX ADMIN — ACETAMINOPHEN 1000 MG: 500 TABLET ORAL at 16:39

## 2019-07-08 RX ADMIN — ENOXAPARIN SODIUM 40 MG: 40 INJECTION SUBCUTANEOUS at 10:28

## 2019-07-08 RX ADMIN — ACETAMINOPHEN 1000 MG: 500 TABLET ORAL at 22:50

## 2019-07-08 RX ADMIN — TRAMADOL HYDROCHLORIDE 25 MG: 50 TABLET, FILM COATED ORAL at 14:12

## 2019-07-08 RX ADMIN — ACETAMINOPHEN 1000 MG: 500 TABLET ORAL at 10:28

## 2019-07-08 RX ADMIN — ONDANSETRON 4 MG: 2 INJECTION INTRAMUSCULAR; INTRAVENOUS at 14:19

## 2019-07-08 RX ADMIN — NEBIVOLOL HYDROCHLORIDE 2.5 MG: 2.5 TABLET ORAL at 10:28

## 2019-07-08 RX ADMIN — PANTOPRAZOLE SODIUM 40 MG: 40 TABLET, DELAYED RELEASE ORAL at 07:12

## 2019-07-08 RX ADMIN — ACETAMINOPHEN 1000 MG: 500 TABLET ORAL at 05:26

## 2019-07-08 NOTE — PROGRESS NOTES
General Surgery End of Shift Nursing Note    Bedside shift change report given to [address] [place] [address] (oncoming nurse) by Burke Pickard (offgoing nurse). Report included the following information SBAR, Kardex, Intake/Output and MAR. Shift worked:   7a-7p   Summary of shift:    2 BM's today. Appetite still not great but seems to be improving and she does drink her Ensure. Not motivated to ambulate or increase activity, but cooperative with enough support. She feels weaker and more incapable than she appears to be.     Issues for physician to address: none     Number times ambulated in hallway past shift: 2    Number of times OOB to chair past shift: most of the day    Pain Management:  Current medication: will only take ordered Tylenol    Mattie Jamil

## 2019-07-08 NOTE — PROGRESS NOTES
Occupational Therapy:  07/08/19    Chart reviewed, spoke to RN who cleared patient for therapy, attempted to see patient. She is received in bed with daughter at bedside. Patient is easily aroused and politely declined participation at this time, reporting increased fatigue after spending 12 hours in the chair yesterday. Education provided on role of therapy in acute setting in order to return to home safely, however patient continued to refuse. Of note, RN and patient's daughter reporting patient with increased fatigue and decreased activity tolerance. Patient no longer feels she is safe to return with [facility] therapy. Daughter reports she and her  work, so patient will be alone during the day. Patient may benefit from rehab prior to return home. Will re-attempt later today for OT treatment. 1350: Second attempt to work with patient this date. She is received in bed with family at bedside. Patient and daughter reporting she has just returned to bed after sitting up in chair for over an hour. Patient is unwilling to get back up with therapy at this time due to fatigue. Will continue to follow.      Thank you,  Raji Bender, OTR/L

## 2019-07-08 NOTE — PROGRESS NOTES
Plan:  -SNF for rehab      12:12PM  CM in to pt's room to discuss d/c planning. Pt resting. CM spoke with pt's dtr, [facility] (primary mPOA), at bedside. Discussed need for additional supervision as pt lives with dtr and SHERIF who work during the day. CM educated on insurance benefits for SNF. Pt also has LTC policy for ongoing needs. CM provided SNF list. Dtr would like to visit facilities. CM requested she visit this afternoon, choose top 2-3 choices and then call CM with decisions by the end of the day. Dtr verbalized agreement. 3:25PM  PC from pt's dtr, stating [facility] is her first choice for rehab. FOC on chart. Referral sent through [facility]ink. CM will continue to follow and assist with d/c planning.      Janet Aparicio MSW  Care Manager

## 2019-07-08 NOTE — PROGRESS NOTES
Bedside and Verbal shift change report given to Elmer Panda RN (oncoming nurse) by [facility], RN (offgoing nurse). Report included the following information SBAR, Kardex, Intake/Output, MAR, Recent Results and Cardiac Rhythm NSR.     1423- Spoke with Dr. Malena Darden- Patient is not feeling well and is shaking really bad. Gave Ultram for pain. Patient vomited. Zofran for nausea. MD to write new orders. 1520- Patient is no longer shaking. Patient is no longer feeling nauseated but complains of not \"feeling well\". General Surgery End of Shift Nursing Note    Bedside shift change report given to Rudy Porter RN (oncoming nurse) by Elmer Panda RN (offgoing nurse). Report included the following information SBAR, Kardex, MAR and Recent Results. Shift worked:   7a-3p   Summary of shift:    Patient has complaints of not \"feeling well\". Around 2pm patient had complaints of vigorous shaking and pain. Vital signs stable. Patient given Ultram for pain. Within two minutes the patient started to vomit. Zofran given. MD notified. New orders obtained. Issues for physician to address:   N/A     Number times ambulated in hallway past shift: 0      Number of times OOB to chair past shift: 2      Pain Management:  Current medication: Ultra,  Patient states pain is manageable on current pain medication: YES    GI:    Current diet:  DIET REGULAR Low Fiber  DIET NUTRITIONAL SUPPLEMENTS Breakfast, Lunch; Ensure [place] current diet: YES  Passing flatus: YES  Last Bowel Movement: today   Appearance: Loose and watery    Respiratory:    Incentive Spirometer at bedside: YES  Patient instructed on use: YES    Patient Safety:    Falls Score: 1  Bed Alarm On? No  Sitter?  No    Pérez Hess RN

## 2019-07-08 NOTE — PROGRESS NOTES
Hospitalist Progress Note    NAME: Isis Butler   :  1932   MRN:  [id number]       Assessment / Plan:    Acute Blood Loss Anemia POA  AdenoCA of colon  Leukocytosis, likely postsurgical stress reaction, trending down  -baseline Hgb in th 10-11 range, hb stable ~ 8  -positive FOBT and dark stool at home  -s/p EGD showing Duodenal Polyp and Colonoscopy ,showing Colonic Mass, biopsy shows adeno CA  -on po PPI  -CRS consulted, S/P laparoscopic right colectomy 7/3, pod # 4, had BM last night, will discharge if cleared by surgery today.  -appreciate oncology consult     Hypertension with Hypertensive Urgency on presentation   Sinus Bradycardia: resolved  -restarted low dose Bystolic    Hypokalemia  -replace     Overweight   Body mass index is 28.32 kg/m².     Code status: DNR  Prophylaxis: SCD's  Recommended Disposition: Home w/Family     Subjective:     Chief Complaint / Reason for Physician Visit: follow-up weakness, anemia  Patient seen for follow-up  'I am doing better, appetite still not great, had 2 BM last night\"    Review of Systems:  Symptom Y/N Comments  Symptom Y/N Comments   Fever/Chills    Chest Pain n    Poor Appetite y   Edema     Cough    Abdominal Pain n    Sputum    Joint Pain     SOB/MCPHERSON n   Pruritis/Rash     Nausea/vomit    Tolerating PT/OT y    Diarrhea    Tolerating Diet y    Constipation    Other       Could NOT obtain due to:      Objective:     VITALS:   Last 24hrs VS reviewed since prior progress note.  Most recent are:  Patient Vitals for the past 24 hrs:   Temp Pulse Resp BP SpO2   19 0347 99.4 °F (37.4 °C) 78 16 169/48 93 %   19 0339 98.2 °F (36.8 °C) 76 18 182/62 90 %   19 0314    182/64    19 0030    164/48    19 2315 98.5 °F (36.9 °C) 73 18 178/52 93 %   19 98.2 °F (36.8 °C) 75 18 168/52 92 %   19 1447 97.7 °F (36.5 °C) 79 18 146/50 95 %   19 0804 98.4 °F (36.9 °C) 80 18 114/50 96 %       Intake/Output Summary (Last 24 hours) at 7/8/2019 0651  Last data filed at 7/8/2019 0049  Gross per 24 hour   Intake 1020 ml   Output 1200 ml   Net -180 ml        PHYSICAL EXAM:  General: Alert, cooperative, no acute distress    EENT:  Anicteric sclerae. MMM  Resp:  CTA bilaterally. No accessory muscle use  CV:  Regular  rhythm,  No edema  GI:  Soft, surgical wounds look clean  Neurologic:  Alert and oriented X 3, normal speech, cn 2-12 grossly intact  Psych:             Not anxious nor agitated  Skin:  No rashes, still Pale. No jaundice    Reviewed most current lab test results and cultures  YES  Reviewed most current radiology test results   YES  Review and summation of old records today    NO  Reviewed patient's current orders and MAR    YES  PMH/SH reviewed - no change compared to H&P  ________________________________________________________________________  Care Plan discussed with:    Comments   Patient x    Family      RN x    Care Manager     Consultant                        Multidiciplinary team rounds were held today with , nursing, pharmacist and clinical coordinator. Patient's plan of care was discussed; medications were reviewed and discharge planning was addressed. ________________________________________________________________________  Total NON critical care TIME: 25 Minutes    Total CRITICAL CARE TIME Spent:   Minutes non procedure based      Comments   >50% of visit spent in counseling and coordination of care x    ________________________________________________________________________  Angela Sy MD     Procedures: see electronic medical records for all procedures/Xrays and details which were not copied into this note but were reviewed prior to creation of Plan. LABS:  I reviewed today's most current labs and imaging studies.   Pertinent labs include:  Recent Labs     07/08/19  0357 07/06/19  0452   WBC 9.5 11.8*   HGB 8.9* 8.1*   HCT 28.9* 27.1*    248     Recent Labs     07/06/19  0452   NA 138   K 3.6      CO2 28   GLU 89   BUN 10   CREA 0.68   CA 8.6       Signed: Renetta Woods MD

## 2019-07-08 NOTE — PROGRESS NOTES
General Surgery End of Shift Nursing Note    Bedside shift change report given to [address] Reyna (oncoming nurse) by Taye Keene (offgoing nurse). Report included the following information SBAR, Kardex, Intake/Output, MAR and Recent Results.         Jose Tobar

## 2019-07-08 NOTE — PROGRESS NOTES
Physical Therapy Note     Chart reviewed, spoke to RN who cleared patient for therapy, attempted to see patient. She is received in bed with daughter at bedside. Patient is easily aroused and politely declined participation at this time, reporting increased fatigue after spending 12 hours in the chair yesterday. Education provided on role of therapy in acute setting in order to return to home safely, however patient continued to refuse.      Of note, RN and patient's daughter reporting patient with increased fatigue and decreased activity tolerance. Patient no longer feels she is safe to return with [facility] therapy. Daughter reports she and her  work, so patient will be alone during the day. Patient may benefit from rehab prior to return home. Will re-attempt later today for PT treatment. 1350 Patient and daughter report patient sat up in recliner for 2 hours prior to PT/OT return and no reports she is too fatigued to do anymore today.  Will defer.      Thank you,  Evie WALLACET

## 2019-07-08 NOTE — PROGRESS NOTES
Hematology Oncology Progress Note       Follow up for: colon cancer  Chart notes reviewed since last visit. Case discussed with following: cody    Patient complains of the following: sleepy, In good spirits. Additional concerns noted by the staff:     Patient Vitals for the past 24 hrs:   BP Temp Pulse Resp SpO2   07/08/19 0844 145/46 98.9 °F (37.2 °C) 79 16 95 %   07/08/19 0347 169/48 99.4 °F (37.4 °C) 78 16 93 %   07/08/19 0339 182/62 98.2 °F (36.8 °C) 76 18 90 %   07/08/19 0314 182/64       07/08/19 0030 164/48       07/07/19 2315 178/52 98.5 °F (36.9 °C) 73 18 93 %   07/07/19 2014 168/52 98.2 °F (36.8 °C) 75 18 92 %   07/07/19 1447 146/50 97.7 °F (36.5 °C) 79 18 95 %       ROS: negative for 11 organ systems except as noted. Physical Examination:  Gen NAD  CV reg  Lungs clear  abd benign    Labs:  Recent Results (from the past 24 hour(s))   CBC WITH AUTOMATED DIFF    Collection Time: 07/08/19  3:57 AM   Result Value Ref Range    WBC 9.5 3.6 - 11.0 K/uL    RBC 4.00 3.80 - 5.20 M/uL    HGB 8.9 (L) 11.5 - 16.0 g/dL    HCT 28.9 (L) 35.0 - 47.0 %    MCV 72.3 (L) 80.0 - 99.0 FL    MCH 22.3 (L) 26.0 - 34.0 PG    MCHC 30.8 30.0 - 36.5 g/dL    RDW 23.7 (H) 11.5 - 14.5 %    PLATELET 229 563 - 362 K/uL    MPV 10.0 8.9 - 12.9 FL    NRBC 0.0 0  WBC    ABSOLUTE NRBC 0.00 0.00 - 0.01 K/uL    NEUTROPHILS 68 32 - 75 %    LYMPHOCYTES 20 12 - 49 %    MONOCYTES 9 5 - 13 %    EOSINOPHILS 2 0 - 7 %    BASOPHILS 0 0 - 1 %    IMMATURE GRANULOCYTES 1 (H) 0.0 - 0.5 %    ABS. NEUTROPHILS 6.5 1.8 - 8.0 K/UL    ABS. LYMPHOCYTES 1.8 0.8 - 3.5 K/UL    ABS. MONOCYTES 0.9 0.0 - 1.0 K/UL    ABS. EOSINOPHILS 0.2 0.0 - 0.4 K/UL    ABS. BASOPHILS 0.0 0.0 - 0.1 K/UL    ABS. IMM. GRANS. 0.1 (H) 0.00 - 0.04 K/UL    DF AUTOMATED         Assessment and Plan:   Colon cancer - s/p resection. Will followup on path when available. questions answered re surgery and followup.  Dr. Paolo Calabrese can see in 3-4 weeks for followup of anemia and then q 3 months for cancer followup if desired.    -HBG improved

## 2019-07-08 NOTE — PROGRESS NOTES
Music Therapy Assessment  [place] [facility] [id number]     6/6/1932  80 y.o.  female    Patient Telephone Number: [phone] (home)   Yarsani Affiliation:    Language: English   Patient Active Problem List    Diagnosis Date Noted    S/P right colectomy 07/04/2019    Anemia 06/28/2019        Date: 7/8/2019            Total Time (in minutes): 5          MRM 2 GENERAL SURGERY    Mental Status:   [x] Alert [  ] Pamila Links [  ]  Confused  [  ] Minimally responsive  [  ] Sleeping    Communication Status: [  ] Impaired Speech [  ] Nonverbal -N/A    Physical Status:   [  ] Oxygen in use  [  ] Hard of Hearing [  ] Vision Impaired  [  ] Ambulatory  [  ] Ambulatory with assistance [  ] Non-ambulatory -N/A    Music Preferences, Background: N/A: Please see Session Observations below. Clinical Problem to be addressed: Support relaxation, self-expression. Goal(s) met in session: N/A: Please see Session Observations below. Physical/Pain management (Scale of 1-10):    Pre-session rating ___________    Post-session rating __________   [  ] Increased relaxation   [  ] Affected breathing patterns  [  ] Decreased muscle tension   [  ] Decreased agitation  [  ] Affected heart rate    [  ] Increased alertness     Emotional/Psychological:  [  ] Increased self-expression   [  ] Decreased aggressive behavior   [  ] Decreased feelings of stress  [  ] Discussed healthy coping skills     [  ] Improved mood    [  ] Decreased withdrawn behavior     Social:  [  ] Decreased feelings of isolation/loneliness [  ] Positive social interaction   [  ] Provided support and/or comfort for family/friends    Spiritual:  [  ] Spiritual support    [  ] Expressed peace  [  ] Expressed ariela    [  ] Discussed beliefs    Techniques Utilized (Check all that apply): N/A: Please see Session Observations below.   [  ] Procedural support MT [  ] Music for relaxation [  ] Patient preferred music  [  ] Brenda analysis  [  ] Danielle Chapin choice  [  ] Music for validation  [  ] Entrainment  [  ] Movement to music [  ] Guided visualization  [  ] Daxa Shames  [  ] Patient instrument playing [  ] Savanna Alfred writing  [  ] Tommye Quivers along   [  ] Abeba Knights  [  ] Sensory stimulation  [  ] Active Listening  [  ] Music for spiritual support [  ] Making of CDs as gifts    Session Observations:  New referral. Patient (pt) was alert lying in bed under blankets with her upper body shaking. Her nurse Bicske was at bedside. This music therapist (MT) introduced self and offered music therapy. Pt declined this and her nurse said now was likely not the best time. MT offered to follow up later this afternoon to see if that was a better time but pt declined this. MT expressed understanding and will follow next Monday upon next returning to the hospital as able and if pt has not been discharged at this point.     Zohaib Colbert MT-BC (Music Therapist-Board Certified)  Spiritual Care Department  Referral-based service

## 2019-07-09 VITALS
DIASTOLIC BLOOD PRESSURE: 48 MMHG | WEIGHT: 165 LBS | HEART RATE: 71 BPM | SYSTOLIC BLOOD PRESSURE: 148 MMHG | RESPIRATION RATE: 16 BRPM | TEMPERATURE: 98.1 F | BODY MASS INDEX: 28.17 KG/M2 | HEIGHT: 64 IN | OXYGEN SATURATION: 92 %

## 2019-07-09 PROBLEM — I10 HTN (HYPERTENSION): Status: ACTIVE | Noted: 2019-07-09

## 2019-07-09 PROBLEM — C18.9 COLON CANCER (HCC): Status: ACTIVE | Noted: 2019-07-09

## 2019-07-09 LAB
ALBUMIN SERPL-MCNC: 1.9 G/DL (ref 3.5–5)
ALBUMIN/GLOB SERPL: 0.5 {RATIO} (ref 1.1–2.2)
ALP SERPL-CCNC: 115 U/L (ref 45–117)
ALT SERPL-CCNC: 89 U/L (ref 12–78)
ANION GAP SERPL CALC-SCNC: 5 MMOL/L (ref 5–15)
AST SERPL-CCNC: 85 U/L (ref 15–37)
BASOPHILS # BLD: 0 K/UL (ref 0–0.1)
BASOPHILS NFR BLD: 0 % (ref 0–1)
BILIRUB SERPL-MCNC: 0.3 MG/DL (ref 0.2–1)
BUN SERPL-MCNC: 14 MG/DL (ref 6–20)
BUN/CREAT SERPL: 18 (ref 12–20)
CALCIUM SERPL-MCNC: 8.5 MG/DL (ref 8.5–10.1)
CHLORIDE SERPL-SCNC: 105 MMOL/L (ref 97–108)
CO2 SERPL-SCNC: 30 MMOL/L (ref 21–32)
CREAT SERPL-MCNC: 0.78 MG/DL (ref 0.55–1.02)
DIFFERENTIAL METHOD BLD: ABNORMAL
EOSINOPHIL # BLD: 0 K/UL (ref 0–0.4)
EOSINOPHIL NFR BLD: 0 % (ref 0–7)
ERYTHROCYTE [DISTWIDTH] IN BLOOD BY AUTOMATED COUNT: 24.6 % (ref 11.5–14.5)
GLOBULIN SER CALC-MCNC: 4.1 G/DL (ref 2–4)
GLUCOSE SERPL-MCNC: 94 MG/DL (ref 65–100)
HCT VFR BLD AUTO: 29.1 % (ref 35–47)
HGB BLD-MCNC: 8.8 G/DL (ref 11.5–16)
IMM GRANULOCYTES # BLD AUTO: 0 K/UL (ref 0–0.04)
IMM GRANULOCYTES NFR BLD AUTO: 0 % (ref 0–0.5)
LYMPHOCYTES # BLD: 1 K/UL (ref 0.8–3.5)
LYMPHOCYTES NFR BLD: 10 % (ref 12–49)
MCH RBC QN AUTO: 22.4 PG (ref 26–34)
MCHC RBC AUTO-ENTMCNC: 30.2 G/DL (ref 30–36.5)
MCV RBC AUTO: 74 FL (ref 80–99)
MONOCYTES # BLD: 0.6 K/UL (ref 0–1)
MONOCYTES NFR BLD: 6 % (ref 5–13)
NEUTS SEG # BLD: 8.2 K/UL (ref 1.8–8)
NEUTS SEG NFR BLD: 84 % (ref 32–75)
NRBC # BLD: 0 K/UL (ref 0–0.01)
NRBC BLD-RTO: 0 PER 100 WBC
PLATELET # BLD AUTO: 355 K/UL (ref 150–400)
PMV BLD AUTO: 10.1 FL (ref 8.9–12.9)
POTASSIUM SERPL-SCNC: 3.8 MMOL/L (ref 3.5–5.1)
PROT SERPL-MCNC: 6 G/DL (ref 6.4–8.2)
RBC # BLD AUTO: 3.93 M/UL (ref 3.8–5.2)
RBC MORPH BLD: ABNORMAL
SODIUM SERPL-SCNC: 140 MMOL/L (ref 136–145)
WBC # BLD AUTO: 9.8 K/UL (ref 3.6–11)

## 2019-07-09 PROCEDURE — 36415 COLL VENOUS BLD VENIPUNCTURE: CPT

## 2019-07-09 PROCEDURE — 85025 COMPLETE CBC W/AUTO DIFF WBC: CPT

## 2019-07-09 PROCEDURE — 74011250636 HC RX REV CODE- 250/636: Performed by: SURGERY

## 2019-07-09 PROCEDURE — 74011250637 HC RX REV CODE- 250/637: Performed by: HOSPITALIST

## 2019-07-09 PROCEDURE — 74011250637 HC RX REV CODE- 250/637: Performed by: SURGERY

## 2019-07-09 PROCEDURE — 80053 COMPREHEN METABOLIC PANEL: CPT

## 2019-07-09 PROCEDURE — 97116 GAIT TRAINING THERAPY: CPT | Performed by: PHYSICAL THERAPIST

## 2019-07-09 PROCEDURE — 94760 N-INVAS EAR/PLS OXIMETRY 1: CPT

## 2019-07-09 PROCEDURE — 77030027138 HC INCENT SPIROMETER -A

## 2019-07-09 RX ORDER — NEBIVOLOL 2.5 MG/1
2.5 TABLET ORAL DAILY
Qty: 30 TAB | Refills: 1 | Status: SHIPPED | OUTPATIENT
Start: 2019-07-09 | End: 2019-09-05 | Stop reason: SDUPTHER

## 2019-07-09 RX ORDER — TRAMADOL HYDROCHLORIDE 50 MG/1
25 TABLET ORAL
Qty: 20 TAB | Refills: 0 | Status: SHIPPED | OUTPATIENT
Start: 2019-07-09 | End: 2019-07-14

## 2019-07-09 RX ORDER — PANTOPRAZOLE SODIUM 40 MG/1
40 TABLET, DELAYED RELEASE ORAL
Qty: 60 TAB | Refills: 1 | Status: SHIPPED | OUTPATIENT
Start: 2019-07-09 | End: 2019-09-05 | Stop reason: SDUPTHER

## 2019-07-09 RX ORDER — FOLIC ACID 1 MG/1
1 TABLET ORAL DAILY
Qty: 30 TAB | Refills: 1 | Status: SHIPPED | OUTPATIENT
Start: 2019-07-09 | End: 2019-09-05 | Stop reason: SDUPTHER

## 2019-07-09 RX ORDER — LANOLIN ALCOHOL/MO/W.PET/CERES
325 CREAM (GRAM) TOPICAL
Qty: 30 TAB | Refills: 1 | Status: SHIPPED | OUTPATIENT
Start: 2019-07-09

## 2019-07-09 RX ADMIN — ACETAMINOPHEN 1000 MG: 500 TABLET ORAL at 06:15

## 2019-07-09 RX ADMIN — PANTOPRAZOLE SODIUM 40 MG: 40 TABLET, DELAYED RELEASE ORAL at 09:29

## 2019-07-09 RX ADMIN — ENOXAPARIN SODIUM 40 MG: 40 INJECTION SUBCUTANEOUS at 09:31

## 2019-07-09 RX ADMIN — NEBIVOLOL HYDROCHLORIDE 2.5 MG: 2.5 TABLET ORAL at 09:30

## 2019-07-09 RX ADMIN — ACETAMINOPHEN 1000 MG: 500 TABLET ORAL at 11:42

## 2019-07-09 NOTE — PROGRESS NOTES
Problem: Mobility Impaired (Adult and Pediatric)  Goal: *Acute Goals and Plan of Care (Insert Text)  Description  Physical Therapy Goals  Initiated 7/2/2019  Goals reassessed 7/4/2019 and remain appropriate    1. Patient will transfer from bed to chair and chair to bed with independence using the least restrictive device within 7 day(s). 2.  Patient will ambulate with independence for 250 feet with the least restrictive device within 7 day(s). 3.  Patient will ascend/descend 5 stairs with 1 handrail(s) with supervision/set-up within 7 day(s). Outcome: Progressing Towards Goal    PHYSICAL THERAPY TREATMENT  Patient: Isis Mota (80 y.o. female)  Date: 7/9/2019  Diagnosis: Anemia [D64.9] S/P right colectomy  Procedure(s) (LRB):  COLON RESECTION LAPAROSCOPIC RIGHT (N/A) 6 Days Post-Op  Precautions:    Chart, physical therapy assessment, plan of care and goals were reviewed. ASSESSMENT:  Based on the objective data described below, the patient presents with Contact guard assistance level overall for transfers. Gait training completed at Contact guard assistance, 40 feet and using a rolling walker. Patient used the RW and ambulated into the bathroom, toileted with supervision, and then ambulated 40 feet. Returned to the chair. Gait is slow. Steady. The following are barriers to independence while in acute care:   -Cognitive and/or behavioral: none. Cooperative and motivated   -Medical condition: functional endurance    -Other:       Prior level of function: Independent ambulation with no AD      PLAN:  Patient continues to benefit from skilled intervention to address the above impairments. Continue treatment per established plan of care. Recommend with staff: Ambulate as tolerated with a RW  Recommend next PT session: Continue with ambulation, strengthening.    Discharge recommendations: Rehab at skilled nursing facility (SNF) (to regain functional baseline patient requires rehab)  If above is not an option then recommend: None    Patient's barriers to discharging home, in addition to above impairments: level of physical assist required to maintain patient safety  none. Equipment recommendations for successful discharge (if) home: none        SUBJECTIVE:   Patient stated ? I am feeling a little bit better. ?    OBJECTIVE DATA SUMMARY:   Critical Behavior:  Neurologic State: Alert  Orientation Level: Oriented X4  Cognition: Appropriate decision making, Follows commands  Safety/Judgement: Awareness of environment  Functional Mobility Training:  Bed Mobility:         Patient is up in the bedside chair and returned to the bedside chair. Transfers:  Sit to Stand: Contact guard assistance;Assist x1;Additional time  Stand to Sit: Contact guard assistance;Assist x1;Additional time                             Balance:  Sitting: Intact  Standing: Impaired  Standing - Static: Constant support;Good  Standing - Dynamic : Fair  Ambulation/Gait Training:  Distance (ft): 40 Feet (ft)  Assistive Device: Walker, rolling  Ambulation - Level of Assistance: Contact guard assistance                       Speed/Maira: Pace decreased (<100 feet/min)                                 Therapeutic Exercises:   Instructed in and has been doing and did 10 x bilat LE ankle pumps and LAQ. Activity Tolerance:   Fair  Please refer to the flowsheet for vital signs taken during this treatment.     After treatment patient left:   Up in chair  Caregiver at bedside  Call light within reach  RN notified     COMMUNICATION/COLLABORATION:   The patient?s plan of care was discussed with: Registered Nurse and NP     Perla King PT   Time Calculation: 16 mins

## 2019-07-09 NOTE — DISCHARGE SUMMARY
Hospitalist Discharge Summary     Patient ID:  June Mayme Charleston  [id number]  80 y.o.  6/6/1932 6/28/2019    PCP on record: None    Admit date: 6/28/2019  Discharge date and time: 7/9/2019    DISCHARGE DIAGNOSIS:    Anemia, Colon Cancer, HTN, leukocytosis, Bradycardia, hypokalemia, Overweight    CONSULTATIONS:  IP CONSULT TO COLORECTAL SURGERY  IP CONSULT TO ONCOLOGY    Excerpted HPI from H&P of Alejandro Noonan MD:  This is a 51-year-old female with past medical history of hypertension is coming to the hospital with chief complaints of generalized weakness, fatigue since the last week.  Patient reports being in her usual state of health until about a week ago when she started not feeling well.  She reports the symptoms really got worse in the last 4 days. Tiffany Mckinnon reports weakness is generalized involving both the arms and legs.  She does not report any abdominal pain, diarrhea, constipation, nausea or vomiting.  She does report mild exertional shortness of breath but no chest pain or palpitations.  Does not report any dysuria or urgency. On arrival to the hospital she was noted to have slight bradycardia, blood pressure was stable.  On lab work she was noted to have a hemoglobin of 7.7 BMP was within normal limits.  Facet of troponin was 0.05.  She had an x-ray which showed no evidence of acute process.  EKG showed sinus bradycardia.  He had a rectal exam in the emergency department which showed evidence of positive occult blood test.  We were asked to admit for work up and evaluation of the above problems. ______________________________________________________________________  DISCHARGE SUMMARY/HOSPITAL COURSE:  for full details see H&P, daily progress notes, labs, consult notes.    Acute Blood Loss Anemia POA  So far stable, will Dc/ on iron and folic acid  AdenoCA of colon  F/U as outpatient CRS and Oncology  Leukocytosis, likely postsurgical stress reaction, trending down to WNL  -baseline Hgb in th 10-11 range, hb stable ~ 8  -positive FOBT and dark stool at home  -s/p EGD showing Duodenal Polyp and Colonoscopy ,showing Colonic Mass, biopsy shows adeno CA  -on po PPI  -CRS consulted, S/P laparoscopic right colectomy 7/3, pod # 4, had BM last night, will discharge if cleared by surgery today.  -appreciate oncology consult  Hypertension with Hypertensive Urgency on presentation  C/w bystolic and Diovan  Sinus Bradycardia: resolved restarted low dose Bystolic  Hypokalemia replace  Overweight  Body mass index is 28.32 kg/m². Code status: DNR  Prophylaxis: SCD's  Recommended Disposition: Home w/Family    D/c To SNF and F/U with PCP, CRS and oncology  _______________________________________________________________________  Patient seen and examined by me on discharge day. Pertinent Findings:  Gen:    Not in distress  Chest: Clear lungs  CVS:   Regular rhythm. No edema  Abd:  Soft, not distended, mild  tender  Neuro:  Alert, GCS 15  _______________________________________________________________________  DISCHARGE MEDICATIONS:   Current Discharge Medication List      START taking these medications    Details   pantoprazole (PROTONIX) 40 mg tablet Take 1 Tab by mouth Before breakfast and dinner. Qty: 60 Tab, Refills: 1      traMADol (ULTRAM) 50 mg tablet Take 0.5 Tabs by mouth every six (6) hours as needed for Pain for up to 5 days. Max Daily Amount: 100 mg. Qty: 20 Tab, Refills: 0    Associated Diagnoses: S/P right colectomy      ferrous sulfate 325 mg (65 mg iron) tablet Take 1 Tab by mouth Daily (before breakfast). Qty: 30 Tab, Refills: 1      folic acid (FOLVITE) 1 mg tablet Take 1 Tab by mouth daily. Qty: 30 Tab, Refills: 1      acetaminophen (TYLENOL) 500 mg tablet Take 2 Tabs by mouth every six (6) hours for 5 days. Qty: 40 Tab, Refills: 0         CONTINUE these medications which have CHANGED    Details   nebivolol (BYSTOLIC) 2.5 mg tablet Take 1 Tab by mouth daily.   Qty: 30 Tab, Refills: 1 CONTINUE these medications which have NOT CHANGED    Details   valsartan-hydroCHLOROthiazide (DIOVAN-HCT) 160-12.5 mg per tablet Take 1 Tab by mouth every other day. STOP taking these medications       valsartan (DIOVAN) 160 mg tablet Comments:   Reason for Stopping:                 Patient Follow Up Instructions: Activity: Activity as tolerated  Diet: Cardiac Diet  Wound Care: Keep wound clean and dry    Follow-up with PCP, CRS, Oncology in 2 weeks.   Follow-up tests/labs none  Follow-up Information     Follow up With Specialties Details Why Contact Info    Kwadwo Crum MD Colon and Rectal Surgery Schedule an appointment as soon as possible for a visit in 2 weeks  [facility]  [phone] [address] [address]  [address] [address] [phone]      None    None (395) Patient stated that they have no PCP          ________________________________________________________________    Risk of deterioration: Moderate    Condition at Discharge:  Stable  __________________________________________________________________    Disposition  SNF/LTC    ____________________________________________________________________    Code Status: DNR/DNI  ___________________________________________________________________      Total time in minutes spent coordinating this discharge (includes going over instructions, follow-up, prescriptions, and preparing report for sign off to her PCP) :  35 minutes    Signed:  Jackelyn Suárez MD

## 2019-07-09 NOTE — PROGRESS NOTES
Surgery NP Progress Note    s/p COLON RESECTION LAPAROSCOPIC RIGHT on 7/3/2019       Assessment:   Principal Problem:    S/P right colectomy (2019)    Active Problems:    Anemia (2019)        Expected post-op progress. Slow mobility and poor appetite. Patient feels she would benefit from rehab. Plan/Recommendations/Medical Decision Making:     - Mobilize with nursing and OOB to chair for meals  - PT to see patient today and patient encouraged to participate. - Continue diet as tolerated. Patient cautioned not to force intake. - Pain management- Continue current pain control methods.   - VTE Prophylaxis: Lovenox    Discharge Planning    Plan for patient to discharge to SNF/LTC    Anticipated discharge date 19    Incision/Wound Care Needs:  Routine post-op, patient will self manage as instructed    Discharge plan discussed with:  Patient, Clinical Staff and Primary Care Team Provider      Subjective:     Patient has complaints of pain. Pain control inadequate. Patient declining further pain med however. Education provided. Patient reports PO intake adequate. No nausea/vomiting. Negative flatus. Negative stool output. Voiding status: Sylvester in place. Remove today. Objective:     Blood pressure 148/42, pulse 79, temperature 98.7 °F (37.1 °C), resp. rate 16, height 5' 4\" (1.626 m), weight 74.8 kg (165 lb), SpO2 91 %.     Temp (24hrs), Av.9 °F (37.2 °C), Min:97.4 °F (36.3 °C), Max:101.8 °F (38.8 °C)      Recent Results (from the past 48 hour(s))   CBC WITH AUTOMATED DIFF    Collection Time: 19  3:57 AM   Result Value Ref Range    WBC 9.5 3.6 - 11.0 K/uL    RBC 4.00 3.80 - 5.20 M/uL    HGB 8.9 (L) 11.5 - 16.0 g/dL    HCT 28.9 (L) 35.0 - 47.0 %    MCV 72.3 (L) 80.0 - 99.0 FL    MCH 22.3 (L) 26.0 - 34.0 PG    MCHC 30.8 30.0 - 36.5 g/dL    RDW 23.7 (H) 11.5 - 14.5 %    PLATELET 801 107 - 453 K/uL    MPV 10.0 8.9 - 12.9 FL    NRBC 0.0 0  WBC    ABSOLUTE NRBC 0.00 0.00 - 0.01 K/uL    NEUTROPHILS 68 32 - 75 %    LYMPHOCYTES 20 12 - 49 %    MONOCYTES 9 5 - 13 %    EOSINOPHILS 2 0 - 7 %    BASOPHILS 0 0 - 1 %    IMMATURE GRANULOCYTES 1 (H) 0.0 - 0.5 %    ABS. NEUTROPHILS 6.5 1.8 - 8.0 K/UL    ABS. LYMPHOCYTES 1.8 0.8 - 3.5 K/UL    ABS. MONOCYTES 0.9 0.0 - 1.0 K/UL    ABS. EOSINOPHILS 0.2 0.0 - 0.4 K/UL    ABS. BASOPHILS 0.0 0.0 - 0.1 K/UL    ABS. IMM. GRANS. 0.1 (H) 0.00 - 0.04 K/UL    DF AUTOMATED     METABOLIC PANEL, COMPREHENSIVE    Collection Time: 07/08/19  2:46 PM   Result Value Ref Range    Sodium 138 136 - 145 mmol/L    Potassium 3.2 (L) 3.5 - 5.1 mmol/L    Chloride 100 97 - 108 mmol/L    CO2 29 21 - 32 mmol/L    Anion gap 9 5 - 15 mmol/L    Glucose 130 (H) 65 - 100 mg/dL    BUN 10 6 - 20 MG/DL    Creatinine 0.90 0.55 - 1.02 MG/DL    BUN/Creatinine ratio 11 (L) 12 - 20      GFR est AA >60 >60 ml/min/1.73m2    GFR est non-AA 59 (L) >60 ml/min/1.73m2    Calcium 9.1 8.5 - 10.1 MG/DL    Bilirubin, total 0.5 0.2 - 1.0 MG/DL    ALT (SGPT) 87 (H) 12 - 78 U/L    AST (SGOT) 120 (H) 15 - 37 U/L    Alk.  phosphatase 161 (H) 45 - 117 U/L    Protein, total 7.5 6.4 - 8.2 g/dL    Albumin 2.6 (L) 3.5 - 5.0 g/dL    Globulin 4.9 (H) 2.0 - 4.0 g/dL    A-G Ratio 0.5 (L) 1.1 - 2.2     MAGNESIUM    Collection Time: 07/08/19  2:46 PM   Result Value Ref Range    Magnesium 2.0 1.6 - 2.4 mg/dL   CBC WITH AUTOMATED DIFF    Collection Time: 07/09/19  6:11 AM   Result Value Ref Range    WBC 9.8 3.6 - 11.0 K/uL    RBC 3.93 3.80 - 5.20 M/uL    HGB 8.8 (L) 11.5 - 16.0 g/dL    HCT 29.1 (L) 35.0 - 47.0 %    MCV 74.0 (L) 80.0 - 99.0 FL    MCH 22.4 (L) 26.0 - 34.0 PG    MCHC 30.2 30.0 - 36.5 g/dL    RDW 24.6 (H) 11.5 - 14.5 %    PLATELET 341 632 - 764 K/uL    MPV 10.1 8.9 - 12.9 FL    NRBC 0.0 0  WBC    ABSOLUTE NRBC 0.00 0.00 - 0.01 K/uL    NEUTROPHILS 84 (H) 32 - 75 %    LYMPHOCYTES 10 (L) 12 - 49 %    MONOCYTES 6 5 - 13 %    EOSINOPHILS 0 0 - 7 %    BASOPHILS 0 0 - 1 %    IMMATURE GRANULOCYTES 0 0.0 - 0.5 % ABS. NEUTROPHILS 8.2 (H) 1.8 - 8.0 K/UL    ABS. LYMPHOCYTES 1.0 0.8 - 3.5 K/UL    ABS. MONOCYTES 0.6 0.0 - 1.0 K/UL    ABS. EOSINOPHILS 0.0 0.0 - 0.4 K/UL    ABS. BASOPHILS 0.0 0.0 - 0.1 K/UL    ABS. IMM. GRANS. 0.0 0.00 - 0.04 K/UL    DF MANUAL      RBC COMMENTS ANISOCYTOSIS  2+       METABOLIC PANEL, COMPREHENSIVE    Collection Time: 07/09/19  6:11 AM   Result Value Ref Range    Sodium 140 136 - 145 mmol/L    Potassium 3.8 3.5 - 5.1 mmol/L    Chloride 105 97 - 108 mmol/L    CO2 30 21 - 32 mmol/L    Anion gap 5 5 - 15 mmol/L    Glucose 94 65 - 100 mg/dL    BUN 14 6 - 20 MG/DL    Creatinine 0.78 0.55 - 1.02 MG/DL    BUN/Creatinine ratio 18 12 - 20      GFR est AA >60 >60 ml/min/1.73m2    GFR est non-AA >60 >60 ml/min/1.73m2    Calcium 8.5 8.5 - 10.1 MG/DL    Bilirubin, total 0.3 0.2 - 1.0 MG/DL    ALT (SGPT) 89 (H) 12 - 78 U/L    AST (SGOT) 85 (H) 15 - 37 U/L    Alk. phosphatase 115 45 - 117 U/L    Protein, total 6.0 (L) 6.4 - 8.2 g/dL    Albumin 1.9 (L) 3.5 - 5.0 g/dL    Globulin 4.1 (H) 2.0 - 4.0 g/dL    A-G Ratio 0.5 (L) 1.1 - 2.2         Pt resting in chair. NAD   Incisions CDI. SCDs for mechanical DVT proph while in bed    Body mass index is 28.32 kg/m². Reference: BMI greater than 30 is classified as obesity and greater than 40 is classified as morbid obesity.      Last 3 Recorded Weights in this Encounter    06/28/19 [id number]   Weight: 74.8 kg (165 lb)         Nisha Walker, NP   MSN, APRN, FNP-C, CWOCN-AP    07/09/19

## 2019-07-09 NOTE — PROGRESS NOTES
Plan:  -[facility]  -[facility] transport       12:53PM  D/c order acknowledged by CM. CM met with pt and dtr. Plan for d/c to [facility]. [facility] transport arranged for 2:30PM. Family and facility aware. PCS on chart. Nursing call report to [phone]. Medicare pt has received, reviewed, and signed 2nd IM letter informing them of their right to appeal the discharge. Signed copy has been placed on pt bedside chart. Pt ready for d/c from CM perspective. CM available for any additional needs. Care Management Interventions  PCP Verified by CM:  Yes  Mode of Transport at Discharge: BLS  Transition of Care Consult (CM Consult): SNF  Partner SNF: Yes  MyChart Signup: No  Discharge Durable Medical Equipment: No  Physical Therapy Consult: Yes  Occupational Therapy Consult: Yes  Speech Therapy Consult: No  Current Support Network: Relative's Home  Confirm Follow Up Transport: Family  Plan discussed with Pt/Family/Caregiver: Yes  Freedom of Choice Offered: Yes  Discharge Location  Discharge Placement: Skilled nursing facility      JUN Alfaro  Care Manager

## 2019-07-09 NOTE — PROGRESS NOTES
Hospital to [facility] SBAR Handoff - June Peter Bowen                                                                        80 y.o.   female    [address]   Room: 2184/01    [facility] 2 GENERAL SURGERY  Unit Phone# :  [phone]      [address]  [facility] [address]  [address] [address]  Dept: [phone]  Loc: [phone]                    SITUATION     Admitted:  6/28/2019         Attending Provider:  Lucero Clancy MD       Consultations:  IP CONSULT TO COLORECTAL SURGERY  IP CONSULT TO ONCOLOGY    PCP:  None   None    Treatment Team: Attending Provider: Lucero Clancy MD; Utilization Review: Dylan Greene, RN; Utilization Review: Sal Velásquez, PETROS; Consulting Provider: Nida Montoya MD; Care Manager: Viki Whitten RN; Consulting Provider: Dipika Chicas MD; Consulting Provider: Isaías Neri MD; Nurse Practitioner: Luigi Packer NP; Care Manager: Mayank Parish    Admitting Dx: Anemia [D64.9]       Principal Problem: S/P right colectomy    6 Days Post-Op of   Procedure(s):  COLON RESECTION LAPAROSCOPIC RIGHT   BY: Nida Montoya MD             ON: 7/3/2019                  Code Status: DNR                Advance Directives:   Advance Care Planning 6/30/2019   Patient's Healthcare Decision Maker is: Named in scanned ACP document   Confirm Advance Directive -   Does the patient have other document types -    (Send w/patient)   Not Received       Isolation:  There are currently no Active Isolations       MDRO: No current active infections    Pain Medications given:  none    Last dose: none at  none    Special Equipment needed: no  Type of equipment:       BACKGROUND     Allergies: Allergies   Allergen Reactions    Diclofenac Other (comments)     Forgot reaction    Hydrocodone Other (comments)     Forgot reaction    Lisinopril Cough    Oxycodone Other (comments)     Forgot reaction    Promethazine Other (comments)     Pt.  Forgot the reaction    Tramadol Other (comments)     Forgot reaction       Past Medical History:   Diagnosis Date    Hypertension        Past Surgical History:   Procedure Laterality Date    COLONOSCOPY N/A 7/1/2019    COLONOSCOPY performed [facility] at [facility]OSCOPY    COLONOSCOPY,DIAGNOSTIC  7/1/2019         COLONOSCPY,FLEX,W/ [address] INJECT  7/1/2019         HX ORTHOPAEDIC      tia. hip replacement    UPPER GI ENDOSCOPY,BIOPSY  7/1/2019            Medications Prior to Admission   Medication Sig    valsartan (DIOVAN) 160 mg tablet Take 160 mg by mouth daily.  valsartan-hydroCHLOROthiazide (DIOVAN-HCT) 160-12.5 mg per tablet Take 1 Tab by mouth every other day.  [DISCONTINUED] nebivolol (BYSTOLIC) 5 mg tablet daily. Hard scripts included in transfer packet yes    Vaccinations: There is no immunization history on file for this patient. Readmission Risks:    Known Risks: fall        The Charlson [employer] Index tool is an evidenced based tool that has more automatic generated information. The tool looks at many different items such as the age of the patient, how many times they were admitted in the last calendar year, current length of stay in the hospital and their diagnosis. All of these items are pulled automatically from information documented in the chart from various places and will generate a score that predicts whether a patient is at low (less than 13), medium (13-20) or high (21 or greater) risk of being readmitted.         ASSESSMENT                Temp: 98.1 °F (36.7 °C) (07/09/19 1227) Pulse (Heart Rate): 71 (07/09/19 1227)     Resp Rate: 16 (07/09/19 1227)           BP: 148/48 (07/09/19 1227)     O2 Sat (%): 92 % (07/09/19 1227)     Weight: 74.8 kg (165 lb)    Height: 5' 4\" (162.6 cm) (06/28/19 1835)       If above not within 1 hour of discharge:    BP:_____  P:____  R:____ T:_____ O2 Sat: ___%  O2: ______    Active Orders   Diet    DIET REGULAR Low Fiber         Orientation: oriented to time, place, person and situation     Active Behaviors: None                                   Active Lines/Drains:  (Peg Tube / Sylvester / CL or S/L?): no    Urinary Status: Voiding, Bathroom privileges     Last BM: Last Bowel Movement Date: 07/08/19     Skin Integrity: Incision (comment), Intact(lap sites to abdomen)             Mobility: Slightly limited   Weight Bearing Status: WBAT (Weight Bearing as Tolerated)      Gait Training  Assistive Device: Walker, rolling  Ambulation - Level of Assistance: Contact guard assistance  Distance (ft): 40 Feet (ft)  Stairs - Level of Assistance: Contact guard assistance  Number of Stairs Trained: 3  Rail Use: Left          Lab Results   Component Value Date/Time    Glucose 94 07/09/2019 06:11 AM    HGB 8.8 (L) 07/09/2019 06:11 AM    HGB 8.9 (L) 07/08/2019 03:57 AM        RECOMMENDATION     See After Visit Summary (AVS) for:  · Discharge instructions  · [facility]   · Special equipment needed (entered pre-discharge by Care Management)  · Medication Reconciliation    · Follow up Appointment(s)         Report given/sent by:  Lilly Dolan RN                    Verbal report given to: Rommel Oglesby LPN  FAXED to:  yes         Estimated discharge time:  7/9/2019 at 1430

## 2019-07-09 NOTE — PROGRESS NOTES
Transition of Care Plan to SNF/Rehab    SNF/Rehab Transition:  Patient has been accepted to [facility] and meets criteria for admission. Patient will transported by  and expected to leave at ~2:30PM.    Communication to Patient/Family:  Met with patient and Madie Gloria pt's dtr, and they are agreeable to the transition plan. Communication to SNF/Rehab:  Bedside RN, Terri Monroe, has been notified to update the transition plan to the facility and call report (phone number [phone]). Discharge information has been updated on the AVS.     Discharge instructions to be fax'd to facility at [facility] #            ).     SNF/Rehab Transition:  Patient to follow-up with Home Health: TBD  PCP/Specialist: TBD  Ambulatory Care Management: TBD    Reviewed and confirmed with facility, [facility], they can manage the patient care needs for the following:     Dante Boyce with (X) only those applicable:    Medication:  [x]  Medications will be available at the facility  []  IV Antibiotics N/A  [x]  Controlled Substance - hard copy to be sent with patient   []  Weekly Labs   Documents:  [x] Hard RX  [] MAR  [] Kardex  [x] AVS  []Transfer Summary  []Discharge   Equipment:  []  CPAP/BiPAP  []  Wound Vacuum  []  Sylvester or Urinary Device  []  PICC/Central Line  []  Nebulizer  []  Ventilator   Treatment:  []Isolation (for MRSA, VRE, etc.)  []Surgical Drain Management  []Tracheostomy Care  []Dressing Changes  []Dialysis with transportation and chair time N/A.  []PEG Care  []Oxygen  []Daily Weights for Heart Failure   Dietary:  [x]Any diet limitations (Low fiber diet)  []Tube Feedings   []Total Parenteral Management (TPN)   Eligible for Medicaid Long Term Services and Supports  Yes:  [] Eligible for medical assistance or will become eligible within 180 days and UAI completed. [] Provider/Patient and/or support system has requested screening.   [] UAI copy provided to patient or responsible party,  .  [] UAI unavailable at discharge will send once processed to SNF provider. [] UAI unavailable at discharged mailed to patient  No:   [] Private pay and is not financially eligible for Medicaid within the next 180 days. [] Reside out-of-state.   [] A residents of a state owned/operated facility that is licensed  by [facility] and [facility] Services or [facility]  [] Enrollment in [facility] hospice services  []  Medical [facility]  [] Patient /Family declines to have screening completed or provide financial information for screening    Pt has private LTC policy   Financial Resources:  Medicaid    [] Initiated and application pending   [] Full coverage     Advanced Care Plan:  [x]Surrogate Decision Maker of Care  [x]POA  []Communicated Code Status--IP DNR    Other

## 2019-07-09 NOTE — ROUTINE PROCESS
Follow up CMP shows mild transaminitis, ? Post surgical and hypokalemia.  Potassium replaced, recheck labs in am.

## 2019-07-09 NOTE — PROGRESS NOTES
Pt leaving room via ambulance to [facility]. PIV removed and applied dry dressing. Pt has paperwork given and multiple scripts given. Signed copy on bedside chart. Report called to SNF.

## 2019-07-09 NOTE — PROGRESS NOTES
Plan:  -SNF rehab ([address])    8:55AM  VMs from pt's dtr, uJan F Jones, and return call from CM. Family's preference for SNF is 1. [facility] 2. [facility]. Referral sent to [facility] through [facility]. 9:38AM  Referral accepted and selected in [facility]. CM updated [address] [facility] on pt choice. CM will continue to follow and assist with d/c planning.      Roya Easley, MSW  Care Manager

## 2019-07-09 NOTE — PROGRESS NOTES
Hematology Oncology Progress Note       Follow up for: colon cancer  Chart notes reviewed since last visit. Case discussed with following: daughter    Patient complains of the following: In good spirits. Additional concerns noted by the staff:     Patient Vitals for the past 24 hrs:   BP Temp Pulse Resp SpO2   07/09/19 0637 148/42 98.7 °F (37.1 °C) 79 16 91 %   07/09/19 0457 144/48 98.8 °F (37.1 °C) 77 16 96 %   07/08/19 2326 (!) 139/37 98.9 °F (37.2 °C) 71 15 97 %   07/08/19 1906 (!) 143/37 (!) 101.8 °F (38.8 °C) 76 14 97 %   07/08/19 1549 170/46 98.6 °F (37 °C) 87 16 97 %   07/08/19 1407  97.4 °F (36.3 °C) 76 18 99 %   07/08/19 1137 152/56 98.1 °F (36.7 °C) 72 16 96 %             Physical Examination:  Gen NAD    Labs:  Recent Results (from the past 24 hour(s))   METABOLIC PANEL, COMPREHENSIVE    Collection Time: 07/08/19  2:46 PM   Result Value Ref Range    Sodium 138 136 - 145 mmol/L    Potassium 3.2 (L) 3.5 - 5.1 mmol/L    Chloride 100 97 - 108 mmol/L    CO2 29 21 - 32 mmol/L    Anion gap 9 5 - 15 mmol/L    Glucose 130 (H) 65 - 100 mg/dL    BUN 10 6 - 20 MG/DL    Creatinine 0.90 0.55 - 1.02 MG/DL    BUN/Creatinine ratio 11 (L) 12 - 20      GFR est AA >60 >60 ml/min/1.73m2    GFR est non-AA 59 (L) >60 ml/min/1.73m2    Calcium 9.1 8.5 - 10.1 MG/DL    Bilirubin, total 0.5 0.2 - 1.0 MG/DL    ALT (SGPT) 87 (H) 12 - 78 U/L    AST (SGOT) 120 (H) 15 - 37 U/L    Alk.  phosphatase 161 (H) 45 - 117 U/L    Protein, total 7.5 6.4 - 8.2 g/dL    Albumin 2.6 (L) 3.5 - 5.0 g/dL    Globulin 4.9 (H) 2.0 - 4.0 g/dL    A-G Ratio 0.5 (L) 1.1 - 2.2     MAGNESIUM    Collection Time: 07/08/19  2:46 PM   Result Value Ref Range    Magnesium 2.0 1.6 - 2.4 mg/dL   CBC WITH AUTOMATED DIFF    Collection Time: 07/09/19  6:11 AM   Result Value Ref Range    WBC 9.8 3.6 - 11.0 K/uL    RBC 3.93 3.80 - 5.20 M/uL    HGB 8.8 (L) 11.5 - 16.0 g/dL    HCT 29.1 (L) 35.0 - 47.0 %    MCV 74.0 (L) 80.0 - 99.0 FL    MCH 22.4 (L) 26.0 - 34.0 PG MCHC 30.2 30.0 - 36.5 g/dL    RDW 24.6 (H) 11.5 - 14.5 %    PLATELET 304 616 - 235 K/uL    MPV 10.1 8.9 - 12.9 FL    NRBC 0.0 0  WBC    ABSOLUTE NRBC 0.00 0.00 - 0.01 K/uL    NEUTROPHILS 84 (H) 32 - 75 %    LYMPHOCYTES 10 (L) 12 - 49 %    MONOCYTES 6 5 - 13 %    EOSINOPHILS 0 0 - 7 %    BASOPHILS 0 0 - 1 %    IMMATURE GRANULOCYTES 0 0.0 - 0.5 %    ABS. NEUTROPHILS 8.2 (H) 1.8 - 8.0 K/UL    ABS. LYMPHOCYTES 1.0 0.8 - 3.5 K/UL    ABS. MONOCYTES 0.6 0.0 - 1.0 K/UL    ABS. EOSINOPHILS 0.0 0.0 - 0.4 K/UL    ABS. BASOPHILS 0.0 0.0 - 0.1 K/UL    ABS. IMM. GRANS. 0.0 0.00 - 0.04 K/UL    DF MANUAL      RBC COMMENTS ANISOCYTOSIS  2+       METABOLIC PANEL, COMPREHENSIVE    Collection Time: 07/09/19  6:11 AM   Result Value Ref Range    Sodium 140 136 - 145 mmol/L    Potassium 3.8 3.5 - 5.1 mmol/L    Chloride 105 97 - 108 mmol/L    CO2 30 21 - 32 mmol/L    Anion gap 5 5 - 15 mmol/L    Glucose 94 65 - 100 mg/dL    BUN 14 6 - 20 MG/DL    Creatinine 0.78 0.55 - 1.02 MG/DL    BUN/Creatinine ratio 18 12 - 20      GFR est AA >60 >60 ml/min/1.73m2    GFR est non-AA >60 >60 ml/min/1.73m2    Calcium 8.5 8.5 - 10.1 MG/DL    Bilirubin, total 0.3 0.2 - 1.0 MG/DL    ALT (SGPT) 89 (H) 12 - 78 U/L    AST (SGOT) 85 (H) 15 - 37 U/L    Alk. phosphatase 115 45 - 117 U/L    Protein, total 6.0 (L) 6.4 - 8.2 g/dL    Albumin 1.9 (L) 3.5 - 5.0 g/dL    Globulin 4.1 (H) 2.0 - 4.0 g/dL    A-G Ratio 0.5 (L) 1.1 - 2.2         Assessment and Plan:   Colon cancer - s/p resection. Will followup on path when available. questions answered re surgery and followup. Dr. Carlee Rhoades can see in 3-4 weeks for followup of anemia and then q 3 months for cancer followup if desired. -HBG stable    Will sign off. Please call with any questions.

## 2019-07-09 NOTE — DISCHARGE INSTRUCTIONS
Patient Education   Patient Education        Colon Cancer: Care Instructions  Your Care Instructions    Colon cancer occurs when abnormal cells grow out of control in the lower part of your intestine (your colon). If the tumor was small and had not spread, your doctor may have removed it during the colonoscopy. But you may need surgery to remove the cancer if the tumor was too big or had spread too far to be removed during a colonoscopy. If cancer has spread to another part of your body, such as the liver, you may need more far-reaching surgery. Treatment for colon cancer may also include radiation therapy and medicines that destroy cancer cells (chemotherapy). Being treated for cancer can weaken your body, and you may feel very tired. Get the rest your body needs so that you can feel better. When you find out that you have cancer, you may feel many emotions and may need some help coping. Seek out family, friends, and counselors for support. You also can do things at home to make yourself feel better while you go through treatment. Call the [facility] ([phone]) or visit its website at [employer] [place] for more information. Follow-up care is a key part of your treatment and safety. Be sure to make and go to all appointments, and call your doctor if you are having problems. It's also a good idea to know your test results and keep a list of the medicines you take. How can you care for yourself at home? · Take your medicines exactly as prescribed. Call your doctor if you think you are having a problem with your medicine. You may get medicine for nausea and vomiting if you have these side effects. · Follow your doctor's instructions to relieve pain. Pain from cancer and surgery can almost always be controlled. Use pain medicine when you first notice pain, before it becomes severe. · Eat healthy food.  If you do not feel like eating, try to eat food that has protein and extra calories to keep up your strength and prevent weight loss. Drink liquid meal replacements for extra calories and protein. Try to eat your main meal early. · Get some physical activity every day, but do not get too tired. Keep doing the hobbies you enjoy as your energy allows. · Take steps to control your stress and workload. Learn relaxation techniques. ? Share your feelings. Stress and tension affect our emotions. By expressing your feelings to others, you may be able to understand and cope with them. ? Consider joining a support group. Talking about a problem with your spouse, a good friend, or other people with similar problems is a good way to reduce tension and stress. ? Express yourself through art. Try writing, dance, art, or crafts to relieve stress. Some dance, writing, or art groups may be available just for people who have cancer. ? Be kind to your body and mind. Getting enough sleep, eating a healthy diet, and taking time to do things you enjoy can contribute to an overall feeling of balance in your life and help reduce stress. ? Get help if you need it. Discuss your concerns with your doctor or counselor. · If you are vomiting or have diarrhea:  ? Drink plenty of fluids (enough so that your urine is light yellow or clear like water) to prevent dehydration. Choose water and other caffeine-free clear liquids. If you have kidney, heart, or liver disease and have to limit fluids, talk with your doctor before you increase the amount of fluids you drink. ? When you are able to eat, try clear soups, mild foods, and liquids until all symptoms are gone for 12 to 48 hours. Other good choices include dry toast, crackers, cooked cereal, and gelatin dessert, such as Jell-O.  · If you have not already done so, prepare a list of advance directives. Advance directives are instructions to your doctor and family members about what kind of care you want if you become unable to speak or express yourself.   When should you call for help?  Call 911 anytime you think you may need emergency care. For example, call if:    · You pass maroon or very bloody stools.     · You passed out (lost consciousness).    Call your doctor now or seek immediate medical care if:    · You have a fever.     · You are vomiting.     · You have new or worse belly pain.     · You cannot pass stools or gas.     · You have new or more blood in your stool.    Watch closely for changes in your health, and be sure to contact your doctor if:    · You are losing weight.     · You have new or worse symptoms.     · You do not get better as expected. Where can you learn more? Go to http://mynor-randi.info/. Enter U805 in the search box to learn more about \"Colon Cancer: Care Instructions. \"  Current as of: March 27, 2018  Content Version: 11.9  © 3074-8481 [employer]. Care instructions adapted under license by [employer] (which disclaims liability or warranty for this information). If you have questions about a medical condition or this instruction, always ask your healthcare professional. [address] any warranty or liability for your use of this information. Low-Fiber Diet: Care Instructions  Your Care Instructions    When your bowels are irritated, you may need to limit fiber in your diet until the problem clears up. Your doctor and dietitian can help you design a low-fiber diet based on your health and what you prefer to eat. Ask your doctor how long you should stay on a low-fiber diet. Your doctor probably will have you start adding more fiber to your diet as you get better. Always talk with your doctor or dietitian before you make changes in your diet. Follow-up care is a key part of your treatment and safety. Be sure to make and go to all appointments, and call your doctor if you are having problems.  It's also a good idea to know your test results and keep a list of the medicines you take.  How can you care for yourself at home? · Choose white or refined grains, and avoid whole grains. That means eating white or refined cereals, breads, crackers, rice, or pasta. · Peel the skin from fruits and vegetables before you eat or cook them. Avoid eating skins, seeds, and hulls. ? Eat frozen or canned fruit. Low-fiber fruits include applesauce, ripe bananas, and fruit juice without pulp. ? Eat low-fiber vegetables, which include well-cooked vegetables and vegetable juice. · Drink or eat milk, yogurt, or other milk products, if you can digest dairy without too many problems. Your doctor may limit milk and milk products for a while. If so, he or she may recommend a calcium and vitamin D supplement. · Eat well-cooked meat, such as chicken, turkey, fish, or lean meat. You also can eat eggs and tofu. · Avoid these foods:  ? Bran, brown or wild rice, oatmeal, granola, corn, mo crackers, barley, and whole wheat and other whole-grain breads, such as rye bread  ? Cereals with more than 3 grams of fiber a serving  ? Berries, rhubarb, prunes, prune juice, and all dried fruits  ? Raw vegetables  ? Cabbage, broccoli, brussels sprouts, and cauliflower  ? Cooked dried beans, lentils, and split peas  ? Crunchy peanut butter  ? Ice cream with fruit pieces in it  ? Coconut, nuts, popcorn, raisins, and seeds, or any ice cream, yogurt, or cheese with these in them  Where can you learn more? Go to http://mynor-randi.info/. Enter A678 in the search box to learn more about \"Low-Fiber Diet: Care Instructions. \"  Current as of: March 28, 2018  Content Version: 11.9  © 1078-9391 [employer]. Care instructions adapted under license by [employer] (which disclaims liability or warranty for this information).  If you have questions about a medical condition or this instruction, always ask your healthcare professional. Sheridan Mena disclaims any warranty or liability for your use of this information. HOSPITALIST DISCHARGE INSTRUCTIONS    NAME: Isis Valadez   :  1932   MRN:  [id number]     Date/Time:  2019 12:10 PM    ADMIT DATE: 2019   DISCHARGE DATE: 2019     Attending Physician: Cezar Saavedra MD    DISCHARGE DIAGNOSIS:  Anemia, Colon Cancer, HTN, leukocytosis, Bradycardia, hypokalemia, Overweight      Medications: Per above medication reconciliation. Pain Management: per above medications    Recommended diet: Cardiac Diet and low fiber    Recommended activity: Activity as tolerated    Wound care: See surgical/procedure care instructions    Indwelling devices:  None    Supplemental Oxygen: None    Required Lab work: Per SNF routine    Glucose management:  None    Code status: DNR        Outside physician follow up: Follow-up Information     Follow up With Specialties Details Why Contact Info    Codi Montelongo MD Colon and Rectal Surgery Schedule an appointment as soon as possible for a visit in 2 weeks  [address] [place] [address]  [phone] [address] [phone]      None    None (738) Patient stated that they have no PCP                 Skilled nursing facility/ SNF MD responsible for above on discharge. Information obtained by :  I understand that if any problems occur once I am at home I am to contact my physician. I understand and acknowledge receipt of the instructions indicated above.                                                                                                                                            Physician's or R.N.'s Signature                                                                  Date/Time                                                                                                                                              Patient or [facility] Maureen Henriquez MD, FACS  Lonny Torres MD, FACS  Joyce Gutierres. Christina Mandel MD, [facility] Misha Winslow MD, [facility] Cecelia Catherine MD, FACS  Erlinda Ascencio. MD Barbara Lino MD    [facility] Discharge Instructions for Colon Surgery Patients    1. Diagnosis: Right Colon Mass  2. Restricted fiber diet. 3. Do not drive for 2 weeks or until after your next doctors appointment. 4. Leave dermabond glue on incisions. It may fall off on its own. 5. May take a shower. 6. No lifting any objects weighing more than 10 pounds. Do not do any housework, such as vacuuming, scrubbing, etc for at least a month. 7. When you get tired during the day, take naps, as you need your rest.  8. Multiple bowel movements are normal each day for a while. 9. May walk as desired. May go up and down stairs. 10. Take pain medication as prescribed: (NO DRIVING WHILE ON PAIN MEDICATIONS). Ultram (Tramadol) EVERY 4-6 HOURS AS NEEDED. 11.  See me in the office in 10-14 days. Call as soon as discharged for an appointment 21 [phone]. 12.  Call the [facility] [phone], if you have any questions or problems after office hours.

## 2019-07-10 ENCOUNTER — PATIENT OUTREACH (OUTPATIENT)
Dept: CASE MANAGEMENT | Age: 84
End: 2019-07-10

## 2019-07-10 NOTE — PROGRESS NOTES
Community Care Team documentation for patient in [facility]  Initial Follow Up       Patient was discharged to [address] [place] [address]. Information included in this progress note has been provided to SNF. Hospital Admission and Diagnosis: [facility] 6/28-7/9  Anemia      RRAT Score:  8       Advance Care Planning: Advance Directive on file     PCP : None    Spoke with SNF team. Ensured patient arrived to SNF safely with admission packet in order. Appointments: Provided needed hospital follow up appointments:  Shalonda Rowell MD Colon and Rectal Surgery in 10-14 days Call as soon as discharged for an appointment 21 [phone] ; Jed Arriaza MD Hematology & Oncology 2 weeks; New Patient PCP appt to establish care with Patricia Boland III, DO on 8/1 at 9:30 AM.     SNF Medical update: Discussed need for Cardiac Diet and low fiber diet per surgeon. PT/OT evaluations to be completed today 7/10. Anticipated LOS / Disposition: TBD       PCP appointment: Patient has appointment to establish care with PCP Patricia Boland III, DO on 8/1 at 9:30 AM.    [facility] Team will follow up weekly with [facility] until discharge. Medications were not reconciled and general patient assessment was not completed during this [facility] outreach.       Diann Hampton, MSN, RN, ACNS-BC, [facility]  Nurse Navigator, [employer] [phone]

## 2019-07-17 ENCOUNTER — PATIENT OUTREACH (OUTPATIENT)
Dept: CASE MANAGEMENT | Age: 84
End: 2019-07-17

## 2019-07-17 NOTE — PROGRESS NOTES
Community Care Team Documentation for Patient in [facility]  Subsequent Follow up     [address] ([facility]). See previous [facility] Team notes. SNF unable to participate in CCT call 7/17 at 5:11AH due to conflicting meeting. Heri to provide disposition update by 7/17. Next CCT call with [facility] 7/25. poke with SNF team.     Medications were not reconciled and general patient assessment was not completed during this skilled nursing facility outreach.      Kalli Mendenhall, MSN, RN, ACNS-BC, [facility]  Nurse Navigator, [employer] [phone]

## 2019-07-19 NOTE — PROGRESS NOTES
Community Care Team Documentation for Patient in [facility]  Subsequent Follow up     Patient remains at [address] ([facility]). See previous [facility] Team notes. PCP : None    Spoke with SNF team.  PT/OT continue. Currently CGA with Bed mobility and transfers. Ambulating  ft with FWW. Supervision with upper body ADLs. Min assist with lower body ADLs. Min assist with toileting and showering. Mod assist with IADLs. Anticipate 3 more weeks LOS. Per inpatient CM notes, patient lives with daughter and son in law who both work during the day. Patient selected [facility] in hospital. Patient has appointment to establish care with PCP Silke Irving III, DO on 8/1 at 9:30 AM. Will need to be rescheduled if patient remains in SNF. Medications were not reconciled and general patient assessment was not completed during this skilled nursing facility outreach.      Saint Crumble, DENISE, RN, ACNS-BC, [facility]  Nurse Navigator, [employer] [phone]

## 2019-07-22 ENCOUNTER — HOSPITAL ENCOUNTER (OUTPATIENT)
Dept: CT IMAGING | Age: 84
Discharge: HOME | End: 2019-07-22
Attending: SURGERY
Payer: MEDICARE

## 2019-07-22 DIAGNOSIS — T81.41XA: ICD-10-CM

## 2019-07-22 PROCEDURE — 74177 CT ABD & PELVIS W/CONTRAST: CPT

## 2019-07-22 PROCEDURE — 74011636320 HC RX REV CODE- 636/320: Performed by: SURGERY

## 2019-07-22 RX ORDER — SODIUM CHLORIDE 0.9 % (FLUSH) 0.9 %
10 SYRINGE (ML) INJECTION
Status: COMPLETED | OUTPATIENT
Start: 2019-07-22 | End: 2019-07-22

## 2019-07-22 RX ADMIN — Medication 10 ML: at 13:00

## 2019-07-22 RX ADMIN — IOPAMIDOL 100 ML: 755 INJECTION, SOLUTION INTRAVENOUS at 13:00

## 2019-07-22 RX ADMIN — IOHEXOL 50 ML: 240 INJECTION, SOLUTION INTRATHECAL; INTRAVASCULAR; INTRAVENOUS; ORAL at 13:00

## 2019-07-24 ENCOUNTER — PATIENT OUTREACH (OUTPATIENT)
Dept: CASE MANAGEMENT | Age: 84
End: 2019-07-24

## 2019-07-25 ENCOUNTER — HOSPITAL ENCOUNTER (OUTPATIENT)
Dept: CT IMAGING | Age: 84
Discharge: HOME | End: 2019-07-25
Attending: SURGERY
Payer: MEDICARE

## 2019-07-25 VITALS
OXYGEN SATURATION: 97 % | SYSTOLIC BLOOD PRESSURE: 120 MMHG | TEMPERATURE: 97.6 F | HEART RATE: 69 BPM | HEIGHT: 64 IN | BODY MASS INDEX: 26.46 KG/M2 | RESPIRATION RATE: 18 BRPM | WEIGHT: 155 LBS | DIASTOLIC BLOOD PRESSURE: 56 MMHG

## 2019-07-25 DIAGNOSIS — K65.1 RIGHT LOWER QUADRANT ABSCESS (HCC): ICD-10-CM

## 2019-07-25 PROCEDURE — 74011250636 HC RX REV CODE- 250/636: Performed by: RADIOLOGY

## 2019-07-25 PROCEDURE — 72192 CT PELVIS W/O DYE: CPT

## 2019-07-25 PROCEDURE — 74011250637 HC RX REV CODE- 250/637: Performed by: RADIOLOGY

## 2019-07-25 PROCEDURE — C1729 CATH, DRAINAGE: HCPCS

## 2019-07-25 RX ORDER — MIDAZOLAM HYDROCHLORIDE 1 MG/ML
5 INJECTION, SOLUTION INTRAMUSCULAR; INTRAVENOUS
Status: DISCONTINUED | OUTPATIENT
Start: 2019-07-25 | End: 2019-07-25

## 2019-07-25 RX ORDER — SODIUM CHLORIDE 9 MG/ML
25 INJECTION, SOLUTION INTRAVENOUS CONTINUOUS
Status: DISCONTINUED | OUTPATIENT
Start: 2019-07-25 | End: 2019-07-25

## 2019-07-25 RX ORDER — FENTANYL CITRATE 50 UG/ML
100 INJECTION, SOLUTION INTRAMUSCULAR; INTRAVENOUS
Status: DISCONTINUED | OUTPATIENT
Start: 2019-07-25 | End: 2019-07-25

## 2019-07-25 RX ORDER — ACETAMINOPHEN 325 MG/1
650 TABLET ORAL ONCE
Status: COMPLETED | OUTPATIENT
Start: 2019-07-25 | End: 2019-07-25

## 2019-07-25 RX ADMIN — FENTANYL CITRATE 25 MCG: 50 INJECTION, SOLUTION INTRAMUSCULAR; INTRAVENOUS at 13:17

## 2019-07-25 RX ADMIN — MIDAZOLAM 1 MG: 1 INJECTION INTRAMUSCULAR; INTRAVENOUS at 13:20

## 2019-07-25 RX ADMIN — ACETAMINOPHEN 650 MG: 325 TABLET ORAL at 13:58

## 2019-07-25 RX ADMIN — SODIUM CHLORIDE 25 ML/HR: 900 INJECTION, SOLUTION INTRAVENOUS at 11:48

## 2019-07-25 RX ADMIN — MIDAZOLAM 1 MG: 1 INJECTION INTRAMUSCULAR; INTRAVENOUS at 13:17

## 2019-07-25 RX ADMIN — MIDAZOLAM 1 MG: 1 INJECTION INTRAMUSCULAR; INTRAVENOUS at 13:19

## 2019-07-25 NOTE — ROUTINE PROCESS
Drain procedure was canceled after procedure was started and  imaging was obtained in CT. Area of concern has diminished per Dr. Patricia Yeager. Dr. Estrada Fountain notified by Dr. Patricia Yeager and pt canceled. Pt discharged back to rehab facility via transport, accompanied by her daughter.

## 2019-07-25 NOTE — DISCHARGE INSTRUCTIONS
GENERAL DRAIN DISCHARGE INSTRUCTIONS    General Information:     A plastic catheter has been inserted through your skin and into area that needs to be drained. Your doctor ordered this procedure to be done in the event of an abscess, or other fluid collection in your body. You will notice a drainage bag attached to the catheter. When the fluid has all been removed, your doctor will send you back to us for the removal of the catheter. If you are going home with the catheter in place, your doctor may order a home health nurse to come to your house and assist with the care of the catheter. Your doctor will most likely order antibiotic tablets for you to take while you have this tube. Home Care Instructions: You can resume your regular diet and medication regimen. Do not drink alcohol, drive, or make any important legal decisions in the next 24 hours. Do not lift anything heavier than a gallon of milk, or do anything strenuous for the next 24 hours. You should not shower for 24 hours. You should cover the tube with plastic wrap and tape to keep it dry when you shower. You can disconnect the drainage bag while showering. The home health nurse or the nurse who discharges from the hospital will teach you how to do this. Do not take a bath, swim or immerse yourself in water as long as you have this drainage tube. You should clean the tube and change the dressing every  48 hours and as needed. Keep the dressing clean and dry. Keep up with how much drainage you get from the tube and report this to your doctor on each visit. You have been given sedating medications, so do not drive or drink alcohol today. Call If:     You should call your Physician and/or the Radiology Nurse if you have any bleeding other than a small spot on your bandage. Call if you have any signs of infection, fever, or increased pain at the site of the tube.  Call if you should have leakage around the tube, an increased yellowing of the skin, increased pain in the abdomen, a change in the amount or appearance of the fluid, or if the tube gets pulled out some or all the way out. Follow-Up Instructions: Please see your ordering doctor as he/she has requested.  To Reach Us:  [phone] (Radiology Nurse)    Date: 7/25/2019  Discharging Nurse: Whit Merritt RN

## 2019-07-25 NOTE — ROUTINE PROCESS
Dr. Fer Blum into speak with pt regarding procedure, procedure explained along with risks and benefits; consent obtained for Drain placement at this time.

## 2019-07-29 ENCOUNTER — TELEPHONE (OUTPATIENT)
Dept: INTERNAL MEDICINE CLINIC | Age: 84
End: 2019-07-29

## 2019-07-29 NOTE — TELEPHONE ENCOUNTER
Michelle Salcedo pt daughter called to see if the new patient  appointment on Thursday 8/01 could be changed to a sooner date because the pt is still in rehab.  Best contact number [phone]       Message received & copied from [facility]

## 2019-07-30 NOTE — TELEPHONE ENCOUNTER
Called and spoke with pt's daughter, Sweetie Guevara. NP appointment rescheduled to 9/16 @ 21 [phone] with Dr. Malissa Darling verbalized understanding of information discussed w/ no further questions at this time.

## 2019-07-31 ENCOUNTER — PATIENT OUTREACH (OUTPATIENT)
Dept: CASE MANAGEMENT | Age: 84
End: 2019-07-31

## 2019-07-31 NOTE — PROGRESS NOTES
Community Care Team Documentation for Patient in [facility]  Subsequent Follow up     Patient remains at [address] ([facility]). See previous [facility] Team notes. PCP : None    Spoke with SNF team.  PT/OT update: Currently min assist with bed mobility. Supervision with transfers. Ambulating 100 ft with RW with CGA. Independent with upper body bathing and dressing. Min assist with lower body bathing and drerssing. CGA with toileting. Barriers: Needs to establish care with new PCP. LOS/ Disposition: Plan for discharge 8/7 to home with daughter with [facility]. PCP follow up to establish care has been rescheduled by the PCP office for 9/16 at 11:30 AM with Silke Irving III, DO.     Medications were not reconciled and general patient assessment was not completed during this skilled nursing facility outreach.      Saint Crumble, MSN, RN, ACNS-BC, [facility]  Nurse Navigator, [employer] [phone]

## 2019-08-07 ENCOUNTER — PATIENT OUTREACH (OUTPATIENT)
Dept: CASE MANAGEMENT | Age: 84
End: 2019-08-07

## 2019-08-09 NOTE — PROGRESS NOTES
Community Care Team Documentation for Patient in [facility]  Subsequent Follow up     [address] ([facility]). See previous [facility] Team notes. SNF team unable to provide updates this week. Medications were not reconciled and general patient assessment was not completed during this skilled nursing facility outreach.      Grayson Zee, MSN, RN, ACNS-BC, [facility]  Nurse Navigator, [employer] [phone]

## 2019-08-14 ENCOUNTER — PATIENT OUTREACH (OUTPATIENT)
Dept: CASE MANAGEMENT | Age: 84
End: 2019-08-14

## 2019-08-14 NOTE — PROGRESS NOTES
Community Care Team Documentation for Patient in [facility]  Discharge Note    Patient has been discharged from [address] ([facility]). See previous [facility] Team notes. PCP : None    Spoke with SNF team.  Confirmed patient was discharged 8/13 to home with daughter and [facility] PAT[facility]. PCP follow up to establish care has been rescheduled by the PCP office for 9/16 at 11:30 AM with Jez Navarro III, DO. Community Care Team will sign off at this time. Medications were not reconciled and general patient assessment was not completed during this skilled nursing facility outreach.      Indu Montoya, MSN, RN, ACNS-BC, [facility]  Nurse Navigator, [employer] [phone]

## 2019-08-19 ENCOUNTER — PATIENT OUTREACH (OUTPATIENT)
Dept: CASE MANAGEMENT | Age: 84
End: 2019-08-19

## 2019-09-05 RX ORDER — FOLIC ACID 1 MG/1
TABLET ORAL
Qty: 30 TAB | Refills: 0 | Status: SHIPPED | OUTPATIENT
Start: 2019-09-05 | End: 2019-10-07 | Stop reason: SDUPTHER

## 2019-09-05 RX ORDER — PANTOPRAZOLE SODIUM 40 MG/1
TABLET, DELAYED RELEASE ORAL
Qty: 60 TAB | Refills: 0 | Status: SHIPPED | OUTPATIENT
Start: 2019-09-05 | End: 2019-09-16

## 2019-09-05 RX ORDER — NEBIVOLOL HYDROCHLORIDE 2.5 MG/1
TABLET ORAL
Qty: 30 TAB | Refills: 0 | Status: SHIPPED | OUTPATIENT
Start: 2019-09-05 | End: 2019-09-16

## 2019-09-16 ENCOUNTER — OFFICE VISIT (OUTPATIENT)
Dept: INTERNAL MEDICINE CLINIC | Age: 84
End: 2019-09-16

## 2019-09-16 VITALS
DIASTOLIC BLOOD PRESSURE: 68 MMHG | SYSTOLIC BLOOD PRESSURE: 133 MMHG | OXYGEN SATURATION: 97 % | RESPIRATION RATE: 16 BRPM | HEART RATE: 58 BPM | BODY MASS INDEX: 28.82 KG/M2 | WEIGHT: 156.6 LBS | HEIGHT: 62 IN | TEMPERATURE: 97.5 F

## 2019-09-16 DIAGNOSIS — Z13.5 SCREENING FOR GLAUCOMA: ICD-10-CM

## 2019-09-16 DIAGNOSIS — Z00.00 INITIAL MEDICARE ANNUAL WELLNESS VISIT: Primary | ICD-10-CM

## 2019-09-16 DIAGNOSIS — D50.0 IRON DEFICIENCY ANEMIA DUE TO CHRONIC BLOOD LOSS: ICD-10-CM

## 2019-09-16 DIAGNOSIS — I10 ESSENTIAL HYPERTENSION: ICD-10-CM

## 2019-09-16 DIAGNOSIS — Z90.49 S/P RIGHT COLECTOMY: ICD-10-CM

## 2019-09-16 DIAGNOSIS — M81.0 POSTMENOPAUSAL BONE LOSS: ICD-10-CM

## 2019-09-16 DIAGNOSIS — C18.2 MALIGNANT NEOPLASM OF ASCENDING COLON (HCC): ICD-10-CM

## 2019-09-16 RX ORDER — NEBIVOLOL 5 MG/1
5 TABLET ORAL DAILY
Qty: 90 TAB | Refills: 3
Start: 2019-09-16 | End: 2019-10-07 | Stop reason: SDUPTHER

## 2019-09-16 NOTE — PROGRESS NOTES
Liberty Zaman is a 80 y.o. female who presents for evaluation of npv, awv. Moved to [facility] from [facility] in jan 2019. Had been feeling poorly since march or so, increased fatigue, sob/mitchell. Eventually went to [facility] ed on June 28, was found to be anemic, and had colonic mass. Admitted from June 28-July 9, had right hemicolectomy. Then went to [facility], d/c to home aug 13. Lives with her daughter and her family now, and is doing quite well. Her son in law is  with [employer]. Follow bp 2x per day, and has been elevated, consistently 140-155/.  meds were adjusted at d/c from hospital, decreased dose of bystolic and diovan/hctz.       ROS:  Constitutional: negative for fevers, chills, anorexia and weight loss  Eyes:   negative for visual disturbance and irritation  ENT:   negative for tinnitus,sore throat,nasal congestion,ear pain,hoarseness  Respiratory:  negative for cough, hemoptysis, dyspnea,wheezing  CV:   negative for chest pain, palpitations, lower extremity edema  GI:   negative for nausea, vomiting, diarrhea, abdominal pain,melena  Genitourinary: negative for frequency, dysuria and hematuria  Musculoskel: negative for myalgias, arthralgias, back pain, muscle weakness, joint pain  Neurological:  negative for headaches, dizziness, focal weakness, numbness  Psychiatric:     Negative for depression or anxiety      Past Medical History:   Diagnosis Date    Hypertension        Past Surgical History:   Procedure Laterality Date    COLONOSCOPY N/A 7/1/2019    COLONOSCOPY performed by Nik Chiang MD at [facility] ENDOSCOPY    COLONOSCOPY,DIAGNOSTIC  7/1/2019         COLONOSCPY,FLEX,W/ [address] INJECT  7/1/2019         HX ORTHOPAEDIC      tia. hip replacement    UPPER GI ENDOSCOPY,BIOPSY  7/1/2019            Family History   Problem Relation Age of Onset    Stroke Mother 80    Stomach Cancer Father 78    Breast Cancer Sister 80       Social History     Socioeconomic History    Marital status:      Spouse name: Not on file    Number of children: Not on file    Years of education: Not on file    Highest education level: Not on file   Occupational History    Not on file   Social Needs    Financial resource strain: Not on file    Food insecurity:     Worry: Not on file     Inability: Not on file    Transportation needs:     Medical: Not on file     Non-medical: Not on file   Tobacco Use    Smoking status: Never Smoker    Smokeless tobacco: Never Used   Substance and Sexual Activity    Alcohol use: Never     Frequency: Never    Drug use: Never    Sexual activity: Not Currently   Lifestyle    Physical activity:     Days per week: Not on file     Minutes per session: Not on file    Stress: Not on file   Relationships    Social connections:     Talks on phone: Not on file     Gets together: Not on file     Attends Muslim service: Not on file     Active member of club or organization: Not on file     Attends meetings of clubs or organizations: Not on file     Relationship status: Not on file    Intimate partner violence:     Fear of current or ex partner: Not on file     Emotionally abused: Not on file     Physically abused: Not on file     Forced sexual activity: Not on file   Other Topics Concern    Not on file   Social History Narrative    Not on file            Visit Vitals  /73 (BP 1 Location: Left arm, BP Patient Position: Sitting)   Pulse (!) 58   Temp 97.5 °F (36.4 °C) (Oral)   Resp 16   Ht 5' 2\" (1.575 m)   Wt 156 lb 9.6 oz (71 kg)   SpO2 97%   BMI 28.64 kg/m²       Physical Examination:   General - Well appearing female  HEENT - PERRL, TM no erythema/opacification, normal nasal turbinates, no oropharyngeal erythema or exudate, MMM  Neck - supple, no bruits, no thyroidomegaly, no lymphadenopathy  Pulm - clear to auscultation bilaterally  Cardio - RRR, normal S1 S2, no murmur  Abd - soft, nontender, no masses, no HSM  Extrem - no edema, +2 distal pulses  Neuro- No focal deficits, CN intact     Assessment/Plan:    1.  htn--increase bystolic back to 5 mg. Continue diovan/hctz, and follow at home  2. Colon cancer sp right hemicolectomy--follows with dr Mario Duarte, dr Jennifer Patton. Send lab results to her      Referral to dr Lanie Mantilla for glaucoma screen. dexa scan ordered. She will come back for flu shot on a Thursday. rtc 3 months, follow bp        Sherill Mu III, DO              This is an Initial Medicare Annual Wellness Exam (AWV) (Performed 12 months after IPPE or effective date of Medicare Part B enrollment, Once in a lifetime)    I have reviewed the patient's medical history in detail and updated the computerized patient record. History     Past Medical History:   Diagnosis Date    Hypertension       Past Surgical History:   Procedure Laterality Date    COLONOSCOPY N/A 7/1/2019    COLONOSCOPY performed by Jero Bess MD at \[facility]"" ENDOSCOPY    COLONOSCOPY,DIAGNOSTIC  7/1/2019         COLONOSCPY,FLEX,W/DIR [address]BMUC INJECT  7/1/2019         HX ORTHOPAEDIC      tia. hip replacement    UPPER GI ENDOSCOPY,BIOPSY  7/1/2019          Current Outpatient Medications   Medication Sig Dispense Refill    BYSTOLIC 2.5 mg tablet TAKE 1 TABLET BY MOUTH EVERY DAY 30 Tab 0    folic acid (FOLVITE) 1 mg tablet TAKE 1 TABLET BY MOUTH ONCE A DAY 30 Tab 0    ferrous sulfate 325 mg (65 mg iron) tablet Take 1 Tab by mouth Daily (before breakfast). (Patient taking differently: Take 325 mg by mouth two (2) times a day.) 30 Tab 1    valsartan-hydroCHLOROthiazide (DIOVAN-HCT) 160-12.5 mg per tablet Take 1 Tab by mouth daily. Allergies   Allergen Reactions    Diclofenac Other (comments)     Forgot reaction    Hydrocodone Other (comments)     Forgot reaction    Lisinopril Cough    Oxycodone Other (comments)     Forgot reaction    Promethazine Other (comments)     Pt.  Forgot the reaction    Tramadol Other (comments)     Forgot reaction     Family History   Problem Relation Age of Onset    Stroke Mother 80    Stomach Cancer Father 78    Breast Cancer Sister 80     Social History     Tobacco Use    Smoking status: Never Smoker    Smokeless tobacco: Never Used   Substance Use Topics    Alcohol use: Never     Frequency: Never     Patient Active Problem List   Diagnosis Code    Anemia D64.9    S/P right colectomy Z90.49    Colon cancer ([facility] [address] C18.9    HTN (hypertension) I10       Depression Risk Factor Screening:     3 most recent PHQ Screens 9/16/2019   Little interest or pleasure in doing things Not at all   Feeling down, depressed, irritable, or hopeless Not at all   Total Score PHQ 2 0     Alcohol Risk Factor Screening: You do not drink alcohol or very rarely. Functional Ability and Level of Safety:     Hearing Loss  The patient needs further evaluation. Activities of Daily Living  The home contains: grab bars and cane, shower chair  Patient needs help with:  transportation    Fall Risk  Fall Risk Assessment, last 12 mths 9/16/2019   Able to walk? Yes   Fall in past 12 months? No       Abuse Screen  Patient is not abused. Lives with daughter and her family. Cognitive Screening   Evaluation of Cognitive Function:  Has your family/caregiver stated any concerns about your memory: yes  Normal    Patient Care Team   Patient Care Team:  Marsha Farfan DO as PCP - General (Internal Medicine)  Sasha Pinedo as Care Manager  Barb House RN as Transitional Nurse Navigator    Assessment/Plan   Education and counseling provided:  Are appropriate based on today's review and evaluation  End-of-Life planning (with patient's consent)  Pneumococcal Vaccine  Influenza Vaccine  Bone mass measurement (DEXA)  Screening for glaucoma    Diagnoses and all orders for this visit:    1.  Initial Medicare annual wellness visit         Health Maintenance Due   Topic Date Due    DTaP/Tdap/Td series (1 - Tdap) 06/06/1953    Shingrix Vaccine Age 50> (1 of 2) 06/06/1982    GLAUCOMA SCREENING Q2Y  06/06/1997    Bone Densitometry (Dexa) Screening  06/06/1997    Pneumococcal 65+ years (2 of 2 - PCV13) 12/12/2008    MEDICARE YEARLY EXAM  06/28/2019    Influenza Age 9 to Adult  08/01/2019

## 2019-09-16 NOTE — PROGRESS NOTES
Reviewed record in preparation for visit and have obtained necessary documentation. Identified pt with two pt identifiers(name and ). Chief Complaint   Patient presents with   [address] Annual Wellness Visit       Health Maintenance Due   Topic Date Due    DTaP/Tdap/Td series (1 - Tdap) 1953    Shingrix Vaccine Age 50> (1 of 2) 1982    GLAUCOMA SCREENING Q2Y  1997    Bone Densitometry (Dexa) Screening  1997    Pneumococcal 65+ years (2 of 2 - PPSV23) 1997    MEDICARE YEARLY EXAM  2019    Influenza Age 9 to Adult  2019       Ms. Jose Alfredo Devries has a reminder for a \"due or due soon\" health maintenance. I have asked that she discuss health maintenance topic(s) due with Her  primary care provider. Coordination of Care Questionnaire:  :     1) Have you been to an emergency room, urgent care clinic since your last visit? no   Hospitalized since your last visit? no             2) Have you seen or consulted any other health care providers outside of [address] [place] [address] since your last visit? yes  (Include any pap smears or colon screenings in this section.)    3) Do you have an Advance Directive on file? yes    4) Are you interested in receiving information on Advance Directives? NO    Patient is accompanied by self I have received verbal consent from Isis Rosado to discuss any/all medical information while they are present in the room.

## 2019-09-16 NOTE — PATIENT INSTRUCTIONS
Medicare Wellness Visit, Female     The best way to live healthy is to have a lifestyle where you eat a well-balanced diet, exercise regularly, limit alcohol use, and quit all forms of tobacco/nicotine, if applicable. Regular preventive services are another way to keep healthy. Preventive services (vaccines, screening tests, monitoring & exams) can help personalize your care plan, which helps you manage your own care. Screening tests can find health problems at the earliest stages, when they are easiest to treat. [facility] follows the current, evidence-based guidelines published by the [facility] Son[facility] ([facility]) when recommending preventive services for our patients. Because we follow these guidelines, sometimes recommendations change over time as research supports it. (For example, mammograms used to be recommended annually. Even though Medicare will still pay for an annual mammogram, the newer guidelines recommend a mammogram every two years for women of average risk.)  Of course, you and your doctor may decide to screen more often for some diseases, based on your risk and your health status. Preventive services for you include:  - Medicare offers their members a free annual wellness visit, which is time for you and your primary care provider to discuss and plan for your preventive service needs. Take advantage of this benefit every year!  -All adults over the age of 72 should receive the recommended pneumonia vaccines. Current USPSTF guidelines recommend a series of two vaccines for the best pneumonia protection.   -All adults should have a flu vaccine yearly and a tetanus vaccine every 10 years. All adults age 61 and older should receive a shingles vaccine once in their lifetime.    -A bone mass density test is recommended when a woman turns 65 to screen for osteoporosis. This test is only recommended one time, as a screening.  Some providers will use this same test as a disease monitoring tool if you already have osteoporosis. -All adults age 38-68 who are overweight should have a diabetes screening test once every three years.   -Other screening tests and preventive services for persons with diabetes include: an eye exam to screen for diabetic retinopathy, a kidney function test, a foot exam, and stricter control over your cholesterol.   -Cardiovascular screening for adults with routine risk involves an electrocardiogram (ECG) at intervals determined by your doctor.   -Colorectal cancer screenings should be done for adults age 54-65 with no increased risk factors for colorectal cancer. There are a number of acceptable methods of screening for this type of cancer. Each test has its own benefits and drawbacks. Discuss with your doctor what is most appropriate for you during your annual wellness visit. The different tests include: colonoscopy (considered the best screening method), a fecal occult blood test, a fecal DNA test, and sigmoidoscopy. -Breast cancer screenings are recommended every other year for women of normal risk, age 54-69.  -Cervical cancer screenings for women over age 72 are only recommended with certain risk factors.   -All adults born between [facility] should be screened once for Hepatitis C.      Here is a list of your current Health Maintenance items (your personalized list of preventive services) with a due date:  Health Maintenance Due   Topic Date Due    DTaP/Tdap/Td  (1 - Tdap) 06/06/1953    Shingles Vaccine (1 of 2) 06/06/1982    Glaucoma Screening   06/06/1997    Bone Mineral Density   06/06/1997    Pneumococcal Vaccine (2 of 2 - PCV13) 12/12/2008    Annual Well Visit  06/28/2019    Flu Vaccine  08/01/2019

## 2019-09-23 ENCOUNTER — HOSPITAL ENCOUNTER (OUTPATIENT)
Dept: BONE DENSITY | Age: 84
Discharge: HOME | End: 2019-09-23
Attending: INTERNAL MEDICINE
Payer: MEDICARE

## 2019-09-23 ENCOUNTER — HOSPITAL ENCOUNTER (OUTPATIENT)
Dept: LAB | Age: 84
Discharge: HOME | End: 2019-09-23
Payer: MEDICARE

## 2019-09-23 DIAGNOSIS — M81.0 POSTMENOPAUSAL BONE LOSS: ICD-10-CM

## 2019-09-23 PROCEDURE — 85025 COMPLETE CBC W/AUTO DIFF WBC: CPT

## 2019-09-23 PROCEDURE — 77080 DXA BONE DENSITY AXIAL: CPT

## 2019-09-23 PROCEDURE — 80061 LIPID PANEL: CPT

## 2019-09-23 PROCEDURE — 80053 COMPREHEN METABOLIC PANEL: CPT

## 2019-09-23 PROCEDURE — 84443 ASSAY THYROID STIM HORMONE: CPT

## 2019-09-23 PROCEDURE — 36415 COLL VENOUS BLD VENIPUNCTURE: CPT

## 2019-09-23 PROCEDURE — 83036 HEMOGLOBIN GLYCOSYLATED A1C: CPT

## 2019-09-24 LAB
ALBUMIN SERPL-MCNC: 3.7 G/DL (ref 3.5–4.7)
ALBUMIN/GLOB SERPL: 1.2 {RATIO} (ref 1.2–2.2)
ALP SERPL-CCNC: 87 IU/L (ref 39–117)
ALT SERPL-CCNC: 12 IU/L (ref 0–32)
AST SERPL-CCNC: 19 IU/L (ref 0–40)
BASOPHILS # BLD AUTO: 0.1 X10E3/UL (ref 0–0.2)
BASOPHILS NFR BLD AUTO: 1 %
BILIRUB SERPL-MCNC: 0.2 MG/DL (ref 0–1.2)
BUN SERPL-MCNC: 14 MG/DL (ref 8–27)
BUN/CREAT SERPL: 19 (ref 12–28)
CALCIUM SERPL-MCNC: 9.9 MG/DL (ref 8.7–10.3)
CHLORIDE SERPL-SCNC: 101 MMOL/L (ref 96–106)
CHOLEST SERPL-MCNC: 221 MG/DL (ref 100–199)
CO2 SERPL-SCNC: 27 MMOL/L (ref 20–29)
CREAT SERPL-MCNC: 0.75 MG/DL (ref 0.57–1)
EOSINOPHIL # BLD AUTO: 0.3 X10E3/UL (ref 0–0.4)
EOSINOPHIL NFR BLD AUTO: 5 %
ERYTHROCYTE [DISTWIDTH] IN BLOOD BY AUTOMATED COUNT: 19.4 % (ref 12.3–15.4)
EST. AVERAGE GLUCOSE BLD GHB EST-MCNC: 117 MG/DL
GLOBULIN SER CALC-MCNC: 3.1 G/DL (ref 1.5–4.5)
GLUCOSE SERPL-MCNC: 101 MG/DL (ref 65–99)
HBA1C MFR BLD: 5.7 % (ref 4.8–5.6)
HCT VFR BLD AUTO: 38.8 % (ref 34–46.6)
HDLC SERPL-MCNC: 46 MG/DL
HGB BLD-MCNC: 12.5 G/DL (ref 11.1–15.9)
IMM GRANULOCYTES # BLD AUTO: 0 X10E3/UL (ref 0–0.1)
IMM GRANULOCYTES NFR BLD AUTO: 0 %
LDLC SERPL CALC-MCNC: 141 MG/DL (ref 0–99)
LYMPHOCYTES # BLD AUTO: 3 X10E3/UL (ref 0.7–3.1)
LYMPHOCYTES NFR BLD AUTO: 47 %
MCH RBC QN AUTO: 26.9 PG (ref 26.6–33)
MCHC RBC AUTO-ENTMCNC: 32.2 G/DL (ref 31.5–35.7)
MCV RBC AUTO: 84 FL (ref 79–97)
MONOCYTES # BLD AUTO: 0.7 X10E3/UL (ref 0.1–0.9)
MONOCYTES NFR BLD AUTO: 11 %
NEUTROPHILS # BLD AUTO: 2.3 X10E3/UL (ref 1.4–7)
NEUTROPHILS NFR BLD AUTO: 36 %
PLATELET # BLD AUTO: 250 X10E3/UL (ref 150–450)
POTASSIUM SERPL-SCNC: 4.4 MMOL/L (ref 3.5–5.2)
PROT SERPL-MCNC: 6.8 G/DL (ref 6–8.5)
RBC # BLD AUTO: 4.64 X10E6/UL (ref 3.77–5.28)
SODIUM SERPL-SCNC: 142 MMOL/L (ref 134–144)
TRIGL SERPL-MCNC: 170 MG/DL (ref 0–149)
TSH SERPL DL<=0.005 MIU/L-ACNC: 2.22 UIU/ML (ref 0.45–4.5)
VLDLC SERPL CALC-MCNC: 34 MG/DL (ref 5–40)
WBC # BLD AUTO: 6.4 X10E3/UL (ref 3.4–10.8)

## 2019-09-24 NOTE — PROGRESS NOTES
Labs look good. No longer anemic, and liver tests also back to normal.  Cholesterol levels bit elevated, but don't think she needs any new meds for that. Please send/fax results to dr Zhen Issa as well.

## 2019-09-25 ENCOUNTER — TELEPHONE (OUTPATIENT)
Dept: INTERNAL MEDICINE CLINIC | Age: 84
End: 2019-09-25

## 2019-09-25 NOTE — TELEPHONE ENCOUNTER
Sergio, [address]             Patient return call     Caller's first and last name and relationship (if not the patient):       Best contact number(s): [phone]       Whose call is being returned: Zoila Beltre       Details to clarify the request: Please call back       Dequan Session     Message received & copied from [facility]

## 2019-09-25 NOTE — TELEPHONE ENCOUNTER
Called and spoke with pt's daughter, Germán Bartlett. Notified eGrmán Bartlett of pt's lab results. Germán Bartlett verbalized understanding of information discussed w/ no further questions at this time.

## 2019-09-25 NOTE — PROGRESS NOTES
Called and spoke with pt's daughter, Medhat Doctor. Notified Lonell Doctor of pt's lab results. Lonell Doctor verbalized understanding of information discussed w/ no further questions at this time.

## 2019-09-27 NOTE — PROGRESS NOTES
Bone scan shows osteopenia, so bones are bit weaker than normal.  However, given her skeletal frame, I don't think she needs any treatment. Continue with exercise.

## 2019-10-04 ENCOUNTER — TELEPHONE (OUTPATIENT)
Dept: INTERNAL MEDICINE CLINIC | Age: 84
End: 2019-10-04

## 2019-10-04 NOTE — TELEPHONE ENCOUNTER
Called and spoke with pt's daughter, Frida Valentine. Notified Frida Valentine of pt's results per Dr. Rebeca Loera. Frida Valentine verbalized understanding of information discussed w/ no further questions at this time.

## 2019-10-04 NOTE — PROGRESS NOTES
Called and spoke with pt's daughter, Elige Apley. Notified Elige Apley of pt's results per Dr. Clement Erickson. Elige Apley verbalized understanding of information discussed w/ no further questions at this time.

## 2019-10-04 NOTE — TELEPHONE ENCOUNTER
----- Message from Gilford Sines sent at 10/4/2019  4:27 PM EDT -----  Regarding: Dr. Ashkan Vargas request for a call back from the practice. She just a missed a call. Best contact number is [phone].       Copy/paste envera

## 2019-10-07 ENCOUNTER — TELEPHONE (OUTPATIENT)
Dept: INTERNAL MEDICINE CLINIC | Age: 84
End: 2019-10-07

## 2019-10-07 RX ORDER — NEBIVOLOL 5 MG/1
5 TABLET ORAL DAILY
Qty: 90 TAB | Refills: 3 | Status: SHIPPED | OUTPATIENT
Start: 2019-10-07 | End: 2020-09-29 | Stop reason: SDUPTHER

## 2019-10-07 RX ORDER — FOLIC ACID 1 MG/1
TABLET ORAL
Qty: 90 TAB | Refills: 3 | Status: SHIPPED | OUTPATIENT
Start: 2019-10-07 | End: 2020-01-06 | Stop reason: SDUPTHER

## 2019-10-07 RX ORDER — VALSARTAN AND HYDROCHLOROTHIAZIDE 160; 12.5 MG/1; MG/1
1 TABLET, FILM COATED ORAL DAILY
Qty: 90 TAB | Refills: 3 | Status: SHIPPED | OUTPATIENT
Start: 2019-10-07 | End: 2020-09-29 | Stop reason: SDUPTHER

## 2019-10-07 NOTE — TELEPHONE ENCOUNTER
Called, spoke to pt. Two identifiers confirmed. Notified pt of Dr. Mahad Flores recommendations. Pt verbalized understanding of information discussed w/ no further questions at this time.

## 2019-10-07 NOTE — TELEPHONE ENCOUNTER
Called, spoke to pt. Two identifiers confirmed. Pt stated her BP's have been elevated the past couple weeks. Pt stated this morning prior to taking medications, her BP was 154/66. After medications, BP was 160/60. Pt also concerned because she is noticing her hair is coming out in clumps in the shower and after brushing her hair. Notified pt I would send message to Dr. Alex Amor.   Pt verbalized understanding of information discussed w/ no further questions at this time.

## 2019-10-07 NOTE — TELEPHONE ENCOUNTER
Maxi Valdez III, DO  You 44 minutes ago (12:45 PM)     Refills done. Not unusual to have some hair loss at age 80, even for women. Cortez Bamberg labs looked fine.  Suspect still a result of stress. Routing comment      Called, left vm for pt to return call to office.

## 2019-10-07 NOTE — TELEPHONE ENCOUNTER
Patient requests a call back to discuss her medications & to discuss her Health Concerns . Please call.  Thank you

## 2019-10-17 RX ORDER — FOLIC ACID 1 MG/1
TABLET ORAL
Qty: 30 TAB | Refills: 0 | Status: SHIPPED | OUTPATIENT
Start: 2019-10-17 | End: 2019-11-21 | Stop reason: SDUPTHER

## 2019-11-13 ENCOUNTER — TELEPHONE (OUTPATIENT)
Dept: INTERNAL MEDICINE CLINIC | Age: 84
End: 2019-11-13

## 2019-11-13 NOTE — TELEPHONE ENCOUNTER
#[phone] Lisa (on [facility]) states they believe pt has a hernia as she has a protrusion in upper stomach that is uncomfortable. Please call to schedule pt. Thanks.

## 2019-11-13 NOTE — TELEPHONE ENCOUNTER
Spoke with pt's daughter, Alfredo Barrios. Appointment scheduled for 11/21 @ [phone] with Dr. Herb Kearney verbalized understanding of information discussed w/ no further questions at this time.

## 2019-11-21 ENCOUNTER — OFFICE VISIT (OUTPATIENT)
Dept: INTERNAL MEDICINE CLINIC | Age: 84
End: 2019-11-21

## 2019-11-21 VITALS
RESPIRATION RATE: 16 BRPM | OXYGEN SATURATION: 98 % | WEIGHT: 161 LBS | DIASTOLIC BLOOD PRESSURE: 60 MMHG | HEART RATE: 57 BPM | SYSTOLIC BLOOD PRESSURE: 110 MMHG | HEIGHT: 62 IN | BODY MASS INDEX: 29.63 KG/M2 | TEMPERATURE: 97.6 F

## 2019-11-21 DIAGNOSIS — C18.2 MALIGNANT NEOPLASM OF ASCENDING COLON (HCC): ICD-10-CM

## 2019-11-21 DIAGNOSIS — K43.2 INCISIONAL HERNIA OF ANTERIOR ABDOMINAL WALL WITHOUT OBSTRUCTION OR GANGRENE: Primary | ICD-10-CM

## 2019-11-21 DIAGNOSIS — M85.80 OSTEOPENIA, UNSPECIFIED LOCATION: ICD-10-CM

## 2019-11-21 DIAGNOSIS — Z90.49 S/P RIGHT COLECTOMY: ICD-10-CM

## 2019-11-21 DIAGNOSIS — I10 ESSENTIAL HYPERTENSION: ICD-10-CM

## 2019-11-21 RX ORDER — DIAPER,BRIEF,INFANT-TODD,DISP
EACH MISCELLANEOUS DAILY
COMMUNITY

## 2019-11-21 NOTE — PROGRESS NOTES
Reviewed record in preparation for visit and have obtained necessary documentation. Identified pt with two pt identifiers(name and ). Chief Complaint   Patient presents with    Possible Hernia     swelling; x5 weeks       Health Maintenance Due   Topic Date Due    Shingrix Vaccine Age 49> (1 of 2) 1982    GLAUCOMA SCREENING Q2Y  1997    Influenza Age 5 to Adult  2019       Ms. Frannie Otto has a reminder for a \"due or due soon\" health maintenance. I have asked that she discuss health maintenance topic(s) due with Her  primary care provider. Coordination of Care Questionnaire:  :     1) Have you been to an emergency room, urgent care clinic since your last visit? no   Hospitalized since your last visit? no             2) Have you seen or consulted any other health care providers outside of [address] [place] [address] since your last visit? no  (Include any pap smears or colon screenings in this section.)    3) Do you have an Advance Directive on file? yes    4) Are you interested in receiving information on Advance Directives? NO    Patient is accompanied by self I have received verbal consent from Isis Montiel to discuss any/all medical information while they are present in the room.

## 2019-11-21 NOTE — PATIENT INSTRUCTIONS
Office Policies Phone calls/patient messages: Please allow up to 24 hours for someone in the office to contact you about your call or message. Be mindful your provider may be out of the office or your message may require further review. We encourage you to use flikdate for your messages as this is a faster, more efficient way to communicate with our office Medication Refills: 
         
Prescription medications require 48-72 business hours to process. We encourage you to use flikdate for your refills. For controlled medications: Please allow 72 business hours to process. Certain medications may require you to  a written prescription at our office. NO narcotic/controlled medications will be prescribed after 4pm Monday through Friday or on weekends Form/Paperwork Completion: 
         
Please note a $25 fee may incur for all paperwork for completed by our providers. We ask that you allow 7-10 business days. Pre-payment is due prior to picking up/faxing the completed form. You may also download your forms to flikdate to have your doctor print off. Abdominal Hernia Repair: Before Your Surgery What is abdominal hernia repair surgery? An abdominal hernia repair is a type of surgery. It fixes a problem called a hernia. A hernia is a bulge under the skin in your belly. It happens when you have a weak spot in your belly muscles and a piece of your intestines or tissues pokes through your muscles. This can cause pain. You may notice the pain most when you lift something heavy. You can have a hernia near your belly button. Or it may be in a scar from an earlier surgery. To fix it, the doctor will do one of two kinds of surgery. In open surgery, the doctor makes one cut near the hernia. This cut is called an incision.  In laparoscopic surgery, the doctor makes several very small incisions and uses a thin, lighted scope and small tools. In either type of surgery, the doctor pushes the bulge back in place, if needed. Then the doctor sews the healthy tissue back together. Often the doctor patches the weak spot with a piece of material. 
Laparoscopic surgery leaves several small scars. Open surgery leaves one long scar. The scars fade with time. You will probably need to take 1 to 2 weeks off from work. But if your job requires heavy lifting or other physical work, you may need to take 4 to 6 weeks off. Follow-up care is a key part of your treatment and safety. Be sure to make and go to all appointments, and call your doctor if you are having problems. It's also a good idea to know your test results and keep a list of the medicines you take. What happens before surgery? 
 Surgery can be stressful. This information will help you understand what you can expect. And it will help you safely prepare for surgery. 
 Preparing for surgery 
  · Understand exactly what surgery is planned, along with the risks, benefits, and other options. · Tell your doctors ALL the medicines, vitamins, supplements, and herbal remedies you take. Some of these can increase the risk of bleeding or interact with anesthesia.  
  · If you take blood thinners, such as warfarin (Coumadin), clopidogrel (Plavix), or aspirin, be sure to talk to your doctor. He or she will tell you if you should stop taking these medicines before your surgery. Make sure that you understand exactly what your doctor wants you to do.  
  · Your doctor will tell you which medicines to take or stop before your surgery. You may need to stop taking certain medicines a week or more before surgery. So talk to your doctor as soon as you can.  
  · If you have an advance directive, let your doctor know. It may include a living will and a durable power of  for health care. Bring a copy to the hospital. If you don't have one, you may want to prepare one. It lets your doctor and loved ones know your health care wishes. Doctors advise that everyone prepare these papers before any type of surgery or procedure. What happens on the day of surgery? · Follow the instructions exactly about when to stop eating and drinking. If you don't, your surgery may be canceled. If your doctor told you to take your medicines on the day of surgery, take them with only a sip of water.  
  · Take a bath or shower before you come in for your surgery. Do not apply lotions, perfumes, deodorants, or nail polish.  
  · Do not shave the surgical site yourself.  
  · Take off all jewelry and piercings. And take out contact lenses, if you wear them.  
 At the hospital or surgery center · Bring a picture ID.  
  · The area for surgery is often marked to make sure there are no errors.  
  · You will be kept comfortable and safe by your anesthesia provider. You will be asleep during the surgery.  
  · The surgery will take 30 minutes to 2 hours. It depends on how large the hernia is and where it is. Going home · Be sure you have someone to drive you home. Anesthesia and pain medicine make it unsafe for you to drive.  
  · You will be given more specific instructions about recovering from your surgery. They will cover things like diet, wound care, follow-up care, driving, and getting back to your normal routine. When should you call your doctor? · You have questions or concerns.  
  · You don't understand how to prepare for your surgery.  
  · You become ill before the surgery (such as fever, flu, or a cold).  
  · You need to reschedule or have changed your mind about having the surgery. Where can you learn more? Go to http://mynor-randi.info/. Enter U288 in the search box to learn more about \"Abdominal Hernia Repair: Before Your Surgery. \" Current as of: November 7, 2018 Content Version: 12.2 © 3126-1593 Healthwise, Incorporated. Care instructions adapted under license by [employer] (which disclaims liability or warranty for this information). If you have questions about a medical condition or this instruction, always ask your healthcare professional. [address] any warranty or liability for your use of this information. Would try an abdominal binder or corset to keep your abdomen in place.

## 2019-11-21 NOTE — PROGRESS NOTES
Anuradha Mukherjee is a 80 y.o. female who presents for evaluation of lump/bulge in her abd. Last seen by me sept 16, 2019 in [facility]. Overall doing well, had hemicolectomy for colon cancer earlier this year. Has noticed increased bulge in abd area, in particular at incision site from her prior sx. No pain. Moves bowels fine.       ROS:  Constitutional: negative for fevers, chills, anorexia and weight loss  Eyes:   negative for visual disturbance and irritation  ENT:   negative for tinnitus,sore throat,nasal congestion,ear pain,hoarseness  Respiratory:  negative for cough, hemoptysis, dyspnea,wheezing  CV:   negative for chest pain, palpitations, lower extremity edema  GI:   negative for nausea, vomiting, diarrhea, abdominal pain,melena  Genitourinary: negative for frequency, dysuria and hematuria  Musculoskel: negative for myalgias, arthralgias, back pain, muscle weakness, joint pain  Neurological:  negative for headaches, dizziness, focal weakness, numbness  Psychiatric:     Negative for depression or anxiety      Past Medical History:   Diagnosis Date    Hypertension        Past Surgical History:   Procedure Laterality Date    COLONOSCOPY N/A 7/1/2019    [facility] performed [facility]COPY    COLONOSCOPY,DIAGNOSTIC  7/1/2019         COLONOSCPY,FLEX,W/ [address] INJECT  7/1/2019         HX ORTHOPAEDIC      tia. hip replacement    UPPER GI ENDOSCOPY,BIOPSY  7/1/2019            Family History   Problem Relation Age of Onset    Stroke Mother 80    Stomach Cancer Father 78    Breast Cancer Sister 80       Social History     Socioeconomic History    Marital status:      Spouse name: Not on file    Number of children: Not on file    Years of education: Not on file    Highest education level: Not on file   Occupational History    Not on file   Social Needs    Financial resource strain: Not on file    Food insecurity:     Worry: Not on file     Inability: Not on file   [address] [place] [address] Ted Transportation needs:     Medical: Not on file     Non-medical: Not on file   Tobacco Use    Smoking status: Never Smoker    Smokeless tobacco: Never Used   Substance and Sexual Activity    Alcohol use: Never     Frequency: Never    Drug use: Never    Sexual activity: Not Currently   Lifestyle    Physical activity:     Days per week: Not on file     Minutes per session: Not on file    Stress: Not on file   Relationships    Social connections:     Talks on phone: Not on file     Gets together: Not on file     Attends Hoahaoism service: Not on file     Active member of club or organization: Not on file     Attends meetings of clubs or organizations: Not on file     Relationship status: Not on file    Intimate partner violence:     Fear of current or ex partner: Not on file     Emotionally abused: Not on file     Physically abused: Not on file     Forced sexual activity: Not on file   Other Topics Concern    Not on file   Social History Narrative    Not on file            Visit Vitals  /60 (BP 1 Location: Right arm, BP Patient Position: Sitting)   Pulse (!) 57   Temp 97.6 °F (36.4 °C) (Oral)   Resp 16   Ht 5' 2\" (1.575 m)   Wt 161 lb (73 kg)   SpO2 98%   BMI 29.45 kg/m²       Physical Examination:   General - Well appearing female  HEENT - PERRL, TM no erythema/opacification, normal nasal turbinates, no oropharyngeal erythema or exudate, MMM  Neck - supple, no bruits, no thyroidomegaly, no lymphadenopathy  Pulm - clear to auscultation bilaterally  Cardio - RRR, normal S1 S2, no murmur  Abd - soft, nontender, no masses, no HSM.  ++ant wall incisional hernia. Easily reduceable. Extrem - no edema, +2 distal pulses  Neuro-  No focal deficits, CN intact     Assessment/Plan:    1.  abd wall incisional hernia--referral to gen sx dr Angel Gutierres. Can use binder or corset, as she is not very interested in having any more surgeries. I don't think this is a hernia that needs to be fixed.   2. htn--controlled here, continue bystolic, diovan/hctz  3. Colon cancer--sp right hemicolectomy  4. Osteopenia--    rx given for shingrix.   rtc for regular visit        Nathalie Case III, DO

## 2020-01-06 ENCOUNTER — OFFICE VISIT (OUTPATIENT)
Dept: INTERNAL MEDICINE CLINIC | Age: 85
End: 2020-01-06

## 2020-01-06 VITALS
SYSTOLIC BLOOD PRESSURE: 132 MMHG | TEMPERATURE: 97.4 F | BODY MASS INDEX: 30.11 KG/M2 | HEIGHT: 62 IN | HEART RATE: 61 BPM | OXYGEN SATURATION: 61 % | DIASTOLIC BLOOD PRESSURE: 68 MMHG | RESPIRATION RATE: 16 BRPM | WEIGHT: 163.6 LBS

## 2020-01-06 DIAGNOSIS — K43.2 INCISIONAL HERNIA OF ANTERIOR ABDOMINAL WALL WITHOUT OBSTRUCTION OR GANGRENE: ICD-10-CM

## 2020-01-06 DIAGNOSIS — M85.80 OSTEOPENIA, UNSPECIFIED LOCATION: ICD-10-CM

## 2020-01-06 DIAGNOSIS — I10 ESSENTIAL HYPERTENSION: Primary | ICD-10-CM

## 2020-01-06 DIAGNOSIS — R68.89 FORGETFULNESS: ICD-10-CM

## 2020-01-06 DIAGNOSIS — C18.2 MALIGNANT NEOPLASM OF ASCENDING COLON (HCC): ICD-10-CM

## 2020-01-06 RX ORDER — FOLIC ACID 1 MG/1
TABLET ORAL
Qty: 90 TAB | Refills: 3 | Status: SHIPPED | OUTPATIENT
Start: 2020-01-06 | End: 2020-06-28

## 2020-01-06 NOTE — PATIENT INSTRUCTIONS
Office Policies    Phone calls/patient messages:            Please allow up to 24 hours for someone in the office to contact you about your call or message. Be mindful your provider may be out of the office or your message may require further review. We encourage you to use ProtoShare for your messages as this is a faster, more efficient way to communicate with our office                         Medication Refills:            Prescription medications require 48-72 business hours to process. We encourage you to use ProtoShare for your refills. For controlled medications: Please allow 72 business hours to process. Certain medications may require you to  a written prescription at our office. NO narcotic/controlled medications will be prescribed after 4pm Monday through Friday or on weekends              Form/Paperwork Completion:            Please note a $25 fee may incur for all paperwork for completed by our providers. We ask that you allow 7-10 business days. Pre-payment is due prior to picking up/faxing the completed form. You may also download your forms to ProtoShare to have your doctor print off. DASH Diet: Care Instructions  Your Care Instructions    The DASH diet is an eating plan that can help lower your blood pressure. DASH stands for Dietary Approaches to Stop Hypertension. Hypertension is high blood pressure. The DASH diet focuses on eating foods that are high in calcium, potassium, and magnesium. These nutrients can lower blood pressure. The foods that are highest in these nutrients are fruits, vegetables, low-fat dairy products, nuts, seeds, and legumes. But taking calcium, potassium, and magnesium supplements instead of eating foods that are high in those nutrients does not have the same effect. The DASH diet also includes whole grains, fish, and poultry. The DASH diet is one of several lifestyle changes your doctor may recommend to lower your high blood pressure.  Your doctor may also want you to decrease the amount of sodium in your diet. Lowering sodium while following the DASH diet can lower blood pressure even further than just the DASH diet alone. Follow-up care is a key part of your treatment and safety. Be sure to make and go to all appointments, and call your doctor if you are having problems. It's also a good idea to know your test results and keep a list of the medicines you take. How can you care for yourself at home? Following the DASH diet  · Eat 4 to 5 servings of fruit each day. A serving is 1 medium-sized piece of fruit, ½ cup chopped or canned fruit, 1/4 cup dried fruit, or 4 ounces (½ cup) of fruit juice. Choose fruit more often than fruit juice. · Eat 4 to 5 servings of vegetables each day. A serving is 1 cup of lettuce or raw leafy vegetables, ½ cup of chopped or cooked vegetables, or 4 ounces (½ cup) of vegetable juice. Choose vegetables more often than vegetable juice. · Get 2 to 3 servings of low-fat and fat-free dairy each day. A serving is 8 ounces of milk, 1 cup of yogurt, or 1 ½ ounces of cheese. · Eat 6 to 8 servings of grains each day. A serving is 1 slice of bread, 1 ounce of dry cereal, or ½ cup of cooked rice, pasta, or cooked cereal. Try to choose whole-grain products as much as possible. · Limit lean meat, poultry, and fish to 2 servings each day. A serving is 3 ounces, about the size of a deck of cards. · Eat 4 to 5 servings of nuts, seeds, and legumes (cooked dried beans, lentils, and split peas) each week. A serving is 1/3 cup of nuts, 2 tablespoons of seeds, or ½ cup of cooked beans or peas. · Limit fats and oils to 2 to 3 servings each day. A serving is 1 teaspoon of vegetable oil or 2 tablespoons of salad dressing. · Limit sweets and added sugars to 5 servings or less a week. A serving is 1 tablespoon jelly or jam, ½ cup sorbet, or 1 cup of lemonade. · Eat less than 2,300 milligrams (mg) of sodium a day.  If you limit your sodium to 1,500 mg a day, you can lower your blood pressure even more. Tips for success  · Start small. Do not try to make dramatic changes to your diet all at once. You might feel that you are missing out on your favorite foods and then be more likely to not follow the plan. Make small changes, and stick with them. Once those changes become habit, add a few more changes. · Try some of the following:  ? Make it a goal to eat a fruit or vegetable at every meal and at snacks. This will make it easy to get the recommended amount of fruits and vegetables each day. ? Try yogurt topped with fruit and nuts for a snack or healthy dessert. ? Add lettuce, tomato, cucumber, and onion to sandwiches. ? Combine a ready-made pizza crust with low-fat mozzarella cheese and lots of vegetable toppings. Try using tomatoes, squash, spinach, broccoli, carrots, cauliflower, and onions. ? Have a variety of cut-up vegetables with a low-fat dip as an appetizer instead of chips and dip. ? Sprinkle sunflower seeds or chopped almonds over salads. Or try adding chopped walnuts or almonds to cooked vegetables. ? Try some vegetarian meals using beans and peas. Add garbanzo or kidney beans to salads. Make burritos and tacos with mashed villanueva beans or black beans. Where can you learn more? Go to http://mynor-randi.info/. Enter V870 in the search box to learn more about \"DASH Diet: Care Instructions. \"  Current as of: April 9, 2019  Content Version: 12.2  © 9932-2571 [employer]. Care instructions adapted under license by [employer] (which disclaims liability or warranty for this information). If you have questions about a medical condition or this instruction, always ask your healthcare professional. Denise Vega any warranty or liability for your use of this information. Resume your additional valsartan as before.   Continue to monitor bp at home each am.

## 2020-01-06 NOTE — PROGRESS NOTES
Sree Mcpherson is a 80 y.o. female who presents for evaluation of routine follow up. Last seen by me nov 21, 2019. Daughter with her today. Overall feeling well, but bp remains labile. She checks it each am, and it ranges from 120-160/, but most readings are 140s/. She had been on an additional valsartan in the past, and would like to resume that. Daughter would also like her to be evaluated for memory issues. She opted to not see gen sx yet regarding her incisional abd wall hernia, it is completely reducible.       ROS:  Constitutional: negative for fevers, chills, anorexia and weight loss  Eyes:   negative for visual disturbance and irritation  ENT:   negative for tinnitus,sore throat,nasal congestion,ear pain,hoarseness  Respiratory:  negative for cough, hemoptysis, dyspnea,wheezing  CV:   negative for chest pain, palpitations, lower extremity edema  GI:   negative for nausea, vomiting, diarrhea, abdominal pain,melena  Genitourinary: negative for frequency, dysuria and hematuria  Musculoskel: negative for myalgias, arthralgias, back pain, muscle weakness, joint pain  Neurological:  negative for headaches, dizziness, focal weakness, numbness  Psychiatric:     Negative for depression or anxiety      Past Medical History:   Diagnosis Date    Hypertension        Past Surgical History:   Procedure Laterality Date    COLONOSCOPY N/A 7/1/2019    COL[facility] performed [facility] Con[facility] at [facility] ENDOSCOPY    COLONOSCOPY,DIAGNOSTIC  7/1/2019         COLONOSCPY,FLEX,W/ [address] INJECT  7/1/2019         HX ORTHOPAEDIC      tia. hip replacement    UPPER GI ENDOSCOPY,BIOPSY  7/1/2019            Family History   Problem Relation Age of Onset    Stroke Mother 80    Stomach Cancer Father 78    Breast Cancer Sister 80       Social History     Socioeconomic History    Marital status:      Spouse name: Not on file    Number of children: Not on file    Years of education: Not on file    Highest education level: Not on file   Occupational History    Not on file   Social Needs    Financial resource strain: Not on file    Food insecurity:     Worry: Not on file     Inability: Not on file    Transportation needs:     Medical: Not on file     Non-medical: Not on file   Tobacco Use    Smoking status: Never Smoker    Smokeless tobacco: Never Used   Substance and Sexual Activity    Alcohol use: Never     Frequency: Never    Drug use: Never    Sexual activity: Not Currently   Lifestyle    Physical activity:     Days per week: Not on file     Minutes per session: Not on file    Stress: Not on file   Relationships    Social connections:     Talks on phone: Not on file     Gets together: Not on file     Attends Hoahaoism service: Not on file     Active member of club or organization: Not on file     Attends meetings of clubs or organizations: Not on file     Relationship status: Not on file    Intimate partner violence:     Fear of current or ex partner: Not on file     Emotionally abused: Not on file     Physically abused: Not on file     Forced sexual activity: Not on file   Other Topics Concern    Not on file   Social History Narrative    Not on file            Visit Vitals  /68 (BP 1 Location: Left arm, BP Patient Position: Sitting)   Pulse 61   Temp 97.4 °F (36.3 °C) (Oral)   Resp 16   Ht 5' 2\" (1.575 m)   Wt 163 lb 9.6 oz (74.2 kg)   SpO2 (!) 61%   BMI 29.92 kg/m²       Physical Examination:   General - Well appearing female  HEENT - PERRL, TM no erythema/opacification, normal nasal turbinates, no oropharyngeal erythema or exudate, MMM  Neck - supple, no bruits, no thyroidomegaly, no lymphadenopathy  Pulm - clear to auscultation bilaterally  Cardio - RRR, normal S1 S2, no murmur  Abd - soft, nontender, no masses, no HSM  Extrem - no edema, +2 distal pulses  Neuro-  No focal deficits, CN intact     Assessment/Plan:    1.   htn--continue bystolic and janett/hctz.   Resume additional valsartan every other day, take on odd dates--start tomorrow. Continue to monitor at home  2. Forgetfulness--referral to neuropsychology, dr Yasir Gonzalez  3. Colon cancer--sp hemicolectomy  4. Incisional wall hernia--due to above.   Has opted to not see gen sx at this time, as it is completely reducible, and she does not want any additional surgeries    rtc 3 months        Fatou Obando III, DO

## 2020-01-06 NOTE — PROGRESS NOTES
Reviewed record in preparation for visit and have obtained necessary documentation. Identified pt with two pt identifiers(name and ). Chief Complaint   Patient presents with    Hypertension       Health Maintenance Due   Topic Date Due    GLAUCOMA SCREENING Q2Y  1997       Ms. Walker Hickman has a reminder for a \"due or due soon\" health maintenance. I have asked that she discuss health maintenance topic(s) due with Her  primary care provider. Coordination of Care Questionnaire:  :     1) Have you been to an emergency room, urgent care clinic since your last visit? no   Hospitalized since your last visit? no             2) Have you seen or consulted any other health care providers outside of [address] [place] [address] since your last visit? no  (Include any pap smears or colon screenings in this section.)    3) Do you have an Advance Directive on file? yes    4) Are you interested in receiving information on Advance Directives? NO    Patient is accompanied by self I have received verbal consent from Isis Segundo to discuss any/all medical information while they are present in the room.

## 2020-01-06 NOTE — LETTER
January 6, 2020 Ester Patel [address] [facility] [address] [id number] Dear Isis: Thank you for requesting access to Envivio. Please follow the instructions below to view your test results, access and download parts of your medical record, view details of your past and upcoming appointments, and view your medications online. How Do I Sign Up? 1. In your internet browser, go to [email or web address] 2. Click on the First Time User? Click Here link in the Sign In box. You will see the New Member Sign Up page. 3. Enter your Envivio Access Code exactly as it appears below. You will not need to use this code after youve completed the sign-up process. If you do not sign up before the expiration date, you must request a new code. · Envivio Access Code: [email or web address] · Expires: 2/20/2020  8:26 AM 
 
4. Enter the last four digits of your Social Security Number (xxxx) and Date of Birth (mm/dd/yyyy) as indicated and click Submit. You will be taken to the next sign-up page. 5. Create a Envivio ID. This will be your Envivio login ID and cannot be changed, so think of one that is secure and easy to remember. 6. Create a Envivio password. You can change your password at any time. 7. Enter your Password Reset Question and Answer. This can be used at a later time if you forget your password. 8. Enter your e-mail address. You will receive e-mail notification when new information is available in [address]. 9. Click Sign Up. You can now view and download portions of your medical record. 10. Click the Download Summary menu link to download a portable copy of your medical information. Additional Information: If you require any assistance or have any questions, please contact our [address] at [phone], email at [email or web address]. Remember, Envivio is NOT to be used for urgent needs.  For medical emergencies, dial 911. Now available from your iPhone and Android! Sincerely, Zahida Herrera

## 2020-01-14 ENCOUNTER — TELEPHONE (OUTPATIENT)
Dept: NEUROLOGY | Age: 85
End: 2020-01-14

## 2020-01-14 NOTE — TELEPHONE ENCOUNTER
----- Message from Roxie Jackson sent at 1/13/2020  4:07 PM EST -----  Regarding: Dr. Aidan Epstein  General Message/Vendor Calls    Caller's first and last name: Emmanuel Wilson/Daughter      Reason for call: requesting a call back in regards to scheduling a NP appointment and Testing      Callback required yes/no and why: yes      Best contact number(s): [phone]      Details to clarify the request:      Roxie Jackson

## 2020-02-13 ENCOUNTER — OFFICE VISIT (OUTPATIENT)
Dept: NEUROLOGY | Age: 85
End: 2020-02-13

## 2020-02-13 DIAGNOSIS — G31.84 MILD COGNITIVE IMPAIRMENT WITH MEMORY LOSS: Primary | ICD-10-CM

## 2020-02-13 NOTE — PROGRESS NOTES
This note will not be viewable in [address] [facility] at [place] [address] [address] [place] [address]    Office:  [phone]  Fax: [phone]                 Initial Office Exam    Patient Name: Isis Tena  Age: 80 y.o. Gender: female   Occupation: Retired  Handedness: right handed   Presenting Concern: Forgetfulness  Primary Care Physician: Ofelia Massey DO  Referring Provider:  Same as above      REASON FOR REFERRAL:  This comprehensive and medically necessary neuropsychological assessment was requested to assist with a differential diagnosis of possible dementia. The use and purpose of this examination, as well as the extent and limitations of confidentiality, were explained prior to obtaining permission to participate. Instructions were provided regarding the necessity to put forth optimal effort and answer questions truthfully in order to obtain reliable and accurate test results. PERTINENT HISTORY:  Ms. Korin Glover presented for a neuropsychological assessment at the recommendation of her treating physician secondary to complaints of memory problems, initiating or maintaining conversation, feeling slowed down, increased irritability, acting without thinking, more easily frustrated, more unwilling to compromise, and difficulty working computers. From a brief review of her medical and personal history there has not been any other significant neurological injury or illness noted or reported. She did not report experiencing depression or anxiety in the past.      Ms. Korin Glover does not  report any problems at birth or difficulties meeting developmental milestones. She reports that she had an adequate level of family support and was not subject to trauma or abuse as a child.   Ms. Korin Glover does not  report being retain in school or receiving special assistance in any of she classes or subjects. Ms. Rosalee Henson completed 12 years of education. Ms. Rosalee Henson does not  exercise on a regular basis and does  maintain a balanced diet. She does not  report problems with sleep and does not  complain of pain. She does  participate in mentally stimulating activities. Ms. Rosalee Henson does  have concerns regarding family member - related stressors. Ms. Rosalee Henson indicated that she is independent in her instrumental activities of daily living, including shopping, meal preparation, housekeeping, doing laundry, driving a car, managing medications, and finances. Current Outpatient Medications   Medication Sig    folic acid (FOLVITE) 1 mg tablet TAKE 1 TABLET BY MOUTH ONCE A DAY    biotin 10,000 mcg cap Take  by mouth daily.  valsartan-hydroCHLOROthiazide (DIOVAN-HCT) 160-12.5 mg per tablet Take 1 Tab by mouth daily.  nebivolol (BYSTOLIC) 5 mg tablet Take 1 Tab by mouth daily.  ferrous sulfate 325 mg (65 mg iron) tablet Take 1 Tab by mouth Daily (before breakfast). No current facility-administered medications for this visit. Past Medical History:   Diagnosis Date    Hypertension        No flowsheet data found.     No data recorded    Past Surgical History:   Procedure Laterality Date    COLONOSCOPY N/A 7/1/2019    COLONOSCOPY performed by Akilah Davis MD at [facility] ENDOSCOPY    COLONOSCOPY,DIAGNOSTIC  7/1/2019         COLONOSCPY,FLEX,W/ [address] INJECT  7/1/2019         HX ORTHOPAEDIC      tia. hip replacement    UPPER GI ENDOSCOPY,BIOPSY  7/1/2019            Social History     Socioeconomic History    Marital status:      Spouse name: Not on file    Number of children: Not on file    Years of education: Not on file    Highest education level: Not on file   Tobacco Use    Smoking status: Never Smoker    Smokeless tobacco: Never Used   Substance and Sexual Activity    Alcohol use: Never     Frequency: Never    Drug use: Never    Sexual activity: Not Currently       Family History   Problem Relation Age of Onset    Stroke Mother 80    Stomach Cancer Father 78    Breast Cancer Sister 80       CT Results (most recent):  Results from Hospital Encounter encounter on 07/25/19   CT PELV WO CONT    Narrative Noncontrast CT of the pelvis was performed in anticipation of an abscess  drainage procedure    CT dose reduction was achieved through use of a standardized protocol tailored  for this examination and are automatic exposure control for dose modulation dose  reduction. The collection of air and fluid now has almost no fluid and multiloculated  portions of air. The patient reports less abdominal pain and no fever. Impression IMPRESSION: Decreasing amounts of fluid in the collection with multiloculated  collections of air. Patient is clinically well. Drainage is deferred at this  time. Findings were discussed with Dr. Reece Fitzgerald     MRI Results (most recent):  No results found for this or any previous visit. MENTAL STATUS:    Orientation:  Fully oriented   Eye Contact:  Appropriate eye contact   Motor Behavior:   Ambulates independently   Speech:   Intelligent and fluid speech   Thought Process:  Goal-directed   Thought Content:  No evidence of hallucinations delusions   Suicidal ideations:  Denies   Mood:   Euthymic   Affect:   Unremarkable   Concentration:   Within normal limits   Abstraction:   Mildly decreased   Insight:   Adequate     On the Modified Mini-Mental Status Exam: 98/100      DIAGNOSTIC IMPRESSIONS:    ICD-10-CM ICD-9-CM    1. Mild cognitive impairment with memory loss G31.84 331.83      780.93              PLAN:  1. Complete a comprehensive neuropsychological assessment to provide a differential diagnosis of presenting concerns as well as to assist with disposition and treatment planning as appropriate. 2. Consider compensatory and remedial cognitive training. 61053 x 1 Review of records.  Face to face interview w/ patient. Determine test protocol: 60 minutes. Total 1 unit  51640 continuation of service      Carina Nolan, PhD, ABPP, LCP  Licensed Clinical Psychologist/ Neuropsychologist        This note will not be viewable in [address]

## 2020-02-14 ENCOUNTER — TELEPHONE (OUTPATIENT)
Dept: INTERNAL MEDICINE CLINIC | Age: 85
End: 2020-02-14

## 2020-02-14 DIAGNOSIS — H91.8X3 OTHER SPECIFIED HEARING LOSS OF BOTH EARS: Primary | ICD-10-CM

## 2020-02-14 NOTE — TELEPHONE ENCOUNTER
[facility] [address] Office Pool             Caller (first and last name if not the patient or from practice):ROQUE Montes   Caller's relationship to patient (if not from a practice): Daughter   Name of caller (if calling from a practice): N/A   Patient insurance Type: Medicare - [address]   Name of practice: [facility]   Specialist's title, first, and last name: TBD   Specialist Type: Audiology   Office Phone Number: [phone]   Fax number: [phone]   Date and time of appointment: 02/20/21 - 3:30 pm   Reason for appointment: Evaluation For Hearing aid   Details to clarify the request: Insurance Refferal     Copy/Paste  ENVERA

## 2020-03-05 ENCOUNTER — OFFICE VISIT (OUTPATIENT)
Dept: NEUROLOGY | Age: 85
End: 2020-03-05

## 2020-03-05 DIAGNOSIS — R41.81 AGE-RELATED COGNITIVE DECLINE: Primary | ICD-10-CM

## 2020-03-05 NOTE — LETTER
3/18/20 Patient: Teresa Tolentino YOB: 1932 Date of Visit: 3/5/2020 Professor Ilene Monreal DO 
[address] [facility] [address] [address] VIA In Basket Dear Professor Ilene Monreal DO, Thank you for referring Ms. Kemar Espinoza to [address] for evaluation. My notes for this consultation are attached. This note will not be viewable in [address] [facility] at [facility] [address] [facility] Medical Office Building [place], [address] [facility] Office:  [phone]  Fax: [phone] Neuropsychological Evaluation Report Patient Name: Teresa Tolentino Age: 80 y.o. Gender: female Occupation: Retired Handedness: right handed Presenting Concern: Forgetfulness Primary Care Physician: Abdelrahman Almanza DO Referring Provider:  Same as above PATIENT HISTORY (OBTAINED DURING INITIAL CLINICAL EVALUATION): 
 
REASON FOR REFERRAL: 
This comprehensive and medically necessary neuropsychological assessment was requested to assist with a differential diagnosis of possible dementia. The use and purpose of this examination, as well as the extent and limitations of confidentiality, were explained prior to obtaining permission to participate. Instructions were provided regarding the necessity to put forth optimal effort and answer questions truthfully in order to obtain reliable and accurate test results. 
  
PERTINENT HISTORY: 
Ms. Lucy Galaviz presented for a neuropsychological assessment at the recommendation of her treating physician secondary to complaints of memory problems, initiating or maintaining conversation, feeling slowed down, increased irritability, acting without thinking, more easily frustrated, more unwilling to compromise, and difficulty working computers.   From a brief review of her medical and personal history there has not been any other significant neurological injury or illness noted or reported. She did not report experiencing depression or anxiety in the past.   
  
Ms. Evan Zapata does not  report any problems at birth or difficulties meeting developmental milestones. She reports that she had an adequate level of family support and was not subject to trauma or abuse as a child. Ms. Evan Zapata does not  report being retain in school or receiving special assistance in any of she classes or subjects. Ms. Evan Zapata completed 12 years of education. 
  
Ms. Evan Zapata does not  exercise on a regular basis and does  maintain a balanced diet. She does not  report problems with sleep and does not  complain of pain. She does  participate in mentally stimulating activities. Ms. Evan Zapata does  have concerns regarding family member - related stressors. Ms. Evan Zapata indicated that she is independent in her instrumental activities of daily living, including shopping, meal preparation, housekeeping, doing laundry, driving a car, managing medications, and finances. METHODS OF ASSESSMENT (Current Evaluation): 
Clinician Administered: 
Clinical Interview Review of Medical Records Clock Drawing Task Modified Mini-Mental Status Exam (3MS) Test of Premorbid Functioning Hospital Anxiety and Depression Scale Revised Memory and Behavior Checklist 
 
Technician Administered: 
Leanne Dementia Rating Scale-2 NAB Judgment NAB Categories Test 
RBANS-A Reliable Digit Span Test of Memory Malingering Test of Practical Judgment (9-Item) [place] Functional Living Scale Trail Making Test 
 
 
TEST OBSERVATIONS: 
Ms. Evan Zapata arrived promptly for the testing session. Dress and grooming were appropriate; physical presentation was unchanged from that observed during the clinical interview. Speech was fluent, intelligible, and goal-directed. Affect was congruent with the euthymic mood conveyed.  Ms. Evan Zapata was adequately cooperative and appeared to put forth good effort throughout this examination. Rapport with the examiner was adequately established and maintained. Minimal prompting was required. Comprehension of test instructions was not problematic. Performance motivation was objectively measured by three instruments (RBANS EI, Reliable Digits, TOMM), and Ms. Elmer Womack produced a normal score on these measures. Accordingly, test findings below do not appear to be the product of disingenuous effort. Given the above observations, plus comments contained in the Mental Status section, the results of this examination are regarded as reasonably reliable and valid. TEST RESULTS: 
Quantitative test results are derived from comparisons to age and education corrected cohort normative data, where applicable. Percentiles are included in these instances. Qualitative test results are determined using clinical observations. General Orientation and Awareness:      
Orientation to person, year, month, day of month, day of week, state, town, and circumstance. Awareness of deficits Aware Cognitive performance validity testing  Valid Attention/Concentration:      Classification:     
Coding (81 percentile)           High Average Simple visuomotor tracking (90 percentile)               High Average Digits forward (38 percentile)                 Average Digits backward (62 percentile)                 Average Visuospatial and Constructional Praxis:    
Figure copy (42 percentile)                                      Average Line orientation (70 percentile)                                                 Average Language:        
Picture naming (45 percentile)                               Average Semantic fluency (88 percentile)                    High Average Memory and Learning:      
Immediate word list learning (96 percentile)               Superior Delayed word list recall (99 percentile)                  Superior Delayed word list recognition (55 percentile)               Average Immediate story memory (75 percentile)                  High Average Delayed story recall (70 percentile)                Average Delayed figure recall (81 percentile)                High Average Cognitive Tests of Executive Functioning:    
Ability to think flexibly, Trail Making B (99 percentile)               Very Superior Simple judgment in daily decision making (73 percentile)              Average Complex judgment in daily decision making (89 percentile)               High Average Categories (46 percentile)                  Average Leanne Dementia Rating Scale - 2: 
      Scale                           SS               Percentile Attention                             14                    [address] Initiation/Perseveration      13                     89                                          High Average Construction                       10                     59                                         Average Conceptualization              11                     71                                         Average Memory                              10                     59                                         Average Total                                   14                     91                                         High Average Adaptive Behavioral Measure of Daily Functioning: 
 Time:    75 %ile Money/calculations:   75 %ile Communication:             >75 %ile Memory:     50%ile Total:     T= 60 (84 %ile) Intellectual and Basic Cognitive Functioning (WAIS-IV): 
ACS Test of pre-morbid functioning reading recognition lower limit estimated WAIS-IV FSIQ score: 
     Estimated full scale IQ:            120    90 percentile     High Average Emotional Functioning: 
 HADS Depression Normal range HADS Anxiety Normal range IMPRESSIONS: 
 Ms. West Mckeon was seen for a comprehensive evaluation and was administered a battery of measures that assessed attention, visuospatial, language, memory, and executive functioning. Her overall level of performance was in the average range. Individual domain scores ranged from average to high average. There was no indication of impairment. There is no indication of emotional distress. DIAGNOSTIC IMPRESSIONS: 
  ICD-10-CM ICD-9-CM 1. Age-related cognitive decline R41.81 294.9 RI NEUROPSYCHOLOGICAL TST EVAL PHYS/QHP 1ST HOUR  
   RI NEUROPSYCHOLOGICAL TST EVAL PHYS/QHP EA ADDL HR  
   RI PSYL/NRPSYCL TST PHYS/QHP 2+ TST 1ST 30 MIN  
   RI PSYCL/NRPSYCL TST PHYS/QHP 2+ TST EA ADDL 30 MIN  
   RI PSYCL/NRPSYCL TST TECH 2+ TST 1ST 30 MIN  
   RI PSYCL/NRPSYCL TST TECH 2+ TST EA ADDL 30 MIN  
 
 
 
RECOMMENDATIONS:  
1. Findings should be reviewed with Ms. eWst Mckeon to insure her understanding and discuss the potential implications. 2. Emphasis should be placed on Ms. West Mckeon obtaining good sleep hygiene and maintaining adequate physical exercise to promote good brain health. 3. Ms. West Mckeon may benefit from a referral to psychotherapy to address anxiety and work on adequate stress management skills. 4. Ms. West Mckeon is encouraged to seek out various forms of mental stimulation that would help to \"exercise\" her brain. This might include learning a new skill, hobby, travel, attending lectures, or evening reading or listening to podcasts or videos on topics of her interest. 
 
 
Thank you for allowing me the opportunity to assist you in 's care. Please do not hesitate to contact me should you have additional questions that I may not have addressed.  
 
96136 x 1 
96138 x 1 
96139 x 6 
96132 x 1 
96133 x 2 
 
 
 
 
 Cy Roldan, Ph.D., ABPP Licensed Clinical Psychologist 
Neuropsychologist 
Board Certified Rehabilitation Psychologist 
 
 
Time Documentation: 
  
93810 x 1: Neurobehavioral Status Exam/Clinical Interview: (1 hour, (already billed on first date of service) 72786*6 Neuropsych testing/data gathering by Neuropsychologist (35 additional minutes, see above) 85054 x 1 
96139 x 6 Test Administration/Data Gathering By Technician: (3.5 hours). 28640 x 1 (first 30 minutes), 96139 x 7 (each additional 30 minutes) 54736 x 1 
96133 x 1 Testing Evaluation Services by Neuropsychologist (1 hour, 50 minutes) 96132 x 1 (first hour), 96133 x 1 (50 minutes) Definitions:   
  
99925/99044:  Neurobehavioral Status Exam, Clinical interview. Clinical assessment of thinking, reasoning and judgment, by neuropsychologist, both face to face time with patient and time interpreting those test results and reporting, first and subsequent hours) 06499/81442: Neuropsychological Test Administration by Technician/Psychometrist, first 30 minutes and each additional 30 minutes. The above includes: Record review. Review of history provided by patient. Review of collaborative information. Testing by Clinician. Review of raw data. Scoring. Report writing of individual tests administered by Clinician. Integration of individual tests administered by psychometrist with NSE/testing by clinician, review of records/history/collaborative information, case Conceptualization, treatment planning, clinical decision making, report writing, coordination Of Care.  Psychometry test codes as time spent by psychometrist administering and scoring neurocognitive/psychological tests under supervision of neuropsychologist.  Integral services including scoring of raw data, data interpretation, case conceptualization, report writing etcetera were initiated after the patient finished testing/raw data collected and was completed on the date the report was signed. This note will not be viewable in [address] If you have questions, please do not hesitate to call me. I look forward to following your patient along with you.  
 
 
Sincerely, 
 
Cesia Amos, PHD

## 2020-03-05 NOTE — PROGRESS NOTES
This note will not be viewable in [address] [facility] at [address]    Office:  [phone]  Fax: [phone]                                               Neuropsychological Evaluation Report    Patient Name: Isis Hayes  Age: 80 y.o. Gender: female   Occupation: Retired  Handedness: right handed   Presenting Concern: Forgetfulness  Primary Care Physician: Sarah Delarosa DO  Referring Provider:  Same as above    PATIENT HISTORY (OBTAINED DURING INITIAL CLINICAL EVALUATION):    REASON FOR REFERRAL:  This comprehensive and medically necessary neuropsychological assessment was requested to assist with a differential diagnosis of possible dementia. The use and purpose of this examination, as well as the extent and limitations of confidentiality, were explained prior to obtaining permission to participate. Instructions were provided regarding the necessity to put forth optimal effort and answer questions truthfully in order to obtain reliable and accurate test results.     PERTINENT HISTORY:  Ms. Rina Shaffer presented for a neuropsychological assessment at the recommendation of her treating physician secondary to complaints of memory problems, initiating or maintaining conversation, feeling slowed down, increased irritability, acting without thinking, more easily frustrated, more unwilling to compromise, and difficulty working computers. From a brief review of her medical and personal history there has not been any other significant neurological injury or illness noted or reported. She did not report experiencing depression or anxiety in the past.       Ms. Rina Shaffer does not  report any problems at birth or difficulties meeting developmental milestones. She reports that she had an adequate level of family support and was not subject to trauma or abuse as a child.   Ms. Rina Shaffer does not  report being retain in school or receiving special assistance in any of she classes or subjects. Ms. Radu Shin completed 12 years of education.     Ms. Radu Shin does not  exercise on a regular basis and does  maintain a balanced diet. She does not  report problems with sleep and does not  complain of pain. She does  participate in mentally stimulating activities. Ms. Radu Shin does  have concerns regarding family member - related stressors. Ms. Radu Shin indicated that she is independent in her instrumental activities of daily living, including shopping, meal preparation, housekeeping, doing laundry, driving a car, managing medications, and finances. METHODS OF ASSESSMENT (Current Evaluation):  Clinician Administered:  Clinical Interview  Review of Medical Records  Clock Drawing Task  Modified Mini-Mental Status Exam (3MS)  Test of Premorbid Functioning  Hospital Anxiety and Depression Scale  Revised Memory and Behavior Checklist    Technician Administered:  Leanne Dementia Rating Scale-2  NAB Judgment  NAB Categories Test  RBANS-A  Reliable Digit Span   Test of Memory Malingering  Test of Practical Judgment (9-Item)  [place] Functional Living Scale  Trail Making Test      TEST OBSERVATIONS:  Ms. Radu Shin arrived promptly for the testing session. Dress and grooming were appropriate; physical presentation was unchanged from that observed during the clinical interview. Speech was fluent, intelligible, and goal-directed. Affect was congruent with the euthymic mood conveyed. Ms. Radu Shin was adequately cooperative and appeared to put forth good effort throughout this examination. Rapport with the examiner was adequately established and maintained. Minimal prompting was required. Comprehension of test instructions was not problematic. Performance motivation was objectively measured by three instruments (RBANS EI, Reliable Digits, TOMM), and Ms. Radu Shin produced a normal score on these measures.  Accordingly, test findings below do not appear to be the product of disingenuous effort. Given the above observations, plus comments contained in the Mental Status section, the results of this examination are regarded as reasonably reliable and valid. TEST RESULTS:  Quantitative test results are derived from comparisons to age and education corrected cohort normative data, where applicable. Percentiles are included in these instances. Qualitative test results are determined using clinical observations. General Orientation and Awareness:       Orientation to person, year, month, day of month, day of week, state, town, and circumstance.    Awareness of deficits Aware                   Cognitive performance validity testing  Valid          Attention/Concentration:      Classification:      Coding (81 percentile)           High Average  Simple visuomotor tracking (90 percentile)               High Average  Digits forward (38 percentile)                 Average  Digits backward (62 percentile)                 Average    Visuospatial and Constructional Praxis:     Figure copy (42 percentile)                                      Average  Line orientation (70 percentile)                                                 Average       Language:         Picture naming (45 percentile)                               Average  Semantic fluency (88 percentile)                    High Average    Memory and Learning:       Immediate word list learning (96 percentile)               Superior  Delayed word list recall (99 percentile)                  Superior  Delayed word list recognition (55 percentile)               Average  Immediate story memory (75 percentile)                  High Average  Delayed story recall (70 percentile)                Average  Delayed figure recall (81 percentile)                High Average      Cognitive Tests of Executive Functioning:     Ability to think flexibly, Trail Making B (99 percentile)               Very Superior  Simple judgment in daily decision making (73 percentile)              Average  Complex judgment in daily decision making (89 percentile)               High Average  Categories (46 percentile)                  Average      Leanne Dementia Rating Scale - 2:        Scale                           SS               Percentile  Attention                             14                    94                                         Superior  Initiation/Perseveration      13                     89                                         High Average  Construction                      10                     59                                        Average  Conceptualization              11                     71                                        Average  Memory                              10                     59                                        Average  Total                                   14                     91                                        High Average      Adaptive Behavioral Measure of Daily Functioning:   Time:    75 %ile   Money/calculations:   75 %ile  Communication:             >75 %ile   Memory:     50%ile    Total:     T= 60 (84 %ile)      Intellectual and Basic Cognitive Functioning (WAIS-IV):  ACS Test of pre-morbid functioning reading recognition lower limit estimated WAIS-IV FSIQ score:       Estimated full scale IQ:            120    90 percentile     High Average    Emotional Functioning:   HADS Depression Normal range   HADS Anxiety Normal range    IMPRESSIONS:  Ms. Ian Stafford was seen for a comprehensive evaluation and was administered a battery of measures that assessed attention, visuospatial, language, memory, and executive functioning. Her overall level of performance was in the average range. Individual domain scores ranged from average to high average. There was no indication of impairment. There is no indication of emotional distress.     DIAGNOSTIC IMPRESSIONS:    ICD-10-CM ICD-9-CM 1. Age-related cognitive decline R41.81 294.9 MO NEUROPSYCHOLOGICAL TST EVAL PHYS/QHP 1ST HOUR      MO NEUROPSYCHOLOGICAL TST EVAL PHYS/QHP EA ADDL HR      MO PSYL/NRPSYCL TST PHYS/QHP 2+ TST 1ST 30 MIN      MO PSYCL/NRPSYCL TST PHYS/QHP 2+ TST EA ADDL 30 MIN      MO PSYCL/NRPSYCL TST TECH 2+ TST 1ST 30 MIN      MO PSYCL/NRPSYCL TST TECH 2+ TST EA ADDL 30 MIN      MO PSYCL/NRPSYCL TST TECH 2+ TST EA ADDL 30 MIN      MO PSYCL/NRPSYCL TST TECH 2+ TST EA ADDL 30 MIN      MO PSYCL/NRPSYCL TST TECH 2+ TST EA ADDL 30 MIN      MO PSYCL/NRPSYCL TST TECH 2+ TST EA ADDL 30 MIN         RECOMMENDATIONS:   1. Findings should be reviewed with Ms. Kuldip Osman to insure her understanding and discuss the potential implications. 2. Emphasis should be placed on Ms. Kuldip Osman obtaining good sleep hygiene and maintaining adequate physical exercise to promote good brain health. 3. Ms. Kuldip Osman may benefit from a referral to psychotherapy to address anxiety and work on adequate stress management skills. 4. Ms. Kuldip Osman is encouraged to seek out various forms of mental stimulation that would help to \"exercise\" her brain. This might include learning a new skill, hobby, travel, attending lectures, or evening reading or listening to podcasts or videos on topics of her interest.      Thank you for allowing me the opportunity to assist you in 's care. Please do not hesitate to contact me should you have additional questions that I may not have addressed.     26311 x 1  96138 x 1  96139 x 6  96132 x 1  96133 x 2          Dora Grewal, Ph.D., ABPP  Licensed Clinical Psychologist  Neuropsychologist  Board Certified Rehabilitation Psychologist      Time Documentation:     27559 x 1: Neurobehavioral Status Exam/Clinical Interview: (1 hour, (already billed on first date of service)     88298*3 Neuropsych testing/data gathering by Neuropsychologist (35 additional minutes, see above)      96138 x 1  96139 x 6 Test Administration/Data Gathering By Technician: (3.5 hours). 46079 x 1 (first 30 minutes), 54866 x 7 (each additional 30 minutes)     96132 x 1  96133 x 1 Testing Evaluation Services by Neuropsychologist (1 hour, 50 minutes) 96132 x 1 (first hour), 96133 x 1 (50 minutes)     Definitions:       83155/98921:  Neurobehavioral Status Exam, Clinical interview. Clinical assessment of thinking, reasoning and judgment, by neuropsychologist, both face to face time with patient and time interpreting those test results and reporting, first and subsequent hours)     22624/84837: Neuropsychological Test Administration by Technician/Psychometrist, first 30 minutes and each additional 30 minutes. The above includes: Record review. Review of history provided by patient. Review of collaborative information. Testing by Clinician. Review of raw data. Scoring. Report writing of individual tests administered by Clinician. Integration of individual tests administered by psychometrist with NSE/testing by clinician, review of records/history/collaborative information, case Conceptualization, treatment planning, clinical decision making, report writing, coordination Of Care. Psychometry test codes as time spent by psychometrist administering and scoring neurocognitive/psychological tests under supervision of neuropsychologist.  Integral services including scoring of raw data, data interpretation, case conceptualization, report writing etcetera were initiated after the patient finished testing/raw data collected and was completed on the date the report was signed. This note will not be viewable in [address]

## 2020-03-31 ENCOUNTER — TELEPHONE (OUTPATIENT)
Dept: INTERNAL MEDICINE CLINIC | Age: 85
End: 2020-03-31

## 2020-05-04 RX ORDER — GUAIFENESIN 100 MG/5ML
81 LIQUID (ML) ORAL DAILY
COMMUNITY

## 2020-05-04 RX ORDER — ASCORBIC ACID 500 MG
500 TABLET ORAL DAILY
COMMUNITY

## 2020-05-08 ENCOUNTER — ANESTHESIA (OUTPATIENT)
Dept: ENDOSCOPY | Age: 85
End: 2020-05-08
Payer: MEDICARE

## 2020-05-08 ENCOUNTER — HOSPITAL ENCOUNTER (OUTPATIENT)
Age: 85
Setting detail: OUTPATIENT SURGERY
Discharge: HOME | End: 2020-05-08
Attending: SPECIALIST | Admitting: SPECIALIST
Payer: MEDICARE

## 2020-05-08 ENCOUNTER — ANESTHESIA EVENT (OUTPATIENT)
Dept: ENDOSCOPY | Age: 85
End: 2020-05-08
Payer: MEDICARE

## 2020-05-08 VITALS
SYSTOLIC BLOOD PRESSURE: 168 MMHG | HEART RATE: 55 BPM | TEMPERATURE: 98.4 F | OXYGEN SATURATION: 99 % | HEIGHT: 63 IN | WEIGHT: 166 LBS | BODY MASS INDEX: 29.41 KG/M2 | DIASTOLIC BLOOD PRESSURE: 62 MMHG | RESPIRATION RATE: 23 BRPM

## 2020-05-08 PROBLEM — D13.2 ADENOMATOUS DUODENAL POLYP: Status: ACTIVE | Noted: 2020-05-08

## 2020-05-08 PROCEDURE — 76060000031 HC ANESTHESIA FIRST 0.5 HR: Performed by: SPECIALIST

## 2020-05-08 PROCEDURE — 74011250636 HC RX REV CODE- 250/636: Performed by: SPECIALIST

## 2020-05-08 PROCEDURE — 74011000250 HC RX REV CODE- 250: Performed by: NURSE ANESTHETIST, CERTIFIED REGISTERED

## 2020-05-08 PROCEDURE — 76040000019: Performed by: SPECIALIST

## 2020-05-08 PROCEDURE — 74011250636 HC RX REV CODE- 250/636: Performed by: NURSE ANESTHETIST, CERTIFIED REGISTERED

## 2020-05-08 RX ORDER — ATROPINE SULFATE 0.1 MG/ML
0.5 INJECTION INTRAVENOUS
Status: DISCONTINUED | OUTPATIENT
Start: 2020-05-08 | End: 2020-05-08 | Stop reason: HOSPADM

## 2020-05-08 RX ORDER — MIDAZOLAM HYDROCHLORIDE 1 MG/ML
.25-5 INJECTION, SOLUTION INTRAMUSCULAR; INTRAVENOUS
Status: DISCONTINUED | OUTPATIENT
Start: 2020-05-08 | End: 2020-05-08 | Stop reason: HOSPADM

## 2020-05-08 RX ORDER — SODIUM CHLORIDE 0.9 % (FLUSH) 0.9 %
5-40 SYRINGE (ML) INJECTION AS NEEDED
Status: DISCONTINUED | OUTPATIENT
Start: 2020-05-08 | End: 2020-05-08 | Stop reason: HOSPADM

## 2020-05-08 RX ORDER — EPINEPHRINE 0.1 MG/ML
1 INJECTION INTRACARDIAC; INTRAVENOUS
Status: DISCONTINUED | OUTPATIENT
Start: 2020-05-08 | End: 2020-05-08 | Stop reason: HOSPADM

## 2020-05-08 RX ORDER — SODIUM CHLORIDE 9 MG/ML
100 INJECTION, SOLUTION INTRAVENOUS CONTINUOUS
Status: DISCONTINUED | OUTPATIENT
Start: 2020-05-08 | End: 2020-05-08 | Stop reason: HOSPADM

## 2020-05-08 RX ORDER — NALOXONE HYDROCHLORIDE 0.4 MG/ML
0.4 INJECTION, SOLUTION INTRAMUSCULAR; INTRAVENOUS; SUBCUTANEOUS
Status: DISCONTINUED | OUTPATIENT
Start: 2020-05-08 | End: 2020-05-08 | Stop reason: HOSPADM

## 2020-05-08 RX ORDER — SODIUM CHLORIDE 0.9 % (FLUSH) 0.9 %
5-40 SYRINGE (ML) INJECTION EVERY 8 HOURS
Status: DISCONTINUED | OUTPATIENT
Start: 2020-05-08 | End: 2020-05-08 | Stop reason: HOSPADM

## 2020-05-08 RX ORDER — PROPOFOL 10 MG/ML
INJECTION, EMULSION INTRAVENOUS AS NEEDED
Status: DISCONTINUED | OUTPATIENT
Start: 2020-05-08 | End: 2020-05-08 | Stop reason: HOSPADM

## 2020-05-08 RX ORDER — LIDOCAINE HYDROCHLORIDE 20 MG/ML
INJECTION, SOLUTION EPIDURAL; INFILTRATION; INTRACAUDAL; PERINEURAL AS NEEDED
Status: DISCONTINUED | OUTPATIENT
Start: 2020-05-08 | End: 2020-05-08 | Stop reason: HOSPADM

## 2020-05-08 RX ORDER — FLUMAZENIL 0.1 MG/ML
0.2 INJECTION INTRAVENOUS
Status: DISCONTINUED | OUTPATIENT
Start: 2020-05-08 | End: 2020-05-08 | Stop reason: HOSPADM

## 2020-05-08 RX ORDER — DEXTROMETHORPHAN/PSEUDOEPHED 2.5-7.5/.8
1.2 DROPS ORAL
Status: DISCONTINUED | OUTPATIENT
Start: 2020-05-08 | End: 2020-05-08 | Stop reason: HOSPADM

## 2020-05-08 RX ORDER — PHENYLEPHRINE HCL IN 0.9% NACL 0.4MG/10ML
SYRINGE (ML) INTRAVENOUS AS NEEDED
Status: DISCONTINUED | OUTPATIENT
Start: 2020-05-08 | End: 2020-05-08 | Stop reason: HOSPADM

## 2020-05-08 RX ADMIN — LIDOCAINE HYDROCHLORIDE 60 MG: 20 INJECTION, SOLUTION EPIDURAL; INFILTRATION; INTRACAUDAL; PERINEURAL at 10:10

## 2020-05-08 RX ADMIN — PROPOFOL 50 MG: 10 INJECTION, EMULSION INTRAVENOUS at 10:10

## 2020-05-08 RX ADMIN — PROPOFOL 20 MG: 10 INJECTION, EMULSION INTRAVENOUS at 10:23

## 2020-05-08 RX ADMIN — PROPOFOL 50 MG: 10 INJECTION, EMULSION INTRAVENOUS at 10:13

## 2020-05-08 RX ADMIN — Medication 400 MCG: at 10:26

## 2020-05-08 RX ADMIN — PROPOFOL 20 MG: 10 INJECTION, EMULSION INTRAVENOUS at 10:20

## 2020-05-08 RX ADMIN — SODIUM CHLORIDE 100 ML/HR: 900 INJECTION, SOLUTION INTRAVENOUS at 09:04

## 2020-05-08 RX ADMIN — PROPOFOL 20 MG: 10 INJECTION, EMULSION INTRAVENOUS at 10:17

## 2020-05-08 NOTE — H&P
Pre-endoscopy H and P    The patient was seen and examined in the endoscopy suite. The airway was assessed and docuemented. The problem list, past medical history, and medications were reviewed. She was scheduled for egd and colonoscopy. She needs an egd/ eus that I don't do the eus part. We agreed (and she agreed) to come back for the egd/ eus with Dr Marleni Gann at a later date.       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     Social History     Socioeconomic History    Marital status:      Spouse name: Not on file    Number of children: Not on file    Years of education: Not on file    Highest education level: Not on file   Occupational History    Not on file   Social Needs    Financial resource strain: Not on file    Food insecurity     Worry: Not on file     Inability: Not on file    Transportation needs     Medical: Not on file     Non-medical: Not on file   Tobacco Use    Smoking status: Never Smoker    Smokeless tobacco: Never Used   Substance and Sexual Activity    Alcohol use: Never     Frequency: Never    Drug use: Never    Sexual activity: Not Currently   Lifestyle    Physical activity     Days per week: Not on file     Minutes per session: Not on file    Stress: Not on file   Relationships    Social connections     Talks on phone: Not on file     Gets together: Not on file     Attends [employer] service: Not on file     Active member of club or organization: Not on file     Attends meetings of clubs or organizations: Not on file     Relationship status: Not on file    Intimate partner violence     Fear of current or ex partner: Not on file     Emotionally abused: Not on file     Physically abused: Not on file     Forced sexual activity: Not on file   Other Topics Concern    Not on file   Social History Narrative    Not on file     Past Medical History:   Diagnosis Date    Hypertension      The patient has a family history of na    Prior to Admission Medications   Prescriptions Last Dose Informant Patient Reported? Taking?   ascorbic acid, vitamin C, (Vitamin C) 500 mg tablet   Yes Yes   Sig: Take 500 mg by mouth daily. aspirin 81 mg chewable tablet 5/6/2020  Yes Yes   Sig: Take 81 mg by mouth daily. biotin 10,000 mcg cap 5/4/2020 at Unknown time  Yes Yes   Sig: Take  by mouth daily. ferrous sulfate 325 mg (65 mg iron) tablet 5/2/2020  No Yes   Sig: Take 1 Tab by mouth Daily (before breakfast). folic acid (FOLVITE) 1 mg tablet 5/2/2020  No No   Sig: TAKE 1 TABLET BY MOUTH ONCE A DAY   nebivolol (BYSTOLIC) 5 mg tablet 7/6/4887 at Unknown time  No Yes   Sig: Take 1 Tab by mouth daily. omega 3-dha-epa-fish oil 100-160-1,000 mg cap 5/2/2020  Yes Yes   Sig: Take  by mouth.   potassium 99 mg tablet 5/6/2020  Yes Yes   Sig: Take 99 mg by mouth daily. valsartan-hydroCHLOROthiazide (DIOVAN-HCT) 160-12.5 mg per tablet 5/7/2020 at Unknown time  No Yes   Sig: Take 1 Tab by mouth daily. Facility-Administered Medications: None           The review of systems is:  negative for shortness of breath or chest pain      The heart, lungs, and mental status were satisfactory for the administration of anesthesia sedation and for the procedure. I discussed with the patient the objectives, risks, consequences and alternatives to the procedure. The patient was counseled at length about the risks of bassam Covid-19 in the stephen-operative and post-operative states including the recovery window of their procedure. The patient was made aware that bassam Covid-19 after a surgical procedure may worsen their prognosis for recovering from the virus and lend to a higher morbidity and or mortality risk. The patient was given the options of postponing their procedure. All of the risks, benefits, and alternatives were discussed.  The patient has no significant previous medical problems and does wish to proceed with the procedure     Cathy Snider MD  5/8/2020  10:06 AM

## 2020-05-08 NOTE — DISCHARGE INSTRUCTIONS
Isis Fay  [id number]  6/6/1932              Procedure  Discharge Instructions:      Discomfort:  Redness at IV site- apply warm compress to area; if redness or soreness persist- contact your physician  There may be a slight amount of blood passed from the rectum  Gaseous discomfort- walking, belching will help relieve any discomfort  You may not operate a vehicle for 12 hours  You may not engage in an occupation involving machinery or appliances for rest of today  You may not drink alcoholic beverages for at least 12 hours  Avoid making any critical decisions for at least 24 hour  DIET:   You may resume your normal diet today. You should not overeat or \"feast\" today as your abdomen may become distended or uncomfortable. MEDICATIONS:   I reconciled this list from the list you gave us when you came today for the procedure. Please clarify with me, your primary care physician and the nurse who is discharging you if we have any discrepancies. Aspirin and or non-steroidal medication (Ibuprofen, Motrin, naproxen, etc.) is ok in limited quantities. ACTIVITY:  You may resume your normal daily activities it is recommended that you spend the remainder of the day resting -  avoid any strenuous activity. CALL M.D. ANY SIGN OF:  Increasing pain, nausea, vomiting  Abdominal distension (swelling)  New increased bleeding (oral or rectal)  Fever (chills)  Pain in chest area  Bloody discharge from nose or mouth  Shortness of breath          Follow-up Instructions:   No biopsies. Recall 3 years    Telephone #  [phone]  Follow up visit as needed. I will schedule your upper/ endoscopic ultrasound (come in fasting) with Dr. Drew Black.   I spoke to Merrill Voss MD  10:31 AM  5/8/2020

## 2020-05-08 NOTE — ANESTHESIA PREPROCEDURE EVALUATION
Relevant Problems   No relevant active problems       Anesthetic History     PONV (No PONV with EGD using propofol)          Review of Systems / Medical History  Patient summary reviewed, nursing notes reviewed and pertinent labs reviewed    Pulmonary  Within defined limits                 Neuro/Psych   Within defined limits           Cardiovascular    Hypertension: well controlled              Exercise tolerance: <4 METS  Comments: 2019 EKG:  SB, rate 58, low voltage, nonspec ST   GI/Hepatic/Renal               Comments: Personal hx of colon cancer with rt colectomy, polyps, Fe defic anemia Endo/Other        Arthritis and anemia     Other Findings              Physical Exam    Airway  Mallampati: II  TM Distance: 4 - 6 cm  Neck ROM: normal range of motion   Mouth opening: Normal     Cardiovascular    Rhythm: regular  Rate: normal         Dental    Dentition: Caps/crowns  Comments: Including the front top   Pulmonary  Breath sounds clear to auscultation               Abdominal  GI exam deferred       Other Findings            Anesthetic Plan    ASA: 3  Anesthesia type: total IV anesthesia          Induction: Intravenous  Anesthetic plan and risks discussed with: Patient      Last beta blker yesterday

## 2020-05-08 NOTE — ROUTINE PROCESS
Isis Navarro  6/6/1932  [id number]    Situation:  Verbal report received from: [facility] RN  Procedure: Procedure(s):  COLONOSCOPY(ESSENTIAL)    Background:    Preoperative diagnosis: PERSONAL HX OF COLON CANCER, POLYP OF DUODENUM, IRON DEFICENCY ANEMIA  Postoperative diagnosis: postop exam, internal, external hemorroids    :  Dr. Yasemin Adhikari  Assistant(s): Endoscopy Technician-1: Jonathan Yusuf  Endoscopy RN-1: Arturo Maynard RN    Specimens: * No specimens in log *  H. Pylori  no    Assessment:  Intra-procedure medications     Anesthesia gave intra-procedure sedation and medications, see anesthesia flow sheet yes    Intravenous fluids: NS@ KVO     Vital signs stable       Abdominal assessment: round and soft       Recommendation:  Discharge patient per MD order  .     Family or Friend Lisa Daughter  Permission to share finding with family or friend yes

## 2020-05-08 NOTE — ANESTHESIA POSTPROCEDURE EVALUATION
Procedure(s):  COLONOSCOPY(ESSENTIAL). total IV anesthesia    Anesthesia Post Evaluation        Patient location during evaluation: PACU  Note status: Adequate. Level of consciousness: responsive to verbal stimuli and sleepy but conscious  Pain management: satisfactory to patient  Airway patency: patent  Anesthetic complications: no  Cardiovascular status: acceptable  Respiratory status: acceptable  Hydration status: acceptable  Comments: +Post-Anesthesia Evaluation and Assessment    Patient: Isis Villaseñor MRN: [id number]  SSN: [id number]   YOB: 1932  Age: 80 y.o. Sex: female      Cardiovascular Function/Vital Signs    /62   Pulse (!) 55   Temp 36.9 °C (98.4 °F)   Resp 23   Ht 5' 3\" (1.6 m)   Wt 75.3 kg (166 lb)   SpO2 99%   Breastfeeding No   BMI 29.41 kg/m²     Patient is status post Procedure(s):  COLONOSCOPY(ESSENTIAL). Nausea/Vomiting: Controlled. Postoperative hydration reviewed and adequate. Pain:  Pain Scale 1: Numeric (0 - 10) (05/08/20 1055)  Pain Intensity 1: 0 (05/08/20 1055)   Managed. Neurological Status: At baseline. Mental Status and Level of Consciousness: Arousable. Pulmonary Status:   O2 Device: Room air (05/08/20 1058)   Adequate oxygenation and airway patent. Complications related to anesthesia: None    Post-anesthesia assessment completed. No concerns.     Signed By: Mike Iglesias DO    5/8/2020  Post anesthesia nausea and vomiting:  controlled      Vitals Value Taken Time   /62 5/8/2020 10:58 AM   Temp     Pulse 55 5/8/2020 10:58 AM   Resp 23 5/8/2020 10:58 AM   SpO2 99 % 5/8/2020 10:58 AM

## 2020-05-08 NOTE — PROCEDURES
Colonoscopy    Indications: history colon cancer    Pre-operative Diagnosis: see above    Assistant(s):  see chart   Wang Bran RN  [facility]    Medications:  See anesthesia form    Post-operative Diagnosis:  postop exam, internal, external hemorroids            Procedure Details   Prior to the procedure its objectives, risks, consequences and alternatives were discussed with the patient who then elected to proceed. All questions were answered. Digital Rectal Exam:  Showed externa lhemorrhoids    The Olympus videocolonoscope was inserted in the rectum and advanced to the anastomosis with the small intestine. It was identified by typical landmarks. The colonoscope was slowly and carefully withdrawn as the mucosa was inspected. No other abnormalities were noted. Retroflexion in the rectum was remarkable for internal hemorrhoids. Photos to document the anastomosis and retroflexion exam were obtained. The preparation was adequate      Estimated Blood Loss:  none    Specimens:  none    Findings:  Negative post op exam  Internal and external hemorrhoids    Complications:  none    Implants:  none    Repeat colonoscopy is recommended in: Three years.                Mitzi Garcia MD  10:29 AM  5/8/2020

## 2020-05-08 NOTE — PROGRESS NOTES
.. Endoscope was pre-cleaned at bedside immediately following procedure by DANNIELLE Swain .Anesthesia reports 160mg Propofol, 60mg Lidocaine and 400mL NS given during procedure. Received report from anesthesia staff on vital signs and status of patient.

## 2020-05-22 ENCOUNTER — TELEPHONE (OUTPATIENT)
Dept: INTERNAL MEDICINE CLINIC | Age: 85
End: 2020-05-22

## 2020-05-22 ENCOUNTER — OFFICE VISIT (OUTPATIENT)
Dept: PRIMARY CARE CLINIC | Age: 85
End: 2020-05-22

## 2020-05-22 DIAGNOSIS — R53.83 FATIGUE, UNSPECIFIED TYPE: Primary | ICD-10-CM

## 2020-05-22 DIAGNOSIS — M54.50 ACUTE BILATERAL LOW BACK PAIN, UNSPECIFIED WHETHER SCIATICA PRESENT: ICD-10-CM

## 2020-05-22 DIAGNOSIS — R50.9 FEVER, UNSPECIFIED FEVER CAUSE: ICD-10-CM

## 2020-05-22 NOTE — PROGRESS NOTES
Patient was seen at [facility] flu clinic. Please seen scanned form for additional visit documentation. HISTORY OF PRESENT ILLNESS  Isis Bradshaw is a 80 y.o. female. HPI  Presents accompanied by her daughter with complaints of chills, low grade fever to 99.6, lower back aching that began last night. Has been feeling more fatigued as well. Has been noting some increase in post nasal drainage but denies cough, shortness of breath, nausea, vomiting, diarrhea. Denies urinary frequency, urinary urgency, dysuria. Denies hematuria. She lives with her daughter and son-in-law and he works in a hospital setting although he has not had any known exposure to COVID-19. Review of Systems   Constitutional: Positive for chills, fever and malaise/fatigue. HENT: Negative for congestion, sinus pain and sore throat. Respiratory: Negative for cough and shortness of breath. Gastrointestinal: Negative for abdominal pain, nausea and vomiting. Genitourinary: Negative for dysuria, frequency, hematuria and urgency. Musculoskeletal: Positive for back pain. Neurological: Negative for dizziness, tingling and headaches. Physical Exam  Vitals signs reviewed. Constitutional:       General: She is not in acute distress. Appearance: Normal appearance. HENT:      Nose: Nose normal.      Mouth/Throat:      Mouth: Mucous membranes are moist.      Pharynx: Oropharynx is clear. Cardiovascular:      Rate and Rhythm: Normal rate and regular rhythm. Pulmonary:      Effort: Pulmonary effort is normal.      Breath sounds: Normal breath sounds. No wheezing or rhonchi. Musculoskeletal:      Lumbar back: She exhibits tenderness. Back:       Comments: Mild tenderness to lower lumbar muscles    Neurological:      Mental Status: She is alert. ASSESSMENT and PLAN  Diagnoses and all orders for this visit:    1.  Fatigue, unspecified type  -     NOVEL CORONAVIRUS (COVID-19); Future    2. Fever, unspecified fever cause  -     NOVEL CORONAVIRUS (COVID-19); Future    3. Acute bilateral low back pain, unspecified whether sciatica present -- denies any symptoms suggestive of urinary tract issues but advised to follow up with PCP if symptoms worsen or fail to improve.  -     NOVEL CORONAVIRUS (COVID-19);  Future      lab results and schedule of future lab studies reviewed with patient

## 2020-05-24 LAB — SARS-COV-2, NAA: NOT DETECTED

## 2020-05-25 ENCOUNTER — TELEPHONE (OUTPATIENT)
Dept: PRIMARY CARE CLINIC | Age: 85
End: 2020-05-25

## 2020-06-01 ENCOUNTER — OFFICE VISIT (OUTPATIENT)
Dept: NEUROLOGY | Age: 85
End: 2020-06-01

## 2020-06-01 DIAGNOSIS — R41.81 AGE-RELATED COGNITIVE DECLINE: Primary | ICD-10-CM

## 2020-06-01 NOTE — PROGRESS NOTES
This note will not be viewable in [address] Vera Marin is a 80 y.o. female who presents today for feedback following recent neuropsychological testing. The results were reviewed of the recent neuropsychological evaluation, including discussing individual tests as well as the patient's areas of neurocognitive strength versus their weaknesses. Education was provided regarding my diagnostic impressions, and we discussed next steps for further evaluation down the road. I all also answered numerous questions related to the clinical findings, including discussing various methods to improve cognitive cognition and mood when relevant. The patient is encouraged to follow-up with the referring provider.     DIAGNOSTIC IMPRESSIONS:    ICD-10-CM ICD-9-CM    1. Age-related cognitive decline R41.81 294.9        83682 30 minutes x 1    Radha Malcolm, PHD

## 2020-06-28 RX ORDER — FOLIC ACID 1 MG/1
TABLET ORAL
Qty: 90 TAB | Refills: 2 | Status: SHIPPED | OUTPATIENT
Start: 2020-06-28 | End: 2021-04-09 | Stop reason: SDUPTHER

## 2020-09-29 RX ORDER — NEBIVOLOL 5 MG/1
5 TABLET ORAL DAILY
Qty: 90 TAB | Refills: 3 | Status: SHIPPED | OUTPATIENT
Start: 2020-09-29 | End: 2021-07-13

## 2020-09-29 RX ORDER — VALSARTAN AND HYDROCHLOROTHIAZIDE 160; 12.5 MG/1; MG/1
1 TABLET, FILM COATED ORAL DAILY
Qty: 90 TAB | Refills: 3 | Status: SHIPPED | OUTPATIENT
Start: 2020-09-29 | End: 2021-07-13

## 2020-09-29 NOTE — TELEPHONE ENCOUNTER
Jonatan Rose. [place] [address] Office Pool               Caller's first and last name: Pt   Reason for call: Requesting a new prescription for 3 of her medications sent to [address] [place] [address], [phone]. Callback required yes/no and why: yes   Best contact number(s): [phone]   Details to clarify the request: Stated that she needs a new prescription for Valsartan-Hydrochlorothiazide 160-12.5mg, Nebivolol (Bystolic) 5mg, and Valsartan 160mg. Stated that she only has a week left of the Bystolic medication.

## 2020-10-06 RX ORDER — VALSARTAN 160 MG/1
TABLET ORAL
Qty: 45 TAB | Refills: 3 | Status: SHIPPED | OUTPATIENT
Start: 2020-10-06

## 2020-12-07 ENCOUNTER — HOSPITAL ENCOUNTER (OUTPATIENT)
Dept: PREADMISSION TESTING | Age: 85
Discharge: HOME | End: 2020-12-07

## 2021-03-05 ENCOUNTER — TRANSCRIBE ORDER (OUTPATIENT)
Dept: SCHEDULING | Age: 86
End: 2021-03-05

## 2021-03-05 DIAGNOSIS — D50.9 IRON DEFICIENCY ANEMIA, UNSPECIFIED: Primary | ICD-10-CM

## 2021-03-05 DIAGNOSIS — C18.0 MALIGNANT NEOPLASM OF CECUM (HCC): ICD-10-CM

## 2021-04-09 RX ORDER — FOLIC ACID 1 MG/1
TABLET ORAL
Qty: 90 TAB | Refills: 2 | Status: SHIPPED | OUTPATIENT
Start: 2021-04-09

## 2021-04-26 ENCOUNTER — TELEPHONE (OUTPATIENT)
Dept: INTERNAL MEDICINE CLINIC | Age: 86
End: 2021-04-26

## 2021-04-26 DIAGNOSIS — H91.8X3 OTHER SPECIFIED HEARING LOSS OF BOTH EARS: Primary | ICD-10-CM

## 2021-04-26 NOTE — TELEPHONE ENCOUNTER
Bard Wu with [facility] states she needs an order sent over for routine hearing eval.    Fax #[phone]  ATTN: Bard Wu

## 2021-04-27 NOTE — TELEPHONE ENCOUNTER
Order faxed over to [facility] from [facility] [employer]. Fax confirmation received. No further actions required at this time.

## 2021-07-13 ENCOUNTER — TELEPHONE (OUTPATIENT)
Dept: INTERNAL MEDICINE CLINIC | Age: 86
End: 2021-07-13

## 2021-07-13 RX ORDER — VALSARTAN AND HYDROCHLOROTHIAZIDE 160; 12.5 MG/1; MG/1
TABLET, FILM COATED ORAL
Qty: 90 TABLET | Refills: 3 | Status: SHIPPED | OUTPATIENT
Start: 2021-07-13

## 2021-07-13 RX ORDER — NEBIVOLOL HYDROCHLORIDE 5 MG/1
TABLET ORAL
Qty: 90 TABLET | Refills: 3 | Status: SHIPPED | OUTPATIENT
Start: 2021-07-13

## 2021-07-13 NOTE — TELEPHONE ENCOUNTER
----- Message from Rocio Russell sent at 7/13/2021 12:00 PM EDT -----  Regarding: /telephone  Contact: [phone]  General Message/Vendor Calls    Caller's first and last name: N/A      Reason for call:  The pharmacy will be faxing over a rx refill request but [place] out [place] and you can disregard the request.       Callback required yes/no and why: No      Best contact number(s):[phone]      Details to clarify the request: N/A      Rocio Russell

## 2021-09-08 ENCOUNTER — TELEPHONE (OUTPATIENT)
Dept: INTERNAL MEDICINE CLINIC | Age: 86
End: 2021-09-08

## 2021-09-08 NOTE — TELEPHONE ENCOUNTER
States moved and needs records sent to new provider    States the provider sent a records request last week via fax and has yet to receive anything.     States should be from and sent to:  [facility]  Phone [phone]  Fax [phone] i

## 2021-09-14 ENCOUNTER — TELEPHONE (OUTPATIENT)
Dept: INTERNAL MEDICINE CLINIC | Age: 86
End: 2021-09-14

## 2021-09-14 NOTE — TELEPHONE ENCOUNTER
Received patient's release of records on 8/26/21.  Faxed records request to [facility] on 9/14/21 and received fax confirmation on 9/14/21 at 10:26 AM

## 2022-03-18 PROBLEM — D13.2 ADENOMATOUS DUODENAL POLYP: Status: ACTIVE | Noted: 2020-05-08

## 2022-03-18 PROBLEM — Z90.49 S/P RIGHT COLECTOMY: Status: ACTIVE | Noted: 2019-07-04

## 2022-03-18 PROBLEM — C18.9 COLON CANCER (HCC): Status: ACTIVE | Noted: 2019-07-09

## 2022-03-18 PROBLEM — D64.9 ANEMIA: Status: ACTIVE | Noted: 2019-06-28

## 2022-03-19 PROBLEM — I10 HTN (HYPERTENSION): Status: ACTIVE | Noted: 2019-07-09

## 2023-05-18 RX ORDER — FOLIC ACID 1 MG/1
1 TABLET ORAL DAILY
COMMUNITY
Start: 2021-04-09

## 2023-05-18 RX ORDER — FERROUS SULFATE 325(65) MG
325 TABLET ORAL
COMMUNITY
Start: 2019-07-09

## 2023-05-18 RX ORDER — ASCORBIC ACID 500 MG
500 TABLET ORAL DAILY
COMMUNITY

## 2023-05-18 RX ORDER — NEBIVOLOL 5 MG/1
1 TABLET ORAL DAILY
COMMUNITY
Start: 2021-07-13

## 2023-05-18 RX ORDER — ASPIRIN 81 MG/1
81 TABLET, CHEWABLE ORAL DAILY
COMMUNITY

## 2023-05-18 RX ORDER — BIOTIN 10000 MCG
CAPSULE ORAL DAILY
COMMUNITY

## 2023-05-18 RX ORDER — VALSARTAN 160 MG/1
TABLET ORAL
COMMUNITY
Start: 2020-10-06

## 2023-05-18 RX ORDER — VALSARTAN AND HYDROCHLOROTHIAZIDE 160; 12.5 MG/1; MG/1
1 TABLET, FILM COATED ORAL DAILY
COMMUNITY
Start: 2021-07-13

## 2023-11-17 NOTE — PROGRESS NOTES
.General Surgery End of Shift Nursing Note    Bedside shift change report given to PETROS Shankar (oncoming nurse) by Everardo Cantrell RN (offgoing nurse). Report included the following information SBAR, OR Summary, Procedure Summary, Intake/Output, MAR and Recent Results. Shift worked:   [id number]   Summary of shift:   Continue to encourage pt intake and movement. 1 x assist with walker. 0-2L NC - pt hold breath at times with pain. Doesn't tolerate pain meds well. Encourage Tramadol vs no movement. Issues for physician to address:  none     Number times ambulated in hallway past shift: 0  Number of times OOB to chair past shift: 3    Pain Management:  Current medication: Sched Tylenol and Tramadol  Patient states pain is manageable on current pain medication:yes    GI:    Current diet:  DIET REGULAR Low Fiber  DIET NUTRITIONAL SUPPLEMENTS Breakfast, Lunch; Ensure Enlive    Tolerating current diet:-poor intake  Passing flatus:yes  Last Bowel Movement:06/26/2019  Respiratory:    Incentive Spirometer at bedside: yes  Patient instructed on use: yes    Patient Safety:    Falls Score: 3  Bed Alarm On?no  Sitter?  no    Shane Byers RN Thank you for coming in today.  If you have any questions you may contact the office Monday through Friday at [phone] or on week ends at [phone].     Continue current medications.     Please follow  a 2 GM sodium diet and limit fluid intake to 2 liters per day or 8 servings ( serving size = 8 oz. = 1 cup = 240 ml) per day.   Please avoid processed meat products (luncheon meats, sausages, mejia, hot dogs for example) eat 4 servings of vegetables and 1-2 whole servings of whole fruits per day.   Please weigh daily and call [phone] for weight gain of 3 pounds in 24 hours or 5 pounds or if you experience increased swelling or shortness of breath.     Follow up in 3-4 months.

## (undated) DEVICE — NEONATAL-ADULT SPO2 SENSOR: Brand: NELLCOR

## (undated) DEVICE — KENDALL RADIOLUCENT FOAM MONITORING ELECTRODE RECTANGULAR SHAPE: Brand: KENDALL

## (undated) DEVICE — VISUALIZATION SYSTEM: Brand: CLEARIFY

## (undated) DEVICE — ACCESS PLATFORM FOR MINIMALLY INVASIVE SURGERY.: Brand: GELPORT® LAPAROSCOPIC  SYSTEM

## (undated) DEVICE — Device

## (undated) DEVICE — SET ADMIN 16ML TBNG L100IN 2 Y INJ SITE IV PIGGY BK DISP

## (undated) DEVICE — 1200 GUARD II KIT W/5MM TUBE W/O VAC TUBE: Brand: GUARDIAN

## (undated) DEVICE — (D)PREP SKN CHLRAPRP APPL 26ML -- CONVERT TO ITEM 371833

## (undated) DEVICE — SUTURE VCRL SZ 3-0 L27IN ABSRB VLT SH L26MM 1/2 CIR J784G

## (undated) DEVICE — BAG SPEC BIOHZRD 10 X 10 IN --

## (undated) DEVICE — CATH IV AUTOGRD BC PNK 20GA 25 -- INSYTE

## (undated) DEVICE — MARYLAND JAW LAPAROSCOPIC SEALER/DIVIDER COATED: Brand: LIGASURE

## (undated) DEVICE — SPONGE LAP 18X18IN STRL -- 5/PK

## (undated) DEVICE — BASIN EMSIS 16OZ GRAPHITE PLAS KID SHP MOLD GRAD FOR ORAL

## (undated) DEVICE — LEGGINGS: Brand: CONVERTORS

## (undated) DEVICE — HANDLE LT SNAP ON ULT DURABLE LENS FOR TRUMPF ALC DISPOSABLE

## (undated) DEVICE — SYR 10ML LUER LOK 1/5ML GRAD --

## (undated) DEVICE — PACK,BASIC,SIRUS,V: Brand: MEDLINE

## (undated) DEVICE — CONTAINER SPEC 20 ML LID NEUT BUFF FORMALIN 10 % POLYPR STS

## (undated) DEVICE — STAPLER INT L60MM REG TISS BLU B FRM 8 FIRING 2 ROW AUTO

## (undated) DEVICE — NEEDLE HYPO 22GA L1.5IN BLK S STL HUB POLYPR SHLD REG BVL

## (undated) DEVICE — Z DISCONTINUED PER MEDLINE LINE GAS SAMPLING O2/CO2 LNG AD 13 FT NSL W/ TBNG FILTERLINE

## (undated) DEVICE — STAIN INDIA INK IN NACL 10ML --

## (undated) DEVICE — SURGICAL PROCEDURE PACK BASIN MAJ SET CUST NO CAUT

## (undated) DEVICE — REM POLYHESIVE ADULT PATIENT RETURN ELECTRODE: Brand: VALLEYLAB

## (undated) DEVICE — SUTURE VCRL SZ 2-0 L27IN ABSRB UD L26MM SH 1/2 CIR J417H

## (undated) DEVICE — SYR 3ML LL TIP 1/10ML GRAD --

## (undated) DEVICE — TROCAR: Brand: KII FIOS FIRST ENTRY

## (undated) DEVICE — SUTURE MCRYL SZ 4-0 L27IN ABSRB UD L19MM PS-2 1/2 CIR PRIM Y426H

## (undated) DEVICE — 40580 - THE PINK PAD - ADVANCED TRENDELENBURG POSITIONING KIT: Brand: 40580 - THE PINK PAD - ADVANCED TRENDELENBURG POSITIONING KIT

## (undated) DEVICE — ELECTRODE,RADIOTRANSLUCENT,FOAM,5PK: Brand: MEDLINE

## (undated) DEVICE — NEEDLE HYPO 18GA L1.5IN PNK S STL HUB POLYPR SHLD REG BVL

## (undated) DEVICE — TROCAR: Brand: KII SLEEVE

## (undated) DEVICE — SUTURE SZ 0 27IN 5/8 CIR UR-6  TAPER PT VIOLET ABSRB VICRYL J603H

## (undated) DEVICE — KENDALL SCD EXPRESS SLEEVES, KNEE LENGTH, MEDIUM: Brand: KENDALL SCD

## (undated) DEVICE — DRAPE,ROBOTICS,STERILE: Brand: MEDLINE

## (undated) DEVICE — STAPLER INT L75MM CUT LN L73MM STPL LN L77MM BLU B FRM 8

## (undated) DEVICE — BLOCK BITE ENDOSCP AD 21 MM W/ DIL BLU LF DISP

## (undated) DEVICE — TOWEL 4 PLY TISS 19X30 SUE WHT

## (undated) DEVICE — INFECTION CONTROL KIT SYS

## (undated) DEVICE — APPLICATOR BNDG 1MM ADH PREMIERPRO EXOFIN

## (undated) DEVICE — (D)AGENT INJECTN ELEVIEW 10ML -- DISC BY MFG

## (undated) DEVICE — SOLIDIFIER MEDC 1200ML -- CONVERT TO 356117

## (undated) DEVICE — STERILE POLYISOPRENE POWDER-FREE SURGICAL GLOVES: Brand: PROTEXIS

## (undated) DEVICE — ROCKER SWITCH PENCIL HOLSTER: Brand: VALLEYLAB

## (undated) DEVICE — SOLUTION IV 1000ML 0.9% SOD CHL

## (undated) DEVICE — RELOAD STPL L75MM OPN H3.8MM CLS 1.5MM WIRE DIA0.2MM REG

## (undated) DEVICE — NEEDLE SCLERO 25GA L240CM OD0.51MM ID0.24MM EXTN L4MM SHTH

## (undated) DEVICE — 3M™ IOBAN™ 2 ANTIMICROBIAL INCISE DRAPE 6650EZ: Brand: IOBAN™ 2

## (undated) DEVICE — STERILE-Z MAYO STAND COVERS CLEAR POLYETHYLENE STERILE UNIVERSAL FIT 20 PER CASE: Brand: STERILE-Z

## (undated) DEVICE — STERILE POLYISOPRENE POWDER-FREE SURGICAL GLOVES WITH EMOLLIENT COATING: Brand: PROTEXIS

## (undated) DEVICE — SUTURE PDS II SZ 0 L60IN ABSRB VLT L48MM CTX 1/2 CIR Z990G

## (undated) DEVICE — FORCEPS BX L240CM JAW DIA2.8MM L CAP W/ NDL MIC MESH TOOTH

## (undated) DEVICE — MEDI-VAC YANK SUCT HNDL W/TPRD BULBOUS TIP: Brand: CARDINAL HEALTH

## (undated) DEVICE — TRAY CATH 16F URIN MTR LTX -- CONVERT TO ITEM 363111

## (undated) DEVICE — BLUNT TROCAR WITH THREADED ANCHOR: Brand: VERSAONE